# Patient Record
Sex: MALE | Race: WHITE | Employment: OTHER | URBAN - METROPOLITAN AREA
[De-identification: names, ages, dates, MRNs, and addresses within clinical notes are randomized per-mention and may not be internally consistent; named-entity substitution may affect disease eponyms.]

---

## 2018-04-08 ENCOUNTER — APPOINTMENT (OUTPATIENT)
Dept: CT IMAGING | Age: 73
DRG: 392 | End: 2018-04-08
Payer: MEDICARE

## 2018-04-08 ENCOUNTER — HOSPITAL ENCOUNTER (INPATIENT)
Age: 73
LOS: 3 days | Discharge: HOME HEALTH | DRG: 392 | End: 2018-04-11
Attending: EMERGENCY MEDICINE | Admitting: INTERNAL MEDICINE
Payer: MEDICARE

## 2018-04-08 DIAGNOSIS — K57.30 DIVERTICULOSIS OF LARGE INTESTINE WITHOUT HEMORRHAGE: ICD-10-CM

## 2018-04-08 DIAGNOSIS — K57.20 COLONIC DIVERTICULAR ABSCESS: Primary | ICD-10-CM

## 2018-04-08 DIAGNOSIS — Z86.73 HISTORY OF STROKE: ICD-10-CM

## 2018-04-08 DIAGNOSIS — Z85.46 HISTORY OF PROSTATE CANCER: ICD-10-CM

## 2018-04-08 DIAGNOSIS — K57.32 DIVERTICULITIS OF COLON: ICD-10-CM

## 2018-04-08 DIAGNOSIS — E78.5 HYPERLIPIDEMIA, UNSPECIFIED HYPERLIPIDEMIA TYPE: ICD-10-CM

## 2018-04-08 LAB
ALBUMIN SERPL-MCNC: 4.1 G/DL (ref 3.5–5.2)
ALP BLD-CCNC: 87 U/L (ref 40–129)
ALT SERPL-CCNC: 58 U/L (ref 0–40)
ANION GAP SERPL CALCULATED.3IONS-SCNC: 12 MMOL/L (ref 7–16)
AST SERPL-CCNC: 31 U/L (ref 0–39)
BASOPHILS ABSOLUTE: 0 E9/L (ref 0–0.2)
BASOPHILS RELATIVE PERCENT: 0 % (ref 0–2)
BILIRUB SERPL-MCNC: 0.8 MG/DL (ref 0–1.2)
BILIRUBIN URINE: NEGATIVE
BLOOD, URINE: NEGATIVE
BUN BLDV-MCNC: 14 MG/DL (ref 8–23)
CALCIUM SERPL-MCNC: 9.8 MG/DL (ref 8.6–10.2)
CHLORIDE BLD-SCNC: 96 MMOL/L (ref 98–107)
CLARITY: CLEAR
CO2: 28 MMOL/L (ref 22–29)
COLOR: YELLOW
CREAT SERPL-MCNC: 1 MG/DL (ref 0.7–1.2)
EKG ATRIAL RATE: 103 BPM
EKG P AXIS: 39 DEGREES
EKG P-R INTERVAL: 128 MS
EKG Q-T INTERVAL: 340 MS
EKG QRS DURATION: 76 MS
EKG QTC CALCULATION (BAZETT): 445 MS
EKG R AXIS: 39 DEGREES
EKG T AXIS: 47 DEGREES
EKG VENTRICULAR RATE: 103 BPM
EOSINOPHILS ABSOLUTE: 0.15 E9/L (ref 0.05–0.5)
EOSINOPHILS RELATIVE PERCENT: 1.7 % (ref 0–6)
GFR AFRICAN AMERICAN: >60
GFR NON-AFRICAN AMERICAN: >60 ML/MIN/1.73
GLUCOSE BLD-MCNC: 116 MG/DL (ref 74–109)
GLUCOSE URINE: NEGATIVE MG/DL
HCT VFR BLD CALC: 48.3 % (ref 37–54)
HEMOGLOBIN: 16.1 G/DL (ref 12.5–16.5)
KETONES, URINE: NEGATIVE MG/DL
LACTIC ACID: 2.6 MMOL/L (ref 0.5–2.2)
LEUKOCYTE ESTERASE, URINE: NEGATIVE
LIPASE: 18 U/L (ref 13–60)
LYMPHOCYTES ABSOLUTE: 0.54 E9/L (ref 1.5–4)
LYMPHOCYTES RELATIVE PERCENT: 6.1 % (ref 20–42)
MCH RBC QN AUTO: 32.7 PG (ref 26–35)
MCHC RBC AUTO-ENTMCNC: 33.3 % (ref 32–34.5)
MCV RBC AUTO: 98.2 FL (ref 80–99.9)
MONOCYTES ABSOLUTE: 0 E9/L (ref 0.1–0.95)
MONOCYTES RELATIVE PERCENT: 0 % (ref 2–12)
NEUTROPHILS ABSOLUTE: 8.28 E9/L (ref 1.8–7.3)
NEUTROPHILS RELATIVE PERCENT: 92.2 % (ref 43–80)
NITRITE, URINE: NEGATIVE
NUCLEATED RED BLOOD CELLS: 0 /100 WBC
PDW BLD-RTO: 12.4 FL (ref 11.5–15)
PH UA: 6 (ref 5–9)
PLATELET # BLD: 297 E9/L (ref 130–450)
PMV BLD AUTO: 9.1 FL (ref 7–12)
POTASSIUM SERPL-SCNC: 4.4 MMOL/L (ref 3.5–5)
PROTEIN UA: NEGATIVE MG/DL
RBC # BLD: 4.92 E12/L (ref 3.8–5.8)
SODIUM BLD-SCNC: 136 MMOL/L (ref 132–146)
SPECIFIC GRAVITY UA: 1.01 (ref 1–1.03)
TOTAL PROTEIN: 7.5 G/DL (ref 6.4–8.3)
UROBILINOGEN, URINE: 0.2 E.U./DL
WBC # BLD: 9 E9/L (ref 4.5–11.5)

## 2018-04-08 PROCEDURE — 83605 ASSAY OF LACTIC ACID: CPT

## 2018-04-08 PROCEDURE — 2580000003 HC RX 258: Performed by: INTERNAL MEDICINE

## 2018-04-08 PROCEDURE — 6360000004 HC RX CONTRAST MEDICATION: Performed by: RADIOLOGY

## 2018-04-08 PROCEDURE — 36415 COLL VENOUS BLD VENIPUNCTURE: CPT

## 2018-04-08 PROCEDURE — 81003 URINALYSIS AUTO W/O SCOPE: CPT

## 2018-04-08 PROCEDURE — 2580000003 HC RX 258: Performed by: STUDENT IN AN ORGANIZED HEALTH CARE EDUCATION/TRAINING PROGRAM

## 2018-04-08 PROCEDURE — 74176 CT ABD & PELVIS W/O CONTRAST: CPT

## 2018-04-08 PROCEDURE — 87040 BLOOD CULTURE FOR BACTERIA: CPT

## 2018-04-08 PROCEDURE — 85025 COMPLETE CBC W/AUTO DIFF WBC: CPT

## 2018-04-08 PROCEDURE — 83690 ASSAY OF LIPASE: CPT

## 2018-04-08 PROCEDURE — 1200000000 HC SEMI PRIVATE

## 2018-04-08 PROCEDURE — 6360000002 HC RX W HCPCS: Performed by: INTERNAL MEDICINE

## 2018-04-08 PROCEDURE — 6370000000 HC RX 637 (ALT 250 FOR IP): Performed by: STUDENT IN AN ORGANIZED HEALTH CARE EDUCATION/TRAINING PROGRAM

## 2018-04-08 PROCEDURE — 80053 COMPREHEN METABOLIC PANEL: CPT

## 2018-04-08 PROCEDURE — 87077 CULTURE AEROBIC IDENTIFY: CPT

## 2018-04-08 PROCEDURE — 99285 EMERGENCY DEPT VISIT HI MDM: CPT

## 2018-04-08 PROCEDURE — 74177 CT ABD & PELVIS W/CONTRAST: CPT

## 2018-04-08 PROCEDURE — 87186 SC STD MICRODIL/AGAR DIL: CPT

## 2018-04-08 PROCEDURE — 93005 ELECTROCARDIOGRAM TRACING: CPT | Performed by: STUDENT IN AN ORGANIZED HEALTH CARE EDUCATION/TRAINING PROGRAM

## 2018-04-08 RX ORDER — SIMVASTATIN 10 MG
20 TABLET ORAL NIGHTLY
COMMUNITY
End: 2019-06-10 | Stop reason: SDUPTHER

## 2018-04-08 RX ORDER — TRAMADOL HYDROCHLORIDE 50 MG/1
50 TABLET ORAL EVERY 6 HOURS PRN
Status: ON HOLD | COMMUNITY
End: 2018-04-08

## 2018-04-08 RX ORDER — CHLORAL HYDRATE 500 MG
1000 CAPSULE ORAL DAILY
COMMUNITY

## 2018-04-08 RX ORDER — METRONIDAZOLE 500 MG/1
500 TABLET ORAL ONCE
Status: COMPLETED | OUTPATIENT
Start: 2018-04-08 | End: 2018-04-08

## 2018-04-08 RX ORDER — MORPHINE SULFATE 4 MG/ML
4 INJECTION, SOLUTION INTRAMUSCULAR; INTRAVENOUS EVERY 4 HOURS PRN
Status: DISCONTINUED | OUTPATIENT
Start: 2018-04-08 | End: 2018-04-08 | Stop reason: SDUPTHER

## 2018-04-08 RX ORDER — ACETAMINOPHEN 325 MG/1
650 TABLET ORAL EVERY 4 HOURS PRN
Status: DISCONTINUED | OUTPATIENT
Start: 2018-04-08 | End: 2018-04-11 | Stop reason: HOSPADM

## 2018-04-08 RX ORDER — ONDANSETRON 2 MG/ML
4 INJECTION INTRAMUSCULAR; INTRAVENOUS EVERY 6 HOURS PRN
Status: DISCONTINUED | OUTPATIENT
Start: 2018-04-08 | End: 2018-04-11 | Stop reason: HOSPADM

## 2018-04-08 RX ORDER — MORPHINE SULFATE 2 MG/ML
2 INJECTION, SOLUTION INTRAMUSCULAR; INTRAVENOUS EVERY 4 HOURS PRN
Status: DISCONTINUED | OUTPATIENT
Start: 2018-04-08 | End: 2018-04-08 | Stop reason: SDUPTHER

## 2018-04-08 RX ORDER — MORPHINE SULFATE 2 MG/ML
4 INJECTION, SOLUTION INTRAMUSCULAR; INTRAVENOUS EVERY 4 HOURS PRN
Status: DISCONTINUED | OUTPATIENT
Start: 2018-04-08 | End: 2018-04-11

## 2018-04-08 RX ORDER — MORPHINE SULFATE 2 MG/ML
2 INJECTION, SOLUTION INTRAMUSCULAR; INTRAVENOUS EVERY 4 HOURS PRN
Status: DISCONTINUED | OUTPATIENT
Start: 2018-04-08 | End: 2018-04-11

## 2018-04-08 RX ORDER — CIPROFLOXACIN 2 MG/ML
400 INJECTION, SOLUTION INTRAVENOUS EVERY 12 HOURS
Status: DISCONTINUED | OUTPATIENT
Start: 2018-04-09 | End: 2018-04-09

## 2018-04-08 RX ORDER — VITAMIN B COMPLEX
1 CAPSULE ORAL DAILY
COMMUNITY

## 2018-04-08 RX ORDER — SODIUM CHLORIDE 9 MG/ML
INJECTION, SOLUTION INTRAVENOUS CONTINUOUS
Status: DISCONTINUED | OUTPATIENT
Start: 2018-04-08 | End: 2018-04-11

## 2018-04-08 RX ORDER — SODIUM CHLORIDE 0.9 % (FLUSH) 0.9 %
10 SYRINGE (ML) INJECTION PRN
Status: DISCONTINUED | OUTPATIENT
Start: 2018-04-08 | End: 2018-04-11 | Stop reason: HOSPADM

## 2018-04-08 RX ORDER — CIPROFLOXACIN 500 MG/1
500 TABLET, FILM COATED ORAL ONCE
Status: COMPLETED | OUTPATIENT
Start: 2018-04-08 | End: 2018-04-08

## 2018-04-08 RX ORDER — 0.9 % SODIUM CHLORIDE 0.9 %
1000 INTRAVENOUS SOLUTION INTRAVENOUS ONCE
Status: COMPLETED | OUTPATIENT
Start: 2018-04-08 | End: 2018-04-08

## 2018-04-08 RX ORDER — SODIUM CHLORIDE 0.9 % (FLUSH) 0.9 %
10 SYRINGE (ML) INJECTION EVERY 12 HOURS SCHEDULED
Status: DISCONTINUED | OUTPATIENT
Start: 2018-04-08 | End: 2018-04-11 | Stop reason: HOSPADM

## 2018-04-08 RX ORDER — ACYCLOVIR 200 MG/1
CAPSULE ORAL
Status: ON HOLD | COMMUNITY
End: 2018-04-08 | Stop reason: ALTCHOICE

## 2018-04-08 RX ORDER — CYCLOBENZAPRINE HCL 10 MG
10 TABLET ORAL 3 TIMES DAILY PRN
Status: ON HOLD | COMMUNITY
End: 2018-04-08

## 2018-04-08 RX ADMIN — Medication 10 ML: at 21:59

## 2018-04-08 RX ADMIN — METRONIDAZOLE 500 MG: 500 TABLET ORAL at 15:31

## 2018-04-08 RX ADMIN — CIPROFLOXACIN HYDROCHLORIDE 500 MG: 500 TABLET, FILM COATED ORAL at 15:32

## 2018-04-08 RX ADMIN — SODIUM CHLORIDE: 9 INJECTION, SOLUTION INTRAVENOUS at 21:58

## 2018-04-08 RX ADMIN — IOPAMIDOL 110 ML: 755 INJECTION, SOLUTION INTRAVENOUS at 17:22

## 2018-04-08 RX ADMIN — IOHEXOL 50 ML: 240 INJECTION, SOLUTION INTRATHECAL; INTRAVASCULAR; INTRAVENOUS; ORAL at 17:21

## 2018-04-08 RX ADMIN — SODIUM CHLORIDE 1000 ML: 9 INJECTION, SOLUTION INTRAVENOUS at 14:36

## 2018-04-08 RX ADMIN — ENOXAPARIN SODIUM 40 MG: 40 INJECTION, SOLUTION INTRAVENOUS; SUBCUTANEOUS at 22:04

## 2018-04-08 ASSESSMENT — ENCOUNTER SYMPTOMS
COUGH: 1
DIARRHEA: 1
PHOTOPHOBIA: 0
COLOR CHANGE: 0
ABDOMINAL PAIN: 1
TROUBLE SWALLOWING: 0
SINUS PRESSURE: 0
SINUS PAIN: 0
BLOOD IN STOOL: 0
EYE PAIN: 0
CHEST TIGHTNESS: 0
SHORTNESS OF BREATH: 0
ABDOMINAL DISTENTION: 1

## 2018-04-08 ASSESSMENT — PAIN SCALES - GENERAL
PAINLEVEL_OUTOF10: 4
PAINLEVEL_OUTOF10: 10

## 2018-04-08 ASSESSMENT — PAIN DESCRIPTION - ORIENTATION: ORIENTATION: LOWER;LEFT

## 2018-04-08 ASSESSMENT — PAIN DESCRIPTION - LOCATION
LOCATION: ABDOMEN
LOCATION: ABDOMEN

## 2018-04-08 ASSESSMENT — PAIN DESCRIPTION - ONSET: ONSET: ON-GOING

## 2018-04-08 ASSESSMENT — PAIN DESCRIPTION - FREQUENCY: FREQUENCY: INTERMITTENT

## 2018-04-08 ASSESSMENT — PAIN DESCRIPTION - PROGRESSION: CLINICAL_PROGRESSION: NOT CHANGED

## 2018-04-08 ASSESSMENT — PAIN DESCRIPTION - PAIN TYPE
TYPE: ACUTE PAIN
TYPE: ACUTE PAIN

## 2018-04-08 ASSESSMENT — PAIN DESCRIPTION - DESCRIPTORS: DESCRIPTORS: ACHING;DISCOMFORT

## 2018-04-09 ENCOUNTER — APPOINTMENT (OUTPATIENT)
Dept: CT IMAGING | Age: 73
DRG: 392 | End: 2018-04-09
Payer: MEDICARE

## 2018-04-09 LAB
ALBUMIN SERPL-MCNC: 3 G/DL (ref 3.5–5.2)
ALP BLD-CCNC: 86 U/L (ref 40–129)
ALT SERPL-CCNC: 92 U/L (ref 0–40)
ANION GAP SERPL CALCULATED.3IONS-SCNC: 9 MMOL/L (ref 7–16)
AST SERPL-CCNC: 48 U/L (ref 0–39)
BASOPHILS ABSOLUTE: 0.05 E9/L (ref 0–0.2)
BASOPHILS RELATIVE PERCENT: 0.3 % (ref 0–2)
BILIRUB SERPL-MCNC: 0.7 MG/DL (ref 0–1.2)
BUN BLDV-MCNC: 15 MG/DL (ref 8–23)
CALCIUM SERPL-MCNC: 8.9 MG/DL (ref 8.6–10.2)
CHLORIDE BLD-SCNC: 102 MMOL/L (ref 98–107)
CO2: 26 MMOL/L (ref 22–29)
CREAT SERPL-MCNC: 1 MG/DL (ref 0.7–1.2)
EOSINOPHILS ABSOLUTE: 0.1 E9/L (ref 0.05–0.5)
EOSINOPHILS RELATIVE PERCENT: 0.5 % (ref 0–6)
GFR AFRICAN AMERICAN: >60
GFR NON-AFRICAN AMERICAN: >60 ML/MIN/1.73
GLUCOSE BLD-MCNC: 108 MG/DL (ref 74–109)
HCT VFR BLD CALC: 40.3 % (ref 37–54)
HEMOGLOBIN: 13.4 G/DL (ref 12.5–16.5)
IMMATURE GRANULOCYTES #: 0.13 E9/L
IMMATURE GRANULOCYTES %: 0.7 % (ref 0–5)
INR BLD: 1.4
LACTIC ACID: 1.3 MMOL/L (ref 0.5–2.2)
LYMPHOCYTES ABSOLUTE: 1.08 E9/L (ref 1.5–4)
LYMPHOCYTES RELATIVE PERCENT: 5.5 % (ref 20–42)
MCH RBC QN AUTO: 32.7 PG (ref 26–35)
MCHC RBC AUTO-ENTMCNC: 33.3 % (ref 32–34.5)
MCV RBC AUTO: 98.3 FL (ref 80–99.9)
MONOCYTES ABSOLUTE: 0.95 E9/L (ref 0.1–0.95)
MONOCYTES RELATIVE PERCENT: 4.8 % (ref 2–12)
NEUTROPHILS ABSOLUTE: 17.28 E9/L (ref 1.8–7.3)
NEUTROPHILS RELATIVE PERCENT: 88.2 % (ref 43–80)
PDW BLD-RTO: 12.7 FL (ref 11.5–15)
PLATELET # BLD: 233 E9/L (ref 130–450)
PMV BLD AUTO: 9.2 FL (ref 7–12)
POTASSIUM REFLEX MAGNESIUM: 4.4 MMOL/L (ref 3.5–5)
PROTHROMBIN TIME: 16 SEC (ref 9.3–12.4)
RBC # BLD: 4.1 E12/L (ref 3.8–5.8)
SODIUM BLD-SCNC: 137 MMOL/L (ref 132–146)
TOTAL PROTEIN: 5.7 G/DL (ref 6.4–8.3)
WBC # BLD: 19.6 E9/L (ref 4.5–11.5)

## 2018-04-09 PROCEDURE — 0W9J3ZZ DRAINAGE OF PELVIC CAVITY, PERCUTANEOUS APPROACH: ICD-10-PCS | Performed by: RADIOLOGY

## 2018-04-09 PROCEDURE — 87075 CULTR BACTERIA EXCEPT BLOOD: CPT

## 2018-04-09 PROCEDURE — 6360000002 HC RX W HCPCS: Performed by: SPECIALIST

## 2018-04-09 PROCEDURE — 87077 CULTURE AEROBIC IDENTIFY: CPT

## 2018-04-09 PROCEDURE — 87205 SMEAR GRAM STAIN: CPT

## 2018-04-09 PROCEDURE — C1769 GUIDE WIRE: HCPCS

## 2018-04-09 PROCEDURE — 87186 SC STD MICRODIL/AGAR DIL: CPT

## 2018-04-09 PROCEDURE — 2500000003 HC RX 250 WO HCPCS: Performed by: SURGERY

## 2018-04-09 PROCEDURE — 2580000003 HC RX 258: Performed by: INTERNAL MEDICINE

## 2018-04-09 PROCEDURE — 6360000002 HC RX W HCPCS: Performed by: RADIOLOGY

## 2018-04-09 PROCEDURE — 1200000000 HC SEMI PRIVATE

## 2018-04-09 PROCEDURE — 87070 CULTURE OTHR SPECIMN AEROBIC: CPT

## 2018-04-09 PROCEDURE — 83605 ASSAY OF LACTIC ACID: CPT

## 2018-04-09 PROCEDURE — 80053 COMPREHEN METABOLIC PANEL: CPT

## 2018-04-09 PROCEDURE — 2580000003 HC RX 258: Performed by: SPECIALIST

## 2018-04-09 PROCEDURE — 6360000002 HC RX W HCPCS: Performed by: INTERNAL MEDICINE

## 2018-04-09 PROCEDURE — 85025 COMPLETE CBC W/AUTO DIFF WBC: CPT

## 2018-04-09 PROCEDURE — 85610 PROTHROMBIN TIME: CPT

## 2018-04-09 PROCEDURE — 36415 COLL VENOUS BLD VENIPUNCTURE: CPT

## 2018-04-09 PROCEDURE — 6370000000 HC RX 637 (ALT 250 FOR IP): Performed by: INTERNAL MEDICINE

## 2018-04-09 RX ORDER — SODIUM CHLORIDE 0.9 % (FLUSH) 0.9 %
10 SYRINGE (ML) INJECTION PRN
Status: DISCONTINUED | OUTPATIENT
Start: 2018-04-09 | End: 2018-04-11 | Stop reason: HOSPADM

## 2018-04-09 RX ORDER — DIPHENHYDRAMINE HYDROCHLORIDE 50 MG/ML
25 INJECTION INTRAMUSCULAR; INTRAVENOUS EVERY 6 HOURS PRN
Status: DISCONTINUED | OUTPATIENT
Start: 2018-04-09 | End: 2018-04-11 | Stop reason: HOSPADM

## 2018-04-09 RX ORDER — FENTANYL CITRATE 50 UG/ML
50 INJECTION, SOLUTION INTRAMUSCULAR; INTRAVENOUS ONCE
Status: COMPLETED | OUTPATIENT
Start: 2018-04-09 | End: 2018-04-09

## 2018-04-09 RX ORDER — MIDAZOLAM HYDROCHLORIDE 1 MG/ML
1 INJECTION INTRAMUSCULAR; INTRAVENOUS ONCE
Status: COMPLETED | OUTPATIENT
Start: 2018-04-09 | End: 2018-04-09

## 2018-04-09 RX ORDER — LIDOCAINE HYDROCHLORIDE 10 MG/ML
5 INJECTION, SOLUTION INFILTRATION; PERINEURAL ONCE
Status: COMPLETED | OUTPATIENT
Start: 2018-04-09 | End: 2018-04-10

## 2018-04-09 RX ORDER — CHLORPHENIRAMINE MALEATE 4 MG/1
4 TABLET ORAL NIGHTLY
Status: ON HOLD | COMMUNITY
End: 2018-04-11 | Stop reason: HOSPADM

## 2018-04-09 RX ORDER — DIPHENHYDRAMINE HCL 25 MG
25 TABLET ORAL DAILY
Status: ON HOLD | COMMUNITY
End: 2022-10-02

## 2018-04-09 RX ORDER — SODIUM CHLORIDE 0.9 % (FLUSH) 0.9 %
10 SYRINGE (ML) INJECTION EVERY 12 HOURS SCHEDULED
Status: DISCONTINUED | OUTPATIENT
Start: 2018-04-09 | End: 2018-04-11 | Stop reason: HOSPADM

## 2018-04-09 RX ADMIN — MIDAZOLAM HYDROCHLORIDE 1 MG: 1 INJECTION, SOLUTION INTRAMUSCULAR; INTRAVENOUS at 12:30

## 2018-04-09 RX ADMIN — CIPROFLOXACIN 400 MG: 2 INJECTION, SOLUTION INTRAVENOUS at 16:34

## 2018-04-09 RX ADMIN — ACETAMINOPHEN 650 MG: 325 TABLET ORAL at 15:27

## 2018-04-09 RX ADMIN — DIPHENHYDRAMINE HYDROCHLORIDE 25 MG: 50 INJECTION, SOLUTION INTRAMUSCULAR; INTRAVENOUS at 17:32

## 2018-04-09 RX ADMIN — CIPROFLOXACIN 400 MG: 2 INJECTION, SOLUTION INTRAVENOUS at 04:26

## 2018-04-09 RX ADMIN — METRONIDAZOLE 500 MG: 500 INJECTION, SOLUTION INTRAVENOUS at 00:05

## 2018-04-09 RX ADMIN — METRONIDAZOLE 500 MG: 500 INJECTION, SOLUTION INTRAVENOUS at 08:05

## 2018-04-09 RX ADMIN — FENTANYL CITRATE 50 MCG: 50 INJECTION INTRAMUSCULAR; INTRAVENOUS at 12:31

## 2018-04-09 RX ADMIN — METRONIDAZOLE 500 MG: 500 INJECTION, SOLUTION INTRAVENOUS at 15:14

## 2018-04-09 RX ADMIN — ACETAMINOPHEN 650 MG: 325 TABLET ORAL at 00:05

## 2018-04-09 RX ADMIN — MEROPENEM 1 G: 1 INJECTION, POWDER, FOR SOLUTION INTRAVENOUS at 20:38

## 2018-04-09 RX ADMIN — SODIUM CHLORIDE: 9 INJECTION, SOLUTION INTRAVENOUS at 13:28

## 2018-04-09 ASSESSMENT — PAIN SCALES - GENERAL
PAINLEVEL_OUTOF10: 3
PAINLEVEL_OUTOF10: 5
PAINLEVEL_OUTOF10: 0

## 2018-04-09 ASSESSMENT — PAIN DESCRIPTION - ONSET: ONSET: ON-GOING

## 2018-04-09 ASSESSMENT — PAIN DESCRIPTION - LOCATION: LOCATION: HEAD

## 2018-04-09 ASSESSMENT — PAIN DESCRIPTION - DESCRIPTORS: DESCRIPTORS: ACHING;HEADACHE

## 2018-04-09 ASSESSMENT — PAIN DESCRIPTION - FREQUENCY: FREQUENCY: INTERMITTENT

## 2018-04-09 ASSESSMENT — PAIN DESCRIPTION - ORIENTATION: ORIENTATION: MID

## 2018-04-09 ASSESSMENT — PAIN DESCRIPTION - PROGRESSION: CLINICAL_PROGRESSION: NOT CHANGED

## 2018-04-09 ASSESSMENT — PAIN DESCRIPTION - PAIN TYPE: TYPE: ACUTE PAIN

## 2018-04-10 PROBLEM — R78.81 BACTEREMIA: Status: ACTIVE | Noted: 2018-04-10

## 2018-04-10 PROBLEM — E44.0 MODERATE PROTEIN-CALORIE MALNUTRITION (HCC): Chronic | Status: ACTIVE | Noted: 2018-04-10

## 2018-04-10 LAB
ALBUMIN SERPL-MCNC: 3.4 G/DL (ref 3.5–5.2)
ALP BLD-CCNC: 104 U/L (ref 40–129)
ALT SERPL-CCNC: 69 U/L (ref 0–40)
ANION GAP SERPL CALCULATED.3IONS-SCNC: 12 MMOL/L (ref 7–16)
AST SERPL-CCNC: 28 U/L (ref 0–39)
BASOPHILS ABSOLUTE: 0.04 E9/L (ref 0–0.2)
BASOPHILS RELATIVE PERCENT: 0.3 % (ref 0–2)
BILIRUB SERPL-MCNC: 0.4 MG/DL (ref 0–1.2)
BUN BLDV-MCNC: 13 MG/DL (ref 8–23)
CALCIUM SERPL-MCNC: 8.8 MG/DL (ref 8.6–10.2)
CHLORIDE BLD-SCNC: 101 MMOL/L (ref 98–107)
CO2: 23 MMOL/L (ref 22–29)
CREAT SERPL-MCNC: 0.8 MG/DL (ref 0.7–1.2)
EOSINOPHILS ABSOLUTE: 0.09 E9/L (ref 0.05–0.5)
EOSINOPHILS RELATIVE PERCENT: 0.7 % (ref 0–6)
GFR AFRICAN AMERICAN: >60
GFR NON-AFRICAN AMERICAN: >60 ML/MIN/1.73
GLUCOSE BLD-MCNC: 109 MG/DL (ref 74–109)
GRAM STAIN ORDERABLE: NORMAL
HCT VFR BLD CALC: 41.6 % (ref 37–54)
HEMOGLOBIN: 14.2 G/DL (ref 12.5–16.5)
IMMATURE GRANULOCYTES #: 0.06 E9/L
IMMATURE GRANULOCYTES %: 0.5 % (ref 0–5)
LYMPHOCYTES ABSOLUTE: 0.73 E9/L (ref 1.5–4)
LYMPHOCYTES RELATIVE PERCENT: 6 % (ref 20–42)
MCH RBC QN AUTO: 33.3 PG (ref 26–35)
MCHC RBC AUTO-ENTMCNC: 34.1 % (ref 32–34.5)
MCV RBC AUTO: 97.4 FL (ref 80–99.9)
MONOCYTES ABSOLUTE: 0.83 E9/L (ref 0.1–0.95)
MONOCYTES RELATIVE PERCENT: 6.8 % (ref 2–12)
NEUTROPHILS ABSOLUTE: 10.39 E9/L (ref 1.8–7.3)
NEUTROPHILS RELATIVE PERCENT: 85.7 % (ref 43–80)
PDW BLD-RTO: 12.6 FL (ref 11.5–15)
PLATELET # BLD: 238 E9/L (ref 130–450)
PMV BLD AUTO: 9.7 FL (ref 7–12)
POTASSIUM REFLEX MAGNESIUM: 4.3 MMOL/L (ref 3.5–5)
RBC # BLD: 4.27 E12/L (ref 3.8–5.8)
SODIUM BLD-SCNC: 136 MMOL/L (ref 132–146)
TOTAL PROTEIN: 6.3 G/DL (ref 6.4–8.3)
WBC # BLD: 12.1 E9/L (ref 4.5–11.5)

## 2018-04-10 PROCEDURE — 1200000000 HC SEMI PRIVATE

## 2018-04-10 PROCEDURE — 36569 INSJ PICC 5 YR+ W/O IMAGING: CPT

## 2018-04-10 PROCEDURE — 85025 COMPLETE CBC W/AUTO DIFF WBC: CPT

## 2018-04-10 PROCEDURE — 2580000003 HC RX 258: Performed by: SPECIALIST

## 2018-04-10 PROCEDURE — 02HV33Z INSERTION OF INFUSION DEVICE INTO SUPERIOR VENA CAVA, PERCUTANEOUS APPROACH: ICD-10-PCS | Performed by: INTERNAL MEDICINE

## 2018-04-10 PROCEDURE — 36415 COLL VENOUS BLD VENIPUNCTURE: CPT

## 2018-04-10 PROCEDURE — 80053 COMPREHEN METABOLIC PANEL: CPT

## 2018-04-10 PROCEDURE — 2500000003 HC RX 250 WO HCPCS: Performed by: SPECIALIST

## 2018-04-10 PROCEDURE — 6360000002 HC RX W HCPCS: Performed by: SPECIALIST

## 2018-04-10 PROCEDURE — 76937 US GUIDE VASCULAR ACCESS: CPT

## 2018-04-10 PROCEDURE — 6370000000 HC RX 637 (ALT 250 FOR IP): Performed by: INTERNAL MEDICINE

## 2018-04-10 PROCEDURE — C1751 CATH, INF, PER/CENT/MIDLINE: HCPCS

## 2018-04-10 PROCEDURE — 6360000002 HC RX W HCPCS: Performed by: INTERNAL MEDICINE

## 2018-04-10 RX ADMIN — ENOXAPARIN SODIUM 40 MG: 40 INJECTION, SOLUTION INTRAVENOUS; SUBCUTANEOUS at 08:04

## 2018-04-10 RX ADMIN — MEROPENEM 1 G: 1 INJECTION, POWDER, FOR SOLUTION INTRAVENOUS at 18:44

## 2018-04-10 RX ADMIN — MEROPENEM 1 G: 1 INJECTION, POWDER, FOR SOLUTION INTRAVENOUS at 03:15

## 2018-04-10 RX ADMIN — Medication 10 ML: at 20:12

## 2018-04-10 RX ADMIN — MEROPENEM 1 G: 1 INJECTION, POWDER, FOR SOLUTION INTRAVENOUS at 10:31

## 2018-04-10 RX ADMIN — LIDOCAINE HYDROCHLORIDE 5 ML: 10 INJECTION, SOLUTION EPIDURAL; INFILTRATION; INTRACAUDAL; PERINEURAL at 12:00

## 2018-04-10 RX ADMIN — ACETAMINOPHEN 650 MG: 325 TABLET ORAL at 22:44

## 2018-04-10 ASSESSMENT — PAIN DESCRIPTION - DESCRIPTORS
DESCRIPTORS: ACHING;DISCOMFORT;SORE
DESCRIPTORS: ACHING;DISCOMFORT;SORE

## 2018-04-10 ASSESSMENT — PAIN DESCRIPTION - ORIENTATION
ORIENTATION: LEFT
ORIENTATION: LEFT

## 2018-04-10 ASSESSMENT — PAIN SCALES - GENERAL
PAINLEVEL_OUTOF10: 6
PAINLEVEL_OUTOF10: 5
PAINLEVEL_OUTOF10: 0
PAINLEVEL_OUTOF10: 0

## 2018-04-10 ASSESSMENT — PAIN DESCRIPTION - PAIN TYPE
TYPE: ACUTE PAIN
TYPE: ACUTE PAIN

## 2018-04-10 ASSESSMENT — PAIN DESCRIPTION - LOCATION
LOCATION: HIP;LEG
LOCATION: HIP;LEG

## 2018-04-11 VITALS
BODY MASS INDEX: 26.67 KG/M2 | WEIGHT: 156.19 LBS | RESPIRATION RATE: 14 BRPM | SYSTOLIC BLOOD PRESSURE: 161 MMHG | TEMPERATURE: 98.1 F | HEIGHT: 64 IN | DIASTOLIC BLOOD PRESSURE: 74 MMHG | HEART RATE: 77 BPM | OXYGEN SATURATION: 96 %

## 2018-04-11 LAB
ALBUMIN SERPL-MCNC: 2.9 G/DL (ref 3.5–5.2)
ALP BLD-CCNC: 72 U/L (ref 40–129)
ALT SERPL-CCNC: 49 U/L (ref 0–40)
ANAEROBIC CULTURE: NORMAL
ANION GAP SERPL CALCULATED.3IONS-SCNC: 9 MMOL/L (ref 7–16)
AST SERPL-CCNC: 28 U/L (ref 0–39)
BASOPHILS ABSOLUTE: 0.01 E9/L (ref 0–0.2)
BASOPHILS RELATIVE PERCENT: 0.1 % (ref 0–2)
BILIRUB SERPL-MCNC: 0.3 MG/DL (ref 0–1.2)
BODY FLUID CULTURE, STERILE: ABNORMAL
BODY FLUID CULTURE, STERILE: ABNORMAL
BUN BLDV-MCNC: 9 MG/DL (ref 8–23)
CALCIUM SERPL-MCNC: 8.1 MG/DL (ref 8.6–10.2)
CHLORIDE BLD-SCNC: 107 MMOL/L (ref 98–107)
CO2: 25 MMOL/L (ref 22–29)
CREAT SERPL-MCNC: 0.7 MG/DL (ref 0.7–1.2)
EOSINOPHILS ABSOLUTE: 0.17 E9/L (ref 0.05–0.5)
EOSINOPHILS RELATIVE PERCENT: 2.4 % (ref 0–6)
GFR AFRICAN AMERICAN: >60
GFR NON-AFRICAN AMERICAN: >60 ML/MIN/1.73
GLUCOSE BLD-MCNC: 95 MG/DL (ref 74–109)
GRAM STAIN RESULT: ABNORMAL
HCT VFR BLD CALC: 37.9 % (ref 37–54)
HEMOGLOBIN: 12.8 G/DL (ref 12.5–16.5)
IMMATURE GRANULOCYTES #: 0.03 E9/L
IMMATURE GRANULOCYTES %: 0.4 % (ref 0–5)
LYMPHOCYTES ABSOLUTE: 0.99 E9/L (ref 1.5–4)
LYMPHOCYTES RELATIVE PERCENT: 13.8 % (ref 20–42)
MCH RBC QN AUTO: 32.7 PG (ref 26–35)
MCHC RBC AUTO-ENTMCNC: 33.8 % (ref 32–34.5)
MCV RBC AUTO: 96.7 FL (ref 80–99.9)
MONOCYTES ABSOLUTE: 0.92 E9/L (ref 0.1–0.95)
MONOCYTES RELATIVE PERCENT: 12.8 % (ref 2–12)
NEUTROPHILS ABSOLUTE: 5.04 E9/L (ref 1.8–7.3)
NEUTROPHILS RELATIVE PERCENT: 70.5 % (ref 43–80)
ORGANISM: ABNORMAL
PDW BLD-RTO: 12.5 FL (ref 11.5–15)
PLATELET # BLD: 212 E9/L (ref 130–450)
PMV BLD AUTO: 9.4 FL (ref 7–12)
POTASSIUM REFLEX MAGNESIUM: 3.7 MMOL/L (ref 3.5–5)
RBC # BLD: 3.92 E12/L (ref 3.8–5.8)
SODIUM BLD-SCNC: 141 MMOL/L (ref 132–146)
TOTAL PROTEIN: 5.3 G/DL (ref 6.4–8.3)
WBC # BLD: 7.2 E9/L (ref 4.5–11.5)

## 2018-04-11 PROCEDURE — 80053 COMPREHEN METABOLIC PANEL: CPT

## 2018-04-11 PROCEDURE — 6360000002 HC RX W HCPCS: Performed by: SPECIALIST

## 2018-04-11 PROCEDURE — 2580000003 HC RX 258: Performed by: INTERNAL MEDICINE

## 2018-04-11 PROCEDURE — 36592 COLLECT BLOOD FROM PICC: CPT

## 2018-04-11 PROCEDURE — 6360000002 HC RX W HCPCS: Performed by: INTERNAL MEDICINE

## 2018-04-11 PROCEDURE — 2580000003 HC RX 258: Performed by: SPECIALIST

## 2018-04-11 PROCEDURE — 36415 COLL VENOUS BLD VENIPUNCTURE: CPT

## 2018-04-11 PROCEDURE — 6370000000 HC RX 637 (ALT 250 FOR IP): Performed by: INTERNAL MEDICINE

## 2018-04-11 PROCEDURE — 85025 COMPLETE CBC W/AUTO DIFF WBC: CPT

## 2018-04-11 RX ORDER — MEROPENEM 1 G/1
1 INJECTION, POWDER, FOR SOLUTION INTRAVENOUS EVERY 8 HOURS
Qty: 63 G | Refills: 0 | Status: SHIPPED | OUTPATIENT
Start: 2018-04-11 | End: 2018-05-02

## 2018-04-11 RX ORDER — HYDROCODONE BITARTRATE AND ACETAMINOPHEN 5; 325 MG/1; MG/1
1 TABLET ORAL EVERY 4 HOURS PRN
Status: DISCONTINUED | OUTPATIENT
Start: 2018-04-11 | End: 2018-04-11 | Stop reason: HOSPADM

## 2018-04-11 RX ORDER — HYDROCODONE BITARTRATE AND ACETAMINOPHEN 5; 325 MG/1; MG/1
1 TABLET ORAL EVERY 4 HOURS PRN
Qty: 18 TABLET | Refills: 0 | Status: SHIPPED | OUTPATIENT
Start: 2018-04-11 | End: 2018-04-14

## 2018-04-11 RX ORDER — HYDROCODONE BITARTRATE AND ACETAMINOPHEN 5; 325 MG/1; MG/1
2 TABLET ORAL EVERY 4 HOURS PRN
Status: DISCONTINUED | OUTPATIENT
Start: 2018-04-11 | End: 2018-04-11 | Stop reason: HOSPADM

## 2018-04-11 RX ADMIN — MEROPENEM 1 G: 1 INJECTION, POWDER, FOR SOLUTION INTRAVENOUS at 17:22

## 2018-04-11 RX ADMIN — SODIUM CHLORIDE: 9 INJECTION, SOLUTION INTRAVENOUS at 00:35

## 2018-04-11 RX ADMIN — MEROPENEM 1 G: 1 INJECTION, POWDER, FOR SOLUTION INTRAVENOUS at 02:48

## 2018-04-11 RX ADMIN — Medication 10 ML: at 08:17

## 2018-04-11 RX ADMIN — HYDROCODONE BITARTRATE AND ACETAMINOPHEN 2 TABLET: 5; 325 TABLET ORAL at 13:51

## 2018-04-11 RX ADMIN — MEROPENEM 1 G: 1 INJECTION, POWDER, FOR SOLUTION INTRAVENOUS at 10:32

## 2018-04-11 RX ADMIN — ENOXAPARIN SODIUM 40 MG: 40 INJECTION, SOLUTION INTRAVENOUS; SUBCUTANEOUS at 08:16

## 2018-04-11 ASSESSMENT — PAIN DESCRIPTION - DESCRIPTORS: DESCRIPTORS: ACHING;DISCOMFORT;SORE

## 2018-04-11 ASSESSMENT — PAIN DESCRIPTION - FREQUENCY: FREQUENCY: CONTINUOUS

## 2018-04-11 ASSESSMENT — PAIN DESCRIPTION - ORIENTATION: ORIENTATION: LEFT

## 2018-04-11 ASSESSMENT — PAIN DESCRIPTION - LOCATION: LOCATION: ABDOMEN

## 2018-04-11 ASSESSMENT — PAIN DESCRIPTION - PROGRESSION: CLINICAL_PROGRESSION: RAPIDLY IMPROVING

## 2018-04-11 ASSESSMENT — PAIN DESCRIPTION - ONSET: ONSET: ON-GOING

## 2018-04-11 ASSESSMENT — PAIN SCALES - GENERAL: PAINLEVEL_OUTOF10: 5

## 2018-04-11 ASSESSMENT — PAIN DESCRIPTION - PAIN TYPE: TYPE: ACUTE PAIN

## 2018-04-12 LAB
BLOOD CULTURE, ROUTINE: ABNORMAL
ORGANISM: ABNORMAL

## 2018-04-13 LAB — CULTURE, BLOOD 2: NORMAL

## 2018-04-19 ENCOUNTER — HOSPITAL ENCOUNTER (OUTPATIENT)
Age: 73
Discharge: HOME | End: 2018-04-21
Payer: MEDICARE

## 2018-04-19 LAB
ALBUMIN SERPL-MCNC: 3.6 G/DL (ref 3.5–5.2)
ALP BLD-CCNC: 62 U/L (ref 40–129)
ALT SERPL-CCNC: 54 U/L (ref 0–40)
ANION GAP SERPL CALCULATED.3IONS-SCNC: 11 MMOL/L (ref 7–16)
AST SERPL-CCNC: 29 U/L (ref 0–39)
BASOPHILS ABSOLUTE: 0.04 E9/L (ref 0–0.2)
BASOPHILS RELATIVE PERCENT: 0.5 % (ref 0–2)
BILIRUB SERPL-MCNC: 0.4 MG/DL (ref 0–1.2)
BUN BLDV-MCNC: 14 MG/DL (ref 8–23)
CALCIUM SERPL-MCNC: 9.2 MG/DL (ref 8.6–10.2)
CHLORIDE BLD-SCNC: 101 MMOL/L (ref 98–107)
CO2: 27 MMOL/L (ref 22–29)
CREAT SERPL-MCNC: 0.8 MG/DL (ref 0.7–1.2)
EOSINOPHILS ABSOLUTE: 0.19 E9/L (ref 0.05–0.5)
EOSINOPHILS RELATIVE PERCENT: 2.4 % (ref 0–6)
GFR AFRICAN AMERICAN: >60
GFR NON-AFRICAN AMERICAN: >60 ML/MIN/1.73
GLUCOSE BLD-MCNC: 154 MG/DL (ref 74–109)
HCT VFR BLD CALC: 43.3 % (ref 37–54)
HEMOGLOBIN: 14 G/DL (ref 12.5–16.5)
IMMATURE GRANULOCYTES #: 0.02 E9/L
IMMATURE GRANULOCYTES %: 0.2 % (ref 0–5)
LYMPHOCYTES ABSOLUTE: 1.35 E9/L (ref 1.5–4)
LYMPHOCYTES RELATIVE PERCENT: 16.7 % (ref 20–42)
MCH RBC QN AUTO: 31.7 PG (ref 26–35)
MCHC RBC AUTO-ENTMCNC: 32.3 % (ref 32–34.5)
MCV RBC AUTO: 98.2 FL (ref 80–99.9)
MONOCYTES ABSOLUTE: 0.61 E9/L (ref 0.1–0.95)
MONOCYTES RELATIVE PERCENT: 7.6 % (ref 2–12)
NEUTROPHILS ABSOLUTE: 5.86 E9/L (ref 1.8–7.3)
NEUTROPHILS RELATIVE PERCENT: 72.6 % (ref 43–80)
PDW BLD-RTO: 12.5 FL (ref 11.5–15)
PLATELET # BLD: 356 E9/L (ref 130–450)
PMV BLD AUTO: 9.6 FL (ref 7–12)
POTASSIUM SERPL-SCNC: 4.5 MMOL/L (ref 3.5–5)
RBC # BLD: 4.41 E12/L (ref 3.8–5.8)
SODIUM BLD-SCNC: 139 MMOL/L (ref 132–146)
TOTAL PROTEIN: 6.3 G/DL (ref 6.4–8.3)
WBC # BLD: 8.1 E9/L (ref 4.5–11.5)

## 2018-04-19 PROCEDURE — 85025 COMPLETE CBC W/AUTO DIFF WBC: CPT

## 2018-04-19 PROCEDURE — 80053 COMPREHEN METABOLIC PANEL: CPT

## 2018-04-19 PROCEDURE — 36415 COLL VENOUS BLD VENIPUNCTURE: CPT

## 2018-04-23 ENCOUNTER — HOSPITAL ENCOUNTER (OUTPATIENT)
Age: 73
Discharge: HOME | End: 2018-04-25
Payer: MEDICARE

## 2018-04-23 LAB
ALBUMIN SERPL-MCNC: 3.8 G/DL (ref 3.5–5.2)
ALP BLD-CCNC: 56 U/L (ref 40–129)
ALT SERPL-CCNC: 54 U/L (ref 0–40)
ANION GAP SERPL CALCULATED.3IONS-SCNC: 13 MMOL/L (ref 7–16)
AST SERPL-CCNC: 37 U/L (ref 0–39)
BASOPHILS ABSOLUTE: 0.05 E9/L (ref 0–0.2)
BASOPHILS RELATIVE PERCENT: 0.6 % (ref 0–2)
BILIRUB SERPL-MCNC: 0.4 MG/DL (ref 0–1.2)
BUN BLDV-MCNC: 14 MG/DL (ref 8–23)
C-REACTIVE PROTEIN: <0.1 MG/DL (ref 0–0.4)
CALCIUM SERPL-MCNC: 9.3 MG/DL (ref 8.6–10.2)
CHLORIDE BLD-SCNC: 101 MMOL/L (ref 98–107)
CO2: 28 MMOL/L (ref 22–29)
CREAT SERPL-MCNC: 0.7 MG/DL (ref 0.7–1.2)
EOSINOPHILS ABSOLUTE: 0.19 E9/L (ref 0.05–0.5)
EOSINOPHILS RELATIVE PERCENT: 2.3 % (ref 0–6)
GFR AFRICAN AMERICAN: >60
GFR NON-AFRICAN AMERICAN: >60 ML/MIN/1.73
GLUCOSE BLD-MCNC: 105 MG/DL (ref 74–109)
HCT VFR BLD CALC: 41.4 % (ref 37–54)
HEMOGLOBIN: 13.4 G/DL (ref 12.5–16.5)
IMMATURE GRANULOCYTES #: 0.02 E9/L
IMMATURE GRANULOCYTES %: 0.2 % (ref 0–5)
LYMPHOCYTES ABSOLUTE: 1.52 E9/L (ref 1.5–4)
LYMPHOCYTES RELATIVE PERCENT: 18.5 % (ref 20–42)
MCH RBC QN AUTO: 31.8 PG (ref 26–35)
MCHC RBC AUTO-ENTMCNC: 32.4 % (ref 32–34.5)
MCV RBC AUTO: 98.1 FL (ref 80–99.9)
MONOCYTES ABSOLUTE: 0.72 E9/L (ref 0.1–0.95)
MONOCYTES RELATIVE PERCENT: 8.8 % (ref 2–12)
NEUTROPHILS ABSOLUTE: 5.72 E9/L (ref 1.8–7.3)
NEUTROPHILS RELATIVE PERCENT: 69.6 % (ref 43–80)
PDW BLD-RTO: 12.1 FL (ref 11.5–15)
PLATELET # BLD: 322 E9/L (ref 130–450)
PMV BLD AUTO: 9.7 FL (ref 7–12)
POTASSIUM SERPL-SCNC: 4.1 MMOL/L (ref 3.5–5)
RBC # BLD: 4.22 E12/L (ref 3.8–5.8)
SEDIMENTATION RATE, ERYTHROCYTE: 24 MM/HR (ref 0–15)
SODIUM BLD-SCNC: 142 MMOL/L (ref 132–146)
TOTAL PROTEIN: 6.3 G/DL (ref 6.4–8.3)
WBC # BLD: 8.2 E9/L (ref 4.5–11.5)

## 2018-04-23 PROCEDURE — 80053 COMPREHEN METABOLIC PANEL: CPT

## 2018-04-23 PROCEDURE — 85651 RBC SED RATE NONAUTOMATED: CPT

## 2018-04-23 PROCEDURE — 85025 COMPLETE CBC W/AUTO DIFF WBC: CPT

## 2018-04-23 PROCEDURE — 86140 C-REACTIVE PROTEIN: CPT

## 2018-04-26 ENCOUNTER — HOSPITAL ENCOUNTER (OUTPATIENT)
Dept: GENERAL RADIOLOGY | Age: 73
Discharge: HOME | End: 2018-04-28
Payer: MEDICARE

## 2018-04-26 ENCOUNTER — HOSPITAL ENCOUNTER (OUTPATIENT)
Dept: CT IMAGING | Age: 73
Discharge: HOME | End: 2018-04-28
Payer: MEDICARE

## 2018-04-26 DIAGNOSIS — L02.91 ABSCESS: ICD-10-CM

## 2018-04-26 DIAGNOSIS — K57.20 COLONIC DIVERTICULAR ABSCESS: ICD-10-CM

## 2018-04-26 PROCEDURE — 76080 X-RAY EXAM OF FISTULA: CPT

## 2018-11-26 ENCOUNTER — PREP FOR PROCEDURE (OUTPATIENT)
Dept: PODIATRY | Age: 73
End: 2018-11-26

## 2018-11-26 DIAGNOSIS — S90.512D ABRASION, LEFT ANKLE, SUBSEQUENT ENCOUNTER: ICD-10-CM

## 2018-11-26 DIAGNOSIS — M20.22 HALLUX RIGIDUS OF LEFT FOOT: Primary | ICD-10-CM

## 2018-11-26 RX ORDER — SODIUM CHLORIDE 0.9 % (FLUSH) 0.9 %
10 SYRINGE (ML) INJECTION EVERY 12 HOURS SCHEDULED
Status: CANCELLED | OUTPATIENT
Start: 2018-11-26

## 2018-11-26 RX ORDER — SODIUM CHLORIDE 0.9 % (FLUSH) 0.9 %
10 SYRINGE (ML) INJECTION PRN
Status: CANCELLED | OUTPATIENT
Start: 2018-11-26

## 2019-01-11 ENCOUNTER — HOSPITAL ENCOUNTER (OUTPATIENT)
Age: 74
Setting detail: OUTPATIENT SURGERY
Discharge: HOME | End: 2019-01-11
Attending: PODIATRIST | Admitting: PODIATRIST
Payer: MEDICARE

## 2019-01-11 ENCOUNTER — HOSPITAL ENCOUNTER (OUTPATIENT)
Dept: GENERAL RADIOLOGY | Age: 74
Discharge: HOME | End: 2019-01-13
Attending: PODIATRIST
Payer: MEDICARE

## 2019-01-11 ENCOUNTER — ANESTHESIA (OUTPATIENT)
Dept: OPERATING ROOM | Age: 74
End: 2019-01-11
Payer: MEDICARE

## 2019-01-11 ENCOUNTER — ANESTHESIA EVENT (OUTPATIENT)
Dept: OPERATING ROOM | Age: 74
End: 2019-01-11
Payer: MEDICARE

## 2019-01-11 VITALS
SYSTOLIC BLOOD PRESSURE: 152 MMHG | OXYGEN SATURATION: 100 % | TEMPERATURE: 96.3 F | DIASTOLIC BLOOD PRESSURE: 84 MMHG | RESPIRATION RATE: 4 BRPM

## 2019-01-11 VITALS
RESPIRATION RATE: 18 BRPM | SYSTOLIC BLOOD PRESSURE: 148 MMHG | OXYGEN SATURATION: 95 % | BODY MASS INDEX: 26.98 KG/M2 | HEART RATE: 70 BPM | DIASTOLIC BLOOD PRESSURE: 79 MMHG | WEIGHT: 158 LBS | TEMPERATURE: 97.5 F | HEIGHT: 64 IN

## 2019-01-11 DIAGNOSIS — Z98.1 ARTHRODESIS PRESENT: ICD-10-CM

## 2019-01-11 DIAGNOSIS — G89.18 POST-OP PAIN: Primary | ICD-10-CM

## 2019-01-11 DIAGNOSIS — M20.22 HALLUX RIGIDUS OF LEFT FOOT: ICD-10-CM

## 2019-01-11 PROCEDURE — 7100000010 HC PHASE II RECOVERY - FIRST 15 MIN: Performed by: PODIATRIST

## 2019-01-11 PROCEDURE — 88304 TISSUE EXAM BY PATHOLOGIST: CPT

## 2019-01-11 PROCEDURE — C1713 ANCHOR/SCREW BN/BN,TIS/BN: HCPCS | Performed by: PODIATRIST

## 2019-01-11 PROCEDURE — 2500000003 HC RX 250 WO HCPCS: Performed by: PODIATRIST

## 2019-01-11 PROCEDURE — 6360000002 HC RX W HCPCS: Performed by: NURSE ANESTHETIST, CERTIFIED REGISTERED

## 2019-01-11 PROCEDURE — 7100000001 HC PACU RECOVERY - ADDTL 15 MIN: Performed by: PODIATRIST

## 2019-01-11 PROCEDURE — 3700000001 HC ADD 15 MINUTES (ANESTHESIA): Performed by: PODIATRIST

## 2019-01-11 PROCEDURE — 88311 DECALCIFY TISSUE: CPT

## 2019-01-11 PROCEDURE — 2580000003 HC RX 258: Performed by: NURSE ANESTHETIST, CERTIFIED REGISTERED

## 2019-01-11 PROCEDURE — 3600000012 HC SURGERY LEVEL 2 ADDTL 15MIN: Performed by: PODIATRIST

## 2019-01-11 PROCEDURE — 3209999900 FLUORO FOR SURGICAL PROCEDURES

## 2019-01-11 PROCEDURE — 7100000011 HC PHASE II RECOVERY - ADDTL 15 MIN: Performed by: PODIATRIST

## 2019-01-11 PROCEDURE — 2709999900 HC NON-CHARGEABLE SUPPLY: Performed by: PODIATRIST

## 2019-01-11 PROCEDURE — 6360000002 HC RX W HCPCS: Performed by: PODIATRIST

## 2019-01-11 PROCEDURE — 7100000000 HC PACU RECOVERY - FIRST 15 MIN: Performed by: PODIATRIST

## 2019-01-11 PROCEDURE — 3600000002 HC SURGERY LEVEL 2 BASE: Performed by: PODIATRIST

## 2019-01-11 PROCEDURE — 3700000000 HC ANESTHESIA ATTENDED CARE: Performed by: PODIATRIST

## 2019-01-11 PROCEDURE — 2500000003 HC RX 250 WO HCPCS: Performed by: NURSE ANESTHETIST, CERTIFIED REGISTERED

## 2019-01-11 DEVICE — IMPLANTABLE DEVICE: Type: IMPLANTABLE DEVICE | Site: FOOT | Status: FUNCTIONAL

## 2019-01-11 DEVICE — SCREW BNE L16MM DIA3MM CORT FT ANK TI SELF DRL SLD FULL: Type: IMPLANTABLE DEVICE | Site: FOOT | Status: FUNCTIONAL

## 2019-01-11 DEVICE — SCREW BNE L16MM DIA3MM FT ANK TI VAR ANG LOK LO PROF FOR: Type: IMPLANTABLE DEVICE | Site: FOOT | Status: FUNCTIONAL

## 2019-01-11 DEVICE — SCREW BNE L14MM DIA3MM FT ANK TI VAR ANG LCK LO PROF: Type: IMPLANTABLE DEVICE | Site: FOOT | Status: FUNCTIONAL

## 2019-01-11 RX ORDER — GLYCOPYRROLATE 1 MG/5 ML
SYRINGE (ML) INTRAVENOUS PRN
Status: DISCONTINUED | OUTPATIENT
Start: 2019-01-11 | End: 2019-01-11 | Stop reason: SDUPTHER

## 2019-01-11 RX ORDER — SODIUM CHLORIDE 9 MG/ML
INJECTION, SOLUTION INTRAVENOUS CONTINUOUS PRN
Status: DISCONTINUED | OUTPATIENT
Start: 2019-01-11 | End: 2019-01-11

## 2019-01-11 RX ORDER — FENTANYL CITRATE 50 UG/ML
50 INJECTION, SOLUTION INTRAMUSCULAR; INTRAVENOUS EVERY 5 MIN PRN
Status: DISCONTINUED | OUTPATIENT
Start: 2019-01-11 | End: 2019-01-11 | Stop reason: HOSPADM

## 2019-01-11 RX ORDER — SODIUM CHLORIDE 0.9 % (FLUSH) 0.9 %
10 SYRINGE (ML) INJECTION PRN
Status: DISCONTINUED | OUTPATIENT
Start: 2019-01-11 | End: 2019-01-11 | Stop reason: HOSPADM

## 2019-01-11 RX ORDER — OXYCODONE AND ACETAMINOPHEN 7.5; 325 MG/1; MG/1
1 TABLET ORAL EVERY 6 HOURS PRN
Qty: 40 TABLET | Refills: 0 | Status: SHIPPED | OUTPATIENT
Start: 2019-01-11 | End: 2019-01-21

## 2019-01-11 RX ORDER — PROMETHAZINE HYDROCHLORIDE 25 MG/ML
6.25 INJECTION, SOLUTION INTRAMUSCULAR; INTRAVENOUS
Status: DISCONTINUED | OUTPATIENT
Start: 2019-01-11 | End: 2019-01-11 | Stop reason: HOSPADM

## 2019-01-11 RX ORDER — DIPHENHYDRAMINE HYDROCHLORIDE 50 MG/ML
12.5 INJECTION INTRAMUSCULAR; INTRAVENOUS
Status: DISCONTINUED | OUTPATIENT
Start: 2019-01-11 | End: 2019-01-11 | Stop reason: HOSPADM

## 2019-01-11 RX ORDER — CEFAZOLIN SODIUM 2 G/50ML
2 SOLUTION INTRAVENOUS
Status: COMPLETED | OUTPATIENT
Start: 2019-01-11 | End: 2019-01-11

## 2019-01-11 RX ORDER — SODIUM CHLORIDE 0.9 % (FLUSH) 0.9 %
10 SYRINGE (ML) INJECTION EVERY 12 HOURS SCHEDULED
Status: DISCONTINUED | OUTPATIENT
Start: 2019-01-11 | End: 2019-01-11 | Stop reason: HOSPADM

## 2019-01-11 RX ORDER — MIDAZOLAM HYDROCHLORIDE 1 MG/ML
INJECTION INTRAMUSCULAR; INTRAVENOUS PRN
Status: DISCONTINUED | OUTPATIENT
Start: 2019-01-11 | End: 2019-01-11 | Stop reason: SDUPTHER

## 2019-01-11 RX ORDER — DEXAMETHASONE SODIUM PHOSPHATE 4 MG/ML
INJECTION, SOLUTION INTRA-ARTICULAR; INTRALESIONAL; INTRAMUSCULAR; INTRAVENOUS; SOFT TISSUE PRN
Status: DISCONTINUED | OUTPATIENT
Start: 2019-01-11 | End: 2019-01-11 | Stop reason: SDUPTHER

## 2019-01-11 RX ORDER — SODIUM CHLORIDE, SODIUM LACTATE, POTASSIUM CHLORIDE, CALCIUM CHLORIDE 600; 310; 30; 20 MG/100ML; MG/100ML; MG/100ML; MG/100ML
INJECTION, SOLUTION INTRAVENOUS CONTINUOUS PRN
Status: DISCONTINUED | OUTPATIENT
Start: 2019-01-11 | End: 2019-01-11 | Stop reason: SDUPTHER

## 2019-01-11 RX ORDER — OXYCODONE HYDROCHLORIDE AND ACETAMINOPHEN 5; 325 MG/1; MG/1
1 TABLET ORAL
Status: DISCONTINUED | OUTPATIENT
Start: 2019-01-11 | End: 2019-01-11 | Stop reason: HOSPADM

## 2019-01-11 RX ORDER — MEPERIDINE HYDROCHLORIDE 25 MG/ML
12.5 INJECTION INTRAMUSCULAR; INTRAVENOUS; SUBCUTANEOUS EVERY 5 MIN PRN
Status: DISCONTINUED | OUTPATIENT
Start: 2019-01-11 | End: 2019-01-11 | Stop reason: HOSPADM

## 2019-01-11 RX ORDER — FENTANYL CITRATE 50 UG/ML
INJECTION, SOLUTION INTRAMUSCULAR; INTRAVENOUS PRN
Status: DISCONTINUED | OUTPATIENT
Start: 2019-01-11 | End: 2019-01-11 | Stop reason: SDUPTHER

## 2019-01-11 RX ORDER — PROPOFOL 10 MG/ML
INJECTION, EMULSION INTRAVENOUS PRN
Status: DISCONTINUED | OUTPATIENT
Start: 2019-01-11 | End: 2019-01-11 | Stop reason: SDUPTHER

## 2019-01-11 RX ORDER — ONDANSETRON 2 MG/ML
INJECTION INTRAMUSCULAR; INTRAVENOUS PRN
Status: DISCONTINUED | OUTPATIENT
Start: 2019-01-11 | End: 2019-01-11 | Stop reason: SDUPTHER

## 2019-01-11 RX ORDER — FENTANYL CITRATE 50 UG/ML
25 INJECTION, SOLUTION INTRAMUSCULAR; INTRAVENOUS EVERY 5 MIN PRN
Status: DISCONTINUED | OUTPATIENT
Start: 2019-01-11 | End: 2019-01-11 | Stop reason: HOSPADM

## 2019-01-11 RX ORDER — BUPIVACAINE HYDROCHLORIDE 5 MG/ML
INJECTION, SOLUTION EPIDURAL; INTRACAUDAL PRN
Status: DISCONTINUED | OUTPATIENT
Start: 2019-01-11 | End: 2019-01-11 | Stop reason: HOSPADM

## 2019-01-11 RX ADMIN — FENTANYL CITRATE 20 MCG: 50 INJECTION, SOLUTION INTRAMUSCULAR; INTRAVENOUS at 12:33

## 2019-01-11 RX ADMIN — SODIUM CHLORIDE, POTASSIUM CHLORIDE, SODIUM LACTATE AND CALCIUM CHLORIDE: 600; 310; 30; 20 INJECTION, SOLUTION INTRAVENOUS at 12:32

## 2019-01-11 RX ADMIN — PHENYLEPHRINE HYDROCHLORIDE 100 MCG: 10 INJECTION INTRAVENOUS at 11:55

## 2019-01-11 RX ADMIN — PHENYLEPHRINE HYDROCHLORIDE 100 MCG: 10 INJECTION INTRAVENOUS at 12:21

## 2019-01-11 RX ADMIN — PHENYLEPHRINE HYDROCHLORIDE 100 MCG: 10 INJECTION INTRAVENOUS at 11:38

## 2019-01-11 RX ADMIN — LIDOCAINE HYDROCHLORIDE 100 MG: 20 INJECTION, SOLUTION INTRAVENOUS at 11:31

## 2019-01-11 RX ADMIN — PROPOFOL 185 MG: 10 INJECTION, EMULSION INTRAVENOUS at 11:31

## 2019-01-11 RX ADMIN — PHENYLEPHRINE HYDROCHLORIDE 100 MCG: 10 INJECTION INTRAVENOUS at 12:15

## 2019-01-11 RX ADMIN — DEXAMETHASONE SODIUM PHOSPHATE 10 MG: 4 INJECTION, SOLUTION INTRAMUSCULAR; INTRAVENOUS at 11:35

## 2019-01-11 RX ADMIN — ONDANSETRON 4 MG: 2 INJECTION INTRAMUSCULAR; INTRAVENOUS at 11:35

## 2019-01-11 RX ADMIN — MIDAZOLAM 2 MG: 1 INJECTION INTRAMUSCULAR; INTRAVENOUS at 11:24

## 2019-01-11 RX ADMIN — SODIUM CHLORIDE, POTASSIUM CHLORIDE, SODIUM LACTATE AND CALCIUM CHLORIDE: 600; 310; 30; 20 INJECTION, SOLUTION INTRAVENOUS at 11:03

## 2019-01-11 RX ADMIN — CEFAZOLIN SODIUM 2 G: 2 SOLUTION INTRAVENOUS at 11:22

## 2019-01-11 RX ADMIN — FENTANYL CITRATE 50 MCG: 50 INJECTION, SOLUTION INTRAMUSCULAR; INTRAVENOUS at 11:30

## 2019-01-11 RX ADMIN — PHENYLEPHRINE HYDROCHLORIDE 100 MCG: 10 INJECTION INTRAVENOUS at 11:50

## 2019-01-11 RX ADMIN — Medication 0.2 MG: at 12:15

## 2019-01-11 RX ADMIN — FENTANYL CITRATE 50 MCG: 50 INJECTION, SOLUTION INTRAMUSCULAR; INTRAVENOUS at 11:32

## 2019-01-11 ASSESSMENT — PULMONARY FUNCTION TESTS
PIF_VALUE: 20
PIF_VALUE: 17
PIF_VALUE: 0
PIF_VALUE: 18
PIF_VALUE: 0
PIF_VALUE: 14
PIF_VALUE: 2
PIF_VALUE: 14
PIF_VALUE: 17
PIF_VALUE: 17
PIF_VALUE: 12
PIF_VALUE: 0
PIF_VALUE: 14
PIF_VALUE: 17
PIF_VALUE: 20
PIF_VALUE: 17
PIF_VALUE: 20
PIF_VALUE: 20
PIF_VALUE: 17
PIF_VALUE: 16
PIF_VALUE: 0
PIF_VALUE: 17
PIF_VALUE: 5
PIF_VALUE: 15
PIF_VALUE: 17
PIF_VALUE: 17
PIF_VALUE: 5
PIF_VALUE: 14
PIF_VALUE: 17
PIF_VALUE: 17
PIF_VALUE: 16
PIF_VALUE: 16
PIF_VALUE: 17
PIF_VALUE: 18
PIF_VALUE: 16
PIF_VALUE: 16
PIF_VALUE: 0
PIF_VALUE: 12
PIF_VALUE: 0
PIF_VALUE: 1
PIF_VALUE: 16
PIF_VALUE: 20
PIF_VALUE: 20
PIF_VALUE: 17
PIF_VALUE: 20
PIF_VALUE: 17
PIF_VALUE: 14
PIF_VALUE: 17
PIF_VALUE: 12
PIF_VALUE: 17
PIF_VALUE: 20
PIF_VALUE: 17
PIF_VALUE: 21
PIF_VALUE: 19
PIF_VALUE: 16
PIF_VALUE: 14
PIF_VALUE: 17
PIF_VALUE: 20
PIF_VALUE: 0
PIF_VALUE: 9
PIF_VALUE: 26
PIF_VALUE: 17
PIF_VALUE: 26
PIF_VALUE: 17
PIF_VALUE: 17
PIF_VALUE: 20
PIF_VALUE: 14
PIF_VALUE: 14
PIF_VALUE: 16
PIF_VALUE: 17
PIF_VALUE: 17
PIF_VALUE: 1
PIF_VALUE: 16
PIF_VALUE: 17
PIF_VALUE: 0
PIF_VALUE: 14
PIF_VALUE: 18
PIF_VALUE: 17
PIF_VALUE: 17
PIF_VALUE: 1
PIF_VALUE: 1
PIF_VALUE: 16
PIF_VALUE: 17
PIF_VALUE: 16
PIF_VALUE: 20
PIF_VALUE: 17
PIF_VALUE: 16
PIF_VALUE: 4
PIF_VALUE: 14
PIF_VALUE: 20
PIF_VALUE: 17
PIF_VALUE: 14
PIF_VALUE: 17
PIF_VALUE: 20
PIF_VALUE: 17
PIF_VALUE: 17
PIF_VALUE: 14
PIF_VALUE: 0
PIF_VALUE: 1
PIF_VALUE: 14
PIF_VALUE: 17
PIF_VALUE: 18
PIF_VALUE: 1
PIF_VALUE: 2
PIF_VALUE: 17
PIF_VALUE: 17

## 2019-01-11 ASSESSMENT — PAIN SCALES - GENERAL
PAINLEVEL_OUTOF10: 0

## 2019-01-11 ASSESSMENT — PAIN - FUNCTIONAL ASSESSMENT: PAIN_FUNCTIONAL_ASSESSMENT: 0-10

## 2019-06-10 ENCOUNTER — HOSPITAL ENCOUNTER (OUTPATIENT)
Age: 74
Discharge: HOME | End: 2019-06-12
Payer: MEDICARE

## 2019-06-10 ENCOUNTER — OFFICE VISIT (OUTPATIENT)
Dept: FAMILY MEDICINE CLINIC | Age: 74
End: 2019-06-10
Payer: MEDICARE

## 2019-06-10 VITALS
SYSTOLIC BLOOD PRESSURE: 122 MMHG | HEIGHT: 64 IN | DIASTOLIC BLOOD PRESSURE: 64 MMHG | HEART RATE: 70 BPM | TEMPERATURE: 97.9 F | BODY MASS INDEX: 27.66 KG/M2 | WEIGHT: 162 LBS | OXYGEN SATURATION: 97 %

## 2019-06-10 DIAGNOSIS — I35.0 NONRHEUMATIC AORTIC VALVE STENOSIS: ICD-10-CM

## 2019-06-10 DIAGNOSIS — R25.2 MUSCLE CRAMPS: ICD-10-CM

## 2019-06-10 DIAGNOSIS — Z12.11 ENCOUNTER FOR SCREENING COLONOSCOPY: ICD-10-CM

## 2019-06-10 DIAGNOSIS — R25.2 MUSCLE CRAMPS: Primary | ICD-10-CM

## 2019-06-10 DIAGNOSIS — E78.5 HYPERLIPIDEMIA, UNSPECIFIED HYPERLIPIDEMIA TYPE: ICD-10-CM

## 2019-06-10 DIAGNOSIS — Z85.46 HISTORY OF PROSTATE CANCER: ICD-10-CM

## 2019-06-10 LAB
ALBUMIN SERPL-MCNC: 4.9 G/DL (ref 3.5–5.2)
ALP BLD-CCNC: 62 U/L (ref 40–129)
ALT SERPL-CCNC: 30 U/L (ref 0–40)
ANION GAP SERPL CALCULATED.3IONS-SCNC: 13 MMOL/L (ref 7–16)
AST SERPL-CCNC: 27 U/L (ref 0–39)
BASOPHILS ABSOLUTE: 0.04 E9/L (ref 0–0.2)
BASOPHILS RELATIVE PERCENT: 0.6 % (ref 0–2)
BILIRUB SERPL-MCNC: 0.8 MG/DL (ref 0–1.2)
BILIRUBIN DIRECT: <0.2 MG/DL (ref 0–0.3)
BILIRUBIN, INDIRECT: NORMAL MG/DL (ref 0–1)
BUN BLDV-MCNC: 18 MG/DL (ref 8–23)
CALCIUM SERPL-MCNC: 9.9 MG/DL (ref 8.6–10.2)
CHLORIDE BLD-SCNC: 101 MMOL/L (ref 98–107)
CHOLESTEROL, TOTAL: 171 MG/DL (ref 0–199)
CO2: 26 MMOL/L (ref 22–29)
CREAT SERPL-MCNC: 1.1 MG/DL (ref 0.7–1.2)
EOSINOPHILS ABSOLUTE: 0.19 E9/L (ref 0.05–0.5)
EOSINOPHILS RELATIVE PERCENT: 2.7 % (ref 0–6)
FOLATE: >20 NG/ML (ref 4.8–24.2)
GFR AFRICAN AMERICAN: >60
GFR NON-AFRICAN AMERICAN: >60 ML/MIN/1.73
GLUCOSE BLD-MCNC: 94 MG/DL (ref 74–99)
HCT VFR BLD CALC: 50.4 % (ref 37–54)
HDLC SERPL-MCNC: 48 MG/DL
HEMOGLOBIN: 16.2 G/DL (ref 12.5–16.5)
IMMATURE GRANULOCYTES #: 0.03 E9/L
IMMATURE GRANULOCYTES %: 0.4 % (ref 0–5)
LDL CHOLESTEROL CALCULATED: 99 MG/DL (ref 0–99)
LYMPHOCYTES ABSOLUTE: 1.37 E9/L (ref 1.5–4)
LYMPHOCYTES RELATIVE PERCENT: 19.4 % (ref 20–42)
MAGNESIUM: 2.5 MG/DL (ref 1.6–2.6)
MCH RBC QN AUTO: 32 PG (ref 26–35)
MCHC RBC AUTO-ENTMCNC: 32.1 % (ref 32–34.5)
MCV RBC AUTO: 99.4 FL (ref 80–99.9)
MONOCYTES ABSOLUTE: 0.7 E9/L (ref 0.1–0.95)
MONOCYTES RELATIVE PERCENT: 9.9 % (ref 2–12)
NEUTROPHILS ABSOLUTE: 4.74 E9/L (ref 1.8–7.3)
NEUTROPHILS RELATIVE PERCENT: 67 % (ref 43–80)
PDW BLD-RTO: 12.4 FL (ref 11.5–15)
PHOSPHORUS: 2.8 MG/DL (ref 2.5–4.5)
PLATELET # BLD: 253 E9/L (ref 130–450)
PMV BLD AUTO: 9.8 FL (ref 7–12)
POTASSIUM SERPL-SCNC: 4.8 MMOL/L (ref 3.5–5)
RBC # BLD: 5.07 E12/L (ref 3.8–5.8)
SODIUM BLD-SCNC: 140 MMOL/L (ref 132–146)
TOTAL PROTEIN: 7.8 G/DL (ref 6.4–8.3)
TRIGL SERPL-MCNC: 121 MG/DL (ref 0–149)
TSH SERPL DL<=0.05 MIU/L-ACNC: 1.41 UIU/ML (ref 0.27–4.2)
VITAMIN B-12: 673 PG/ML (ref 211–946)
VITAMIN D 25-HYDROXY: 27 NG/ML (ref 30–100)
VLDLC SERPL CALC-MCNC: 24 MG/DL
WBC # BLD: 7.1 E9/L (ref 4.5–11.5)

## 2019-06-10 PROCEDURE — 82306 VITAMIN D 25 HYDROXY: CPT

## 2019-06-10 PROCEDURE — 82746 ASSAY OF FOLIC ACID SERUM: CPT

## 2019-06-10 PROCEDURE — 84443 ASSAY THYROID STIM HORMONE: CPT

## 2019-06-10 PROCEDURE — 83735 ASSAY OF MAGNESIUM: CPT

## 2019-06-10 PROCEDURE — 99214 OFFICE O/P EST MOD 30 MIN: CPT | Performed by: INTERNAL MEDICINE

## 2019-06-10 PROCEDURE — 82607 VITAMIN B-12: CPT

## 2019-06-10 PROCEDURE — 82248 BILIRUBIN DIRECT: CPT

## 2019-06-10 PROCEDURE — 82247 BILIRUBIN TOTAL: CPT

## 2019-06-10 PROCEDURE — 80069 RENAL FUNCTION PANEL: CPT

## 2019-06-10 PROCEDURE — 84075 ASSAY ALKALINE PHOSPHATASE: CPT

## 2019-06-10 PROCEDURE — 85025 COMPLETE CBC W/AUTO DIFF WBC: CPT

## 2019-06-10 PROCEDURE — 84155 ASSAY OF PROTEIN SERUM: CPT

## 2019-06-10 PROCEDURE — 84460 ALANINE AMINO (ALT) (SGPT): CPT

## 2019-06-10 PROCEDURE — 84450 TRANSFERASE (AST) (SGOT): CPT

## 2019-06-10 PROCEDURE — 80061 LIPID PANEL: CPT

## 2019-06-10 PROCEDURE — 36415 COLL VENOUS BLD VENIPUNCTURE: CPT

## 2019-06-10 RX ORDER — SIMVASTATIN 10 MG
20 TABLET ORAL NIGHTLY
Qty: 90 TABLET | Refills: 1 | Status: SHIPPED | OUTPATIENT
Start: 2019-06-10 | End: 2019-10-15 | Stop reason: SDUPTHER

## 2019-06-10 RX ORDER — CLINDAMYCIN HYDROCHLORIDE 300 MG/1
CAPSULE ORAL
Qty: 6 CAPSULE | Refills: 0 | Status: SHIPPED
Start: 2019-06-10 | End: 2020-03-05

## 2019-06-10 RX ORDER — TETRACYCLINE HCL 500 MG
CAPSULE ORAL DAILY
COMMUNITY

## 2019-06-10 RX ORDER — MAGNESIUM OXIDE 400 MG/1
400 TABLET ORAL DAILY
COMMUNITY

## 2019-06-10 RX ORDER — CLINDAMYCIN HYDROCHLORIDE 300 MG/1
CAPSULE ORAL
Qty: 6 CAPSULE | Refills: 0 | Status: SHIPPED | OUTPATIENT
Start: 2019-06-10 | End: 2019-06-10 | Stop reason: SDUPTHER

## 2019-06-10 ASSESSMENT — PATIENT HEALTH QUESTIONNAIRE - PHQ9
1. LITTLE INTEREST OR PLEASURE IN DOING THINGS: 0
2. FEELING DOWN, DEPRESSED OR HOPELESS: 0
SUM OF ALL RESPONSES TO PHQ QUESTIONS 1-9: 0
SUM OF ALL RESPONSES TO PHQ QUESTIONS 1-9: 0
SUM OF ALL RESPONSES TO PHQ9 QUESTIONS 1 & 2: 0

## 2019-06-11 ENCOUNTER — TELEPHONE (OUTPATIENT)
Dept: FAMILY MEDICINE CLINIC | Age: 74
End: 2019-06-11

## 2019-06-11 NOTE — TELEPHONE ENCOUNTER
----- Message from Maddison Brooks DO sent at 6/10/2019  8:25 PM EDT -----  Vitamin D level was low at 27. The rest of his laboratory including his iron magnesium and electrolytes were all normal.  There is no laboratory reason here for the patient's muscle cramps.

## 2019-10-03 ENCOUNTER — HOSPITAL ENCOUNTER (OUTPATIENT)
Age: 74
Discharge: HOME | End: 2019-10-05
Payer: MEDICARE

## 2019-10-03 LAB — PROSTATE SPECIFIC ANTIGEN: 0.02 NG/ML (ref 0–4)

## 2019-10-03 PROCEDURE — 84153 ASSAY OF PSA TOTAL: CPT

## 2019-10-07 ENCOUNTER — OFFICE VISIT (OUTPATIENT)
Dept: PODIATRY | Age: 74
End: 2019-10-07
Payer: MEDICARE

## 2019-10-07 VITALS
HEIGHT: 63 IN | BODY MASS INDEX: 28.7 KG/M2 | SYSTOLIC BLOOD PRESSURE: 132 MMHG | WEIGHT: 162 LBS | TEMPERATURE: 97.8 F | DIASTOLIC BLOOD PRESSURE: 78 MMHG

## 2019-10-07 DIAGNOSIS — R25.2 MUSCLE CRAMPS AT NIGHT: Primary | ICD-10-CM

## 2019-10-07 PROCEDURE — 99213 OFFICE O/P EST LOW 20 MIN: CPT | Performed by: PODIATRIST

## 2019-10-07 RX ORDER — ROPINIROLE 0.25 MG/1
0.25 TABLET, FILM COATED ORAL NIGHTLY
Qty: 60 TABLET | Refills: 2 | Status: SHIPPED | OUTPATIENT
Start: 2019-10-07 | End: 2019-12-10 | Stop reason: SDUPTHER

## 2019-10-15 RX ORDER — SIMVASTATIN 10 MG
20 TABLET ORAL NIGHTLY
Qty: 90 TABLET | Refills: 1 | Status: SHIPPED | OUTPATIENT
Start: 2019-10-15 | End: 2019-12-10 | Stop reason: SDUPTHER

## 2019-12-10 ENCOUNTER — OFFICE VISIT (OUTPATIENT)
Dept: FAMILY MEDICINE CLINIC | Age: 74
End: 2019-12-10
Payer: MEDICARE

## 2019-12-10 ENCOUNTER — HOSPITAL ENCOUNTER (OUTPATIENT)
Age: 74
Discharge: HOME | End: 2019-12-12
Payer: MEDICARE

## 2019-12-10 VITALS
SYSTOLIC BLOOD PRESSURE: 140 MMHG | DIASTOLIC BLOOD PRESSURE: 64 MMHG | WEIGHT: 168 LBS | BODY MASS INDEX: 28.68 KG/M2 | HEIGHT: 64 IN | TEMPERATURE: 97.6 F | OXYGEN SATURATION: 97 % | HEART RATE: 61 BPM

## 2019-12-10 DIAGNOSIS — M79.605 PAIN OF LEFT LOWER EXTREMITY: ICD-10-CM

## 2019-12-10 DIAGNOSIS — I35.0 NONRHEUMATIC AORTIC VALVE STENOSIS: ICD-10-CM

## 2019-12-10 DIAGNOSIS — E78.5 HYPERLIPIDEMIA, UNSPECIFIED HYPERLIPIDEMIA TYPE: ICD-10-CM

## 2019-12-10 DIAGNOSIS — I10 ESSENTIAL HYPERTENSION, BENIGN: ICD-10-CM

## 2019-12-10 DIAGNOSIS — I10 ESSENTIAL HYPERTENSION, BENIGN: Primary | ICD-10-CM

## 2019-12-10 DIAGNOSIS — M48.02 CERVICAL STENOSIS OF SPINE: ICD-10-CM

## 2019-12-10 LAB
ALBUMIN SERPL-MCNC: 4.8 G/DL (ref 3.5–5.2)
ALP BLD-CCNC: 59 U/L (ref 40–129)
ALT SERPL-CCNC: 24 U/L (ref 0–40)
ANION GAP SERPL CALCULATED.3IONS-SCNC: 12 MMOL/L (ref 7–16)
AST SERPL-CCNC: 21 U/L (ref 0–39)
BASOPHILS ABSOLUTE: 0.05 E9/L (ref 0–0.2)
BASOPHILS RELATIVE PERCENT: 0.7 % (ref 0–2)
BILIRUB SERPL-MCNC: 0.6 MG/DL (ref 0–1.2)
BILIRUBIN DIRECT: <0.2 MG/DL (ref 0–0.3)
BILIRUBIN, INDIRECT: NORMAL MG/DL (ref 0–1)
BUN BLDV-MCNC: 16 MG/DL (ref 8–23)
CALCIUM SERPL-MCNC: 9.8 MG/DL (ref 8.6–10.2)
CHLORIDE BLD-SCNC: 103 MMOL/L (ref 98–107)
CHOLESTEROL, TOTAL: 164 MG/DL (ref 0–199)
CO2: 26 MMOL/L (ref 22–29)
CREAT SERPL-MCNC: 1 MG/DL (ref 0.7–1.2)
EOSINOPHILS ABSOLUTE: 0.28 E9/L (ref 0.05–0.5)
EOSINOPHILS RELATIVE PERCENT: 3.8 % (ref 0–6)
GFR AFRICAN AMERICAN: >60
GFR NON-AFRICAN AMERICAN: >60 ML/MIN/1.73
GLUCOSE BLD-MCNC: 98 MG/DL (ref 74–99)
HCT VFR BLD CALC: 48.2 % (ref 37–54)
HDLC SERPL-MCNC: 44 MG/DL
HEMOGLOBIN: 15.3 G/DL (ref 12.5–16.5)
IMMATURE GRANULOCYTES #: 0.01 E9/L
IMMATURE GRANULOCYTES %: 0.1 % (ref 0–5)
LDL CHOLESTEROL CALCULATED: 82 MG/DL (ref 0–99)
LYMPHOCYTES ABSOLUTE: 1.87 E9/L (ref 1.5–4)
LYMPHOCYTES RELATIVE PERCENT: 25.2 % (ref 20–42)
MAGNESIUM: 2.4 MG/DL (ref 1.6–2.6)
MCH RBC QN AUTO: 31.5 PG (ref 26–35)
MCHC RBC AUTO-ENTMCNC: 31.7 % (ref 32–34.5)
MCV RBC AUTO: 99.4 FL (ref 80–99.9)
MONOCYTES ABSOLUTE: 0.73 E9/L (ref 0.1–0.95)
MONOCYTES RELATIVE PERCENT: 9.8 % (ref 2–12)
NEUTROPHILS ABSOLUTE: 4.48 E9/L (ref 1.8–7.3)
NEUTROPHILS RELATIVE PERCENT: 60.4 % (ref 43–80)
PDW BLD-RTO: 12.2 FL (ref 11.5–15)
PHOSPHORUS: 3 MG/DL (ref 2.5–4.5)
PLATELET # BLD: 253 E9/L (ref 130–450)
PMV BLD AUTO: 10.2 FL (ref 7–12)
POTASSIUM SERPL-SCNC: 4.2 MMOL/L (ref 3.5–5)
RBC # BLD: 4.85 E12/L (ref 3.8–5.8)
SODIUM BLD-SCNC: 141 MMOL/L (ref 132–146)
TOTAL PROTEIN: 7.3 G/DL (ref 6.4–8.3)
TRIGL SERPL-MCNC: 189 MG/DL (ref 0–149)
VLDLC SERPL CALC-MCNC: 38 MG/DL
WBC # BLD: 7.4 E9/L (ref 4.5–11.5)

## 2019-12-10 PROCEDURE — 80061 LIPID PANEL: CPT

## 2019-12-10 PROCEDURE — 82247 BILIRUBIN TOTAL: CPT

## 2019-12-10 PROCEDURE — 84460 ALANINE AMINO (ALT) (SGPT): CPT

## 2019-12-10 PROCEDURE — 36415 COLL VENOUS BLD VENIPUNCTURE: CPT

## 2019-12-10 PROCEDURE — 84075 ASSAY ALKALINE PHOSPHATASE: CPT

## 2019-12-10 PROCEDURE — 84450 TRANSFERASE (AST) (SGOT): CPT

## 2019-12-10 PROCEDURE — 99214 OFFICE O/P EST MOD 30 MIN: CPT | Performed by: INTERNAL MEDICINE

## 2019-12-10 PROCEDURE — 82248 BILIRUBIN DIRECT: CPT

## 2019-12-10 PROCEDURE — 83735 ASSAY OF MAGNESIUM: CPT

## 2019-12-10 PROCEDURE — 84155 ASSAY OF PROTEIN SERUM: CPT

## 2019-12-10 PROCEDURE — 85025 COMPLETE CBC W/AUTO DIFF WBC: CPT

## 2019-12-10 PROCEDURE — 80069 RENAL FUNCTION PANEL: CPT

## 2019-12-10 RX ORDER — SIMVASTATIN 10 MG
20 TABLET ORAL NIGHTLY
Qty: 90 TABLET | Refills: 1 | Status: SHIPPED
Start: 2019-12-10 | End: 2020-03-05 | Stop reason: SDUPTHER

## 2019-12-10 RX ORDER — ROPINIROLE 0.25 MG/1
0.25 TABLET, FILM COATED ORAL NIGHTLY
Qty: 90 TABLET | Refills: 1 | Status: SHIPPED
Start: 2019-12-10 | End: 2020-03-05 | Stop reason: SDUPTHER

## 2019-12-10 SDOH — ECONOMIC STABILITY: TRANSPORTATION INSECURITY
IN THE PAST 12 MONTHS, HAS THE LACK OF TRANSPORTATION KEPT YOU FROM MEDICAL APPOINTMENTS OR FROM GETTING MEDICATIONS?: PATIENT DECLINED

## 2019-12-10 SDOH — ECONOMIC STABILITY: TRANSPORTATION INSECURITY
IN THE PAST 12 MONTHS, HAS LACK OF TRANSPORTATION KEPT YOU FROM MEETINGS, WORK, OR FROM GETTING THINGS NEEDED FOR DAILY LIVING?: PATIENT DECLINED

## 2019-12-10 SDOH — ECONOMIC STABILITY: INCOME INSECURITY: HOW HARD IS IT FOR YOU TO PAY FOR THE VERY BASICS LIKE FOOD, HOUSING, MEDICAL CARE, AND HEATING?: PATIENT DECLINED

## 2019-12-10 SDOH — ECONOMIC STABILITY: FOOD INSECURITY: WITHIN THE PAST 12 MONTHS, THE FOOD YOU BOUGHT JUST DIDN'T LAST AND YOU DIDN'T HAVE MONEY TO GET MORE.: PATIENT DECLINED

## 2019-12-10 SDOH — ECONOMIC STABILITY: FOOD INSECURITY: WITHIN THE PAST 12 MONTHS, YOU WORRIED THAT YOUR FOOD WOULD RUN OUT BEFORE YOU GOT MONEY TO BUY MORE.: PATIENT DECLINED

## 2020-03-04 ENCOUNTER — TELEPHONE (OUTPATIENT)
Dept: ADMINISTRATIVE | Age: 75
End: 2020-03-04

## 2020-03-05 ENCOUNTER — OFFICE VISIT (OUTPATIENT)
Dept: FAMILY MEDICINE CLINIC | Age: 75
End: 2020-03-05
Payer: MEDICARE

## 2020-03-05 VITALS
SYSTOLIC BLOOD PRESSURE: 138 MMHG | OXYGEN SATURATION: 94 % | WEIGHT: 168 LBS | HEART RATE: 80 BPM | TEMPERATURE: 99 F | BODY MASS INDEX: 28.84 KG/M2 | DIASTOLIC BLOOD PRESSURE: 80 MMHG

## 2020-03-05 PROCEDURE — 99214 OFFICE O/P EST MOD 30 MIN: CPT | Performed by: INTERNAL MEDICINE

## 2020-03-05 RX ORDER — METHYLPREDNISOLONE 4 MG/1
TABLET ORAL
Qty: 1 KIT | Refills: 0 | Status: SHIPPED | OUTPATIENT
Start: 2020-03-05 | End: 2020-03-11

## 2020-03-05 RX ORDER — ROPINIROLE 0.25 MG/1
0.25 TABLET, FILM COATED ORAL NIGHTLY
Qty: 90 TABLET | Refills: 1 | Status: SHIPPED
Start: 2020-03-05 | End: 2020-06-05 | Stop reason: SDUPTHER

## 2020-03-05 RX ORDER — SIMVASTATIN 10 MG
20 TABLET ORAL NIGHTLY
Qty: 90 TABLET | Refills: 1 | Status: SHIPPED
Start: 2020-03-05 | End: 2020-06-05 | Stop reason: SDUPTHER

## 2020-03-05 NOTE — PROGRESS NOTES
Pt is here with cough , congestion and low fever . He also needs medication refills. 3/5/2020     Abrahan Sebastian (:  1945) is a 70 y.o. male, here for evaluation of a cough and sinus pressure for over a month. Sometimes he feels ill but most the time is just the drainage. Denies any shortness of breath. No fevers or chills. He had questions about benefits from his insurance company. Chief Complaint   Patient presents with    Cough    Head Congestion    Chills    Hypertension    Medication Refill         Review of Systems    Prior to Visit Medications    Medication Sig Taking? Authorizing Provider   simvastatin (ZOCOR) 10 MG tablet Take 2 tablets by mouth nightly Yes Aayush Rodas DO   rOPINIRole (REQUIP) 0.25 MG tablet Take 1 tablet by mouth nightly Yes Aayush Roads DO   methylPREDNISolone (MEDROL DOSEPACK) 4 MG tablet Take by mouth. Yes Aayush Rodas DO   Phenylephrine-DM-GG (ROBITUSSIN MULTI-SYMPTOM MAX) 5- MG/5ML LIQD Take 5 mLs by mouth 4 times daily Yes Aayush Rodas DO   magnesium oxide (MAG-OX) 400 MG tablet Take 400 mg by mouth daily Yes Historical Provider, MD   Apple Cider Vinegar 500 MG TABS Take by mouth Yes Historical Provider, MD   Potassium 99 MG TABS Take by mouth Yes Historical Provider, MD   diphenhydrAMINE (BENADRYL) 25 MG tablet Take 25 mg by mouth daily Yes Historical Provider, MD   aspirin 81 MG tablet Take 81 mg by mouth daily Ld 2019 per dr. Lamont Vaughn Yes Historical Provider, MD   Omega-3 Fatty Acids (FISH OIL) 1000 MG CAPS Take 1,000 mg by mouth daily Ld 2019 Yes Historical Provider, MD   b complex vitamins capsule Take 1 capsule by mouth daily Ld 2019 Yes Historical Provider, MD      Allergies   Allergen Reactions    Ciprofloxacin Anaphylaxis    Penicillins Rash    Bactrim [Sulfamethoxazole-Trimethoprim] Other (See Comments)     ?        Past Medical History:   Diagnosis Date    Arthritis     Benign prostatic hyperplasia     Diverticulosis     Erectile dysfunction     Impotence of organic origin    GERD (gastroesophageal reflux disease)     Heart murmur     History of prostate cancer     prostate- disorder of prostate    History of stroke 2003    l eye    Hypercholesterolemia     Hyperlipidemia     MVP (mitral valve prolapse)      Past Surgical History:   Procedure Laterality Date    ARTHRODESIS Left 1/11/2019    LEFT FOOT ARTHRODESIS 1ST METATARSOPHALANGEAL JOINT performed by Mesha Heath DPM at [address]  2011    l lump neg    COLONOSCOPY  2012    4/18/2016,9/19/2007,2/14/2011   [address] [place] [address]    r inguinal hydrocele also    KNEE ARTHROSCOPY Left     LYMPH NODE BIOPSY  1965    neg    PROSTATECTOMY  [id number]    LAP ROBOTIC    UPPER GASTROINTESTINAL ENDOSCOPY  2016      Social History     Tobacco Use    Smoking status: Never Smoker    Smokeless tobacco: Never Used   Substance Use Topics    Alcohol use: Yes     Comment: 2 x month        Vitals:    03/05/20 0726   BP: 138/80   Pulse: 80   Temp: 99 °F (37.2 °C)   SpO2: 94%   Weight: 168 lb (76.2 kg)     Estimated body mass index is 28.84 kg/m² as calculated from the following:    Height as of 12/10/19: 5' 4\" (1.626 m). Weight as of this encounter: 168 lb (76.2 kg). Physical Exam  Constitutional:       Appearance: Normal appearance. HENT:      Head: Normocephalic. Right Ear: Tympanic membrane, ear canal and external ear normal.      Left Ear: Tympanic membrane, ear canal and external ear normal.      Nose:      Comments: Sinuses show pallor but no severe edema. Clear anterior drainage. No facial edema on pressure     Mouth/Throat:      Mouth: Mucous membranes are moist.   Eyes:      Extraocular Movements: Extraocular movements intact. Conjunctiva/sclera: Conjunctivae normal.      Pupils: Pupils are equal, round, and reactive to light.    Cardiovascular:      Rate and Rhythm: Normal rate

## 2020-06-05 ENCOUNTER — HOSPITAL ENCOUNTER (OUTPATIENT)
Age: 75
Discharge: HOME | End: 2020-06-07
Payer: MEDICARE

## 2020-06-05 ENCOUNTER — OFFICE VISIT (OUTPATIENT)
Dept: FAMILY MEDICINE CLINIC | Age: 75
End: 2020-06-05
Payer: MEDICARE

## 2020-06-05 VITALS
BODY MASS INDEX: 28.51 KG/M2 | OXYGEN SATURATION: 99 % | DIASTOLIC BLOOD PRESSURE: 82 MMHG | SYSTOLIC BLOOD PRESSURE: 132 MMHG | HEART RATE: 63 BPM | WEIGHT: 167 LBS | TEMPERATURE: 96.8 F | HEIGHT: 64 IN

## 2020-06-05 LAB
ALBUMIN SERPL-MCNC: 4.7 G/DL (ref 3.5–5.2)
ALP BLD-CCNC: 58 U/L (ref 40–129)
ALT SERPL-CCNC: 21 U/L (ref 0–40)
ANION GAP SERPL CALCULATED.3IONS-SCNC: 16 MMOL/L (ref 7–16)
AST SERPL-CCNC: 21 U/L (ref 0–39)
BASOPHILS ABSOLUTE: 0.04 E9/L (ref 0–0.2)
BASOPHILS RELATIVE PERCENT: 0.5 % (ref 0–2)
BILIRUB SERPL-MCNC: 0.6 MG/DL (ref 0–1.2)
BILIRUBIN DIRECT: <0.2 MG/DL (ref 0–0.3)
BILIRUBIN, INDIRECT: NORMAL MG/DL (ref 0–1)
BUN BLDV-MCNC: 14 MG/DL (ref 8–23)
CALCIUM SERPL-MCNC: 10 MG/DL (ref 8.6–10.2)
CHLORIDE BLD-SCNC: 104 MMOL/L (ref 98–107)
CHOLESTEROL, TOTAL: 158 MG/DL (ref 0–199)
CO2: 23 MMOL/L (ref 22–29)
CREAT SERPL-MCNC: 1 MG/DL (ref 0.7–1.2)
EOSINOPHILS ABSOLUTE: 0.35 E9/L (ref 0.05–0.5)
EOSINOPHILS RELATIVE PERCENT: 4.8 % (ref 0–6)
GFR AFRICAN AMERICAN: >60
GFR NON-AFRICAN AMERICAN: >60 ML/MIN/1.73
GLUCOSE BLD-MCNC: 106 MG/DL (ref 74–99)
HCT VFR BLD CALC: 47.7 % (ref 37–54)
HDLC SERPL-MCNC: 45 MG/DL
HEMOGLOBIN: 15.5 G/DL (ref 12.5–16.5)
IMMATURE GRANULOCYTES #: 0.01 E9/L
IMMATURE GRANULOCYTES %: 0.1 % (ref 0–5)
LDL CHOLESTEROL CALCULATED: 86 MG/DL (ref 0–99)
LYMPHOCYTES ABSOLUTE: 1.53 E9/L (ref 1.5–4)
LYMPHOCYTES RELATIVE PERCENT: 20.9 % (ref 20–42)
MCH RBC QN AUTO: 32.2 PG (ref 26–35)
MCHC RBC AUTO-ENTMCNC: 32.5 % (ref 32–34.5)
MCV RBC AUTO: 99.2 FL (ref 80–99.9)
MONOCYTES ABSOLUTE: 0.77 E9/L (ref 0.1–0.95)
MONOCYTES RELATIVE PERCENT: 10.5 % (ref 2–12)
NEUTROPHILS ABSOLUTE: 4.63 E9/L (ref 1.8–7.3)
NEUTROPHILS RELATIVE PERCENT: 63.2 % (ref 43–80)
PDW BLD-RTO: 12.3 FL (ref 11.5–15)
PHOSPHORUS: 2.6 MG/DL (ref 2.5–4.5)
PLATELET # BLD: 245 E9/L (ref 130–450)
PMV BLD AUTO: 10.5 FL (ref 7–12)
POTASSIUM SERPL-SCNC: 4.8 MMOL/L (ref 3.5–5)
RBC # BLD: 4.81 E12/L (ref 3.8–5.8)
SODIUM BLD-SCNC: 143 MMOL/L (ref 132–146)
TOTAL PROTEIN: 7.4 G/DL (ref 6.4–8.3)
TRIGL SERPL-MCNC: 134 MG/DL (ref 0–149)
VITAMIN D 25-HYDROXY: 30 NG/ML (ref 30–100)
VLDLC SERPL CALC-MCNC: 27 MG/DL
WBC # BLD: 7.3 E9/L (ref 4.5–11.5)

## 2020-06-05 PROCEDURE — 84460 ALANINE AMINO (ALT) (SGPT): CPT

## 2020-06-05 PROCEDURE — 80069 RENAL FUNCTION PANEL: CPT

## 2020-06-05 PROCEDURE — 99214 OFFICE O/P EST MOD 30 MIN: CPT | Performed by: INTERNAL MEDICINE

## 2020-06-05 PROCEDURE — 82248 BILIRUBIN DIRECT: CPT

## 2020-06-05 PROCEDURE — 82247 BILIRUBIN TOTAL: CPT

## 2020-06-05 PROCEDURE — 84450 TRANSFERASE (AST) (SGOT): CPT

## 2020-06-05 PROCEDURE — 36415 COLL VENOUS BLD VENIPUNCTURE: CPT

## 2020-06-05 PROCEDURE — 84075 ASSAY ALKALINE PHOSPHATASE: CPT

## 2020-06-05 PROCEDURE — 82306 VITAMIN D 25 HYDROXY: CPT

## 2020-06-05 PROCEDURE — 84155 ASSAY OF PROTEIN SERUM: CPT

## 2020-06-05 PROCEDURE — 80061 LIPID PANEL: CPT

## 2020-06-05 PROCEDURE — 85025 COMPLETE CBC W/AUTO DIFF WBC: CPT

## 2020-06-05 RX ORDER — ROPINIROLE 0.5 MG/1
0.5 TABLET, FILM COATED ORAL NIGHTLY
Qty: 90 TABLET | Refills: 1 | Status: SHIPPED
Start: 2020-06-05 | End: 2020-12-08 | Stop reason: ALTCHOICE

## 2020-06-05 RX ORDER — SIMVASTATIN 10 MG
20 TABLET ORAL NIGHTLY
Qty: 90 TABLET | Refills: 1 | Status: SHIPPED
Start: 2020-06-05 | End: 2020-10-20

## 2020-06-05 NOTE — PROGRESS NOTES
2020     Cici Bee (:  1945) is a 70 y.o. male, here for 3-month follow-up for his hypertension and hyper lipidemia. His last laboratory performed was 6 months ago. Last PSA 2019. Still having a lot of discomfort in the left shoulder. Has been trying to do some exercise with it but is denies any injury. No nausea vomiting or diarrhea. Chief Complaint   Patient presents with    Hypertension     6 mo f/u    Shoulder Pain     Lt/ about 2 mo         Review of Systems    Prior to Visit Medications    Medication Sig Taking? Authorizing Provider   simvastatin (ZOCOR) 10 MG tablet Take 2 tablets by mouth nightly Yes Ally Faster, DO   rOPINIRole (REQUIP) 0.5 MG tablet Take 1 tablet by mouth nightly Yes Ally Faster, DO   diclofenac sodium (VOLTAREN) 1 % GEL Apply 2 g topically 4 times daily Yes Ally Faster, DO   magnesium oxide (MAG-OX) 400 MG tablet Take 400 mg by mouth daily Yes Historical Provider, MD   Apple Cider Vinegar 500 MG TABS Take by mouth Yes Historical Provider, MD   Potassium 99 MG TABS Take by mouth Yes Historical Provider, MD   diphenhydrAMINE (BENADRYL) 25 MG tablet Take 25 mg by mouth daily Yes Historical Provider, MD   aspirin 81 MG tablet Take 81 mg by mouth daily Ld 2019 per dr. Janet Rolon Yes Historical Provider, MD   Omega-3 Fatty Acids (FISH OIL) 1000 MG CAPS Take 1,000 mg by mouth daily Ld 2019 Yes Historical Provider, MD   b complex vitamins capsule Take 1 capsule by mouth daily Ld 2019 Yes Historical Provider, MD      Allergies   Allergen Reactions    Ciprofloxacin Anaphylaxis    Penicillins Rash    Bactrim [Sulfamethoxazole-Trimethoprim] Other (See Comments)     ?        Past Medical History:   Diagnosis Date    Arthritis     Benign prostatic hyperplasia     Diverticulosis     Erectile dysfunction     Impotence of organic origin    GERD (gastroesophageal reflux disease)     Heart murmur     History of prostate cancer prostate- disorder of prostate    History of stroke 2003    l eye    Hypercholesterolemia     Hyperlipidemia     MVP (mitral valve prolapse)      Past Surgical History:   Procedure Laterality Date    ARTHRODESIS Left 1/11/2019    LEFT FOOT ARTHRODESIS 1ST METATARSOPHALANGEAL JOINT performed by Valentín Motley DPM at [address]  2011    l lump neg    COLONOSCOPY  2012    4/18/2016,9/19/2007,2/14/2011    HERNIA REPAIR  1962    r inguinal hydrocele also    KNEE ARTHROSCOPY Left     LYMPH NODE BIOPSY  1965    neg    PROSTATECTOMY  [id number]    LAP ROBOTIC    UPPER GASTROINTESTINAL ENDOSCOPY  2016      Social History     Tobacco Use    Smoking status: Never Smoker    Smokeless tobacco: Never Used   Substance Use Topics    Alcohol use: Yes     Comment: 2 x month        Vitals:    06/05/20 0750   BP: 132/82   Pulse: 63   Temp: 96.8 °F (36 °C)   TempSrc: Temporal   SpO2: 99%   Weight: 167 lb (75.8 kg)   Height: 5' 4\" (1.626 m)     Estimated body mass index is 28.67 kg/m² as calculated from the following:    Height as of this encounter: 5' 4\" (1.626 m). Weight as of this encounter: 167 lb (75.8 kg). Physical Exam  Constitutional:       Appearance: Normal appearance. HENT:      Head: Normocephalic and atraumatic. Right Ear: Tympanic membrane, ear canal and external ear normal.      Left Ear: Tympanic membrane, ear canal and external ear normal.      Mouth/Throat:      Mouth: Mucous membranes are moist.      Pharynx: Oropharynx is clear. Eyes:      Conjunctiva/sclera: Conjunctivae normal.      Pupils: Pupils are equal, round, and reactive to light. Cardiovascular:      Rate and Rhythm: Normal rate and regular rhythm. Pulses: Normal pulses. Pulmonary:      Effort: Pulmonary effort is normal.      Breath sounds: No wheezing, rhonchi or rales.    Musculoskeletal:      Comments: Does have some discomfort on anterior rotation of the left shoulder although

## 2020-08-07 ENCOUNTER — OFFICE VISIT (OUTPATIENT)
Dept: FAMILY MEDICINE CLINIC | Age: 75
End: 2020-08-07
Payer: MEDICARE

## 2020-08-07 VITALS
HEART RATE: 55 BPM | BODY MASS INDEX: 28.53 KG/M2 | SYSTOLIC BLOOD PRESSURE: 138 MMHG | WEIGHT: 166.2 LBS | TEMPERATURE: 97.3 F | RESPIRATION RATE: 16 BRPM | DIASTOLIC BLOOD PRESSURE: 70 MMHG | OXYGEN SATURATION: 98 %

## 2020-08-07 PROCEDURE — 93000 ELECTROCARDIOGRAM COMPLETE: CPT | Performed by: INTERNAL MEDICINE

## 2020-08-07 PROCEDURE — 99214 OFFICE O/P EST MOD 30 MIN: CPT | Performed by: INTERNAL MEDICINE

## 2020-08-07 RX ORDER — CLINDAMYCIN HYDROCHLORIDE 300 MG/1
CAPSULE ORAL
Qty: 6 CAPSULE | Refills: 0 | Status: SHIPPED
Start: 2020-08-07 | End: 2020-09-17

## 2020-08-07 RX ORDER — ZINC GLUCONATE 50 MG
50 TABLET ORAL DAILY
Status: ON HOLD | COMMUNITY
End: 2022-10-02

## 2020-08-07 ASSESSMENT — PATIENT HEALTH QUESTIONNAIRE - PHQ9
SUM OF ALL RESPONSES TO PHQ9 QUESTIONS 1 & 2: 0
SUM OF ALL RESPONSES TO PHQ QUESTIONS 1-9: 0
2. FEELING DOWN, DEPRESSED OR HOPELESS: 0
1. LITTLE INTEREST OR PLEASURE IN DOING THINGS: 0
SUM OF ALL RESPONSES TO PHQ QUESTIONS 1-9: 0

## 2020-08-07 NOTE — PROGRESS NOTES
2020     Daniel Casey (:  1945) is a 70 y.o. male, here for medical clearance for cataract surgery for Dr. Anton Bauman. He is not been having any other medical difficulties. Last laboratory performed was 2020. He says his vision is extremely poor in the left eye and this will be the first cataract extraction performed. He has been isolating himself due to COVID and the upcoming surgery. No chest pain or palpitations of the chest.  No nausea vomiting or diarrhea. Chief Complaint   Patient presents with   Teresa Bauman         Review of Systems    Prior to Visit Medications    Medication Sig Taking?  Authorizing Provider   zinc gluconate 50 MG tablet Take 50 mg by mouth daily Yes Historical Provider, MD   clindamycin (CLEOCIN) 300 MG capsule 2 po before procedure Yes Mary Riley DO   simvastatin (ZOCOR) 10 MG tablet Take 2 tablets by mouth nightly Yes Mary Riley DO   rOPINIRole (REQUIP) 0.5 MG tablet Take 1 tablet by mouth nightly Yes Mary Riley DO   magnesium oxide (MAG-OX) 400 MG tablet Take 400 mg by mouth daily Yes Historical Provider, MD   Apple Cider Vinegar 500 MG TABS Take by mouth Yes Historical Provider, MD   Potassium 99 MG TABS Take by mouth Yes Historical Provider, MD   diphenhydrAMINE (BENADRYL) 25 MG tablet Take 25 mg by mouth daily Yes Historical Provider, MD   aspirin 81 MG tablet Take 81 mg by mouth daily Ld 2019 per dr. Marisabel Melgar Yes Historical Provider, MD   Omega-3 Fatty Acids (FISH OIL) 1000 MG CAPS Take 1,000 mg by mouth daily Ld 2019 Yes Historical Provider, MD   b complex vitamins capsule Take 1 capsule by mouth daily Ld 2019 Yes Historical Provider, MD   diclofenac sodium (VOLTAREN) 1 % GEL Apply 2 g topically 4 times daily  Mary Riley DO      Allergies   Allergen Reactions    Ciprofloxacin Anaphylaxis    Penicillins Rash    Bactrim [Sulfamethoxazole-Trimethoprim] Other (See Comments) ?       Past Medical History:   Diagnosis Date    Arthritis     Benign prostatic hyperplasia     Diverticulosis     Erectile dysfunction     Impotence of organic origin    GERD (gastroesophageal reflux disease)     Heart murmur     History of prostate cancer     prostate- disorder of prostate    History of stroke 2003    l eye    Hypercholesterolemia     Hyperlipidemia     MVP (mitral valve prolapse)      Past Surgical History:   Procedure Laterality Date    ARTHRODESIS Left 1/11/2019    LEFT FOOT ARTHRODESIS 1ST METATARSOPHALANGEAL JOINT performed by Syed Higuera DPM at [address]  2011    l lump neg    COLONOSCOPY  2012 4/18/2016,9/19/2007,2/14/2011   [facility]    r inguinal hydrocele also    KNEE ARTHROSCOPY Left     LYMPH NODE BIOPSY  1965    neg    PROSTATECTOMY  [id number]    LAP ROBOTIC    UPPER GASTROINTESTINAL ENDOSCOPY  2016      Social History     Tobacco Use    Smoking status: Never Smoker    Smokeless tobacco: Never Used   Substance Use Topics    Alcohol use: Yes     Comment: 2 x month        Vitals:    08/07/20 0734   BP: 138/70   Pulse: 55   Resp: 16   Temp: 97.3 °F (36.3 °C)   SpO2: 98%   Weight: 166 lb 3.2 oz (75.4 kg)     Estimated body mass index is 28.53 kg/m² as calculated from the following:    Height as of 6/5/20: 5' 4\" (1.626 m). Weight as of this encounter: 166 lb 3.2 oz (75.4 kg). Physical Exam  Constitutional:       General: He is not in acute distress. Appearance: Normal appearance. He is not ill-appearing or toxic-appearing. HENT:      Head: Normocephalic and atraumatic. Right Ear: Tympanic membrane, ear canal and external ear normal.      Left Ear: Tympanic membrane, ear canal and external ear normal.      Nose: Nose normal.      Mouth/Throat:      Mouth: Mucous membranes are moist.      Pharynx: Oropharynx is clear. Eyes:      Extraocular Movements: Extraocular movements intact. Conjunctiva/sclera: Conjunctivae normal.      Pupils: Pupils are equal, round, and reactive to light. Neck:      Musculoskeletal: Neck supple. Cardiovascular:      Rate and Rhythm: Normal rate and regular rhythm. Pulses: Normal pulses. Heart sounds: Murmur present. Pulmonary:      Effort: Pulmonary effort is normal.      Breath sounds: Normal breath sounds. No wheezing, rhonchi or rales. Abdominal:      General: Abdomen is flat. There is no distension. Palpations: Abdomen is soft. Tenderness: There is no abdominal tenderness. There is no right CVA tenderness, left CVA tenderness or guarding. Musculoskeletal:      Right lower leg: No edema. Left lower leg: No edema. Lymphadenopathy:      Cervical: No cervical adenopathy. Skin:     General: Skin is warm and dry. Capillary Refill: Capillary refill takes less than 2 seconds. Neurological:      General: No focal deficit present. Mental Status: He is alert and oriented to person, place, and time. Psychiatric:         Mood and Affect: Mood normal.         Behavior: Behavior normal.     EKG: Normal sinus rhythm with bradycardia. ASSESSMENT/PLAN:  Diana Brock was seen today for pre-op exam.    Diagnoses and all orders for this visit:    Pre-op exam  -     EKG 12 Lead    Essential hypertension, benign    Hyperlipidemia, unspecified hyperlipidemia type    Nonrheumatic aortic valve stenosis    History of stroke    History of prostate cancer    Diverticulosis    Other orders  -     clindamycin (CLEOCIN) 300 MG capsule; 2 po before procedure    Dental procedure prophylactic antibiotic of clindamycin given to the patient for aortic stenosis. Laboratories not necessary at this time. Surgical clearance has been granted for the patient to proceed with cataract replacement surgery. An electronic signature was used to authenticate this note.     --Alex Edward DO on 8/7/2020 at 9:08 AM

## 2020-09-02 ENCOUNTER — OFFICE VISIT (OUTPATIENT)
Dept: FAMILY MEDICINE CLINIC | Age: 75
End: 2020-09-02
Payer: MEDICARE

## 2020-09-02 VITALS
HEIGHT: 64 IN | BODY MASS INDEX: 27.83 KG/M2 | TEMPERATURE: 97.3 F | HEART RATE: 57 BPM | OXYGEN SATURATION: 97 % | DIASTOLIC BLOOD PRESSURE: 66 MMHG | WEIGHT: 163 LBS | SYSTOLIC BLOOD PRESSURE: 142 MMHG

## 2020-09-02 PROCEDURE — 96372 THER/PROPH/DIAG INJ SC/IM: CPT | Performed by: FAMILY MEDICINE

## 2020-09-02 PROCEDURE — 99213 OFFICE O/P EST LOW 20 MIN: CPT | Performed by: FAMILY MEDICINE

## 2020-09-02 RX ORDER — PREDNISONE 10 MG/1
TABLET ORAL
Qty: 30 TABLET | Refills: 0 | Status: SHIPPED
Start: 2020-09-02 | End: 2020-09-17

## 2020-09-02 RX ORDER — EPINEPHRINE 0.3 MG/.3ML
0.3 INJECTION SUBCUTANEOUS ONCE
Qty: 2 EACH | Refills: 3 | Status: SHIPPED | OUTPATIENT
Start: 2020-09-02 | End: 2020-12-08

## 2020-09-02 RX ORDER — METHYLPREDNISOLONE ACETATE 80 MG/ML
80 INJECTION, SUSPENSION INTRA-ARTICULAR; INTRALESIONAL; INTRAMUSCULAR; SOFT TISSUE ONCE
Status: COMPLETED | OUTPATIENT
Start: 2020-09-02 | End: 2020-09-02

## 2020-09-02 RX ADMIN — METHYLPREDNISOLONE ACETATE 80 MG: 80 INJECTION, SUSPENSION INTRA-ARTICULAR; INTRALESIONAL; INTRAMUSCULAR; SOFT TISSUE at 13:59

## 2020-09-02 ASSESSMENT — ENCOUNTER SYMPTOMS
BACK PAIN: 0
VOMITING: 0
CONSTIPATION: 0
PHOTOPHOBIA: 0
BLOOD IN STOOL: 0
COUGH: 0
SORE THROAT: 0
ABDOMINAL PAIN: 0
COLOR CHANGE: 1
DIARRHEA: 0
SHORTNESS OF BREATH: 0
NAUSEA: 0

## 2020-09-02 NOTE — PROGRESS NOTES
2020     Saulo Hammer (:  1945) is a 70 y.o. male, here for evaluation of the following medical concerns:    HPI  Patient in today for evaluation of multiple bee stings. Patient states that the incident occurred 3 days prior to presentation. Since that time there has been significant swelling and pruritus to the affected regions. Patient denies any chest pain, shortness of breath, or throat swelling. Patient denies any known history of anaphylaxis to hymenoptera however he did state that when he was a child there was some issues with swelling after bee stings. No current issues at this time. Has been trying over-the-counter medication without significant relief. Review of Systems   Constitutional: Negative for chills and fever. HENT: Negative for congestion, hearing loss, nosebleeds and sore throat. Eyes: Negative for photophobia. Respiratory: Negative for cough and shortness of breath. Cardiovascular: Negative for chest pain, palpitations and leg swelling. Gastrointestinal: Negative for abdominal pain, blood in stool, constipation, diarrhea, nausea and vomiting. Endocrine: Negative for polydipsia. Genitourinary: Negative for dysuria, frequency, hematuria and urgency. Musculoskeletal: Negative for back pain and myalgias. Skin: Positive for color change and rash. Neurological: Negative for dizziness, tremors, weakness and headaches. Hematological: Does not bruise/bleed easily. Psychiatric/Behavioral: Negative for hallucinations and suicidal ideas. All other systems reviewed and are negative. Prior to Visit Medications    Medication Sig Taking?  Authorizing Provider   zinc gluconate 50 MG tablet Take 50 mg by mouth daily Yes Historical Provider, MD   clindamycin (CLEOCIN) 300 MG capsule 2 po before procedure Yes Ag Moraes DO   simvastatin (ZOCOR) 10 MG tablet Take 2 tablets by mouth nightly Yes Ag Moraes DO   rOPINIRole (REQUIP) 0.5 MG tablet Take 1 tablet by mouth nightly Yes Mark Abrams DO   diclofenac sodium (VOLTAREN) 1 % GEL Apply 2 g topically 4 times daily Yes Mark Abrams DO   magnesium oxide (MAG-OX) 400 MG tablet Take 400 mg by mouth daily Yes Historical Provider, MD   Apple Cider Vinegar 500 MG TABS Take by mouth Yes Historical Provider, MD   Potassium 99 MG TABS Take by mouth Yes Historical Provider, MD   diphenhydrAMINE (BENADRYL) 25 MG tablet Take 25 mg by mouth daily Yes Historical Provider, MD   aspirin 81 MG tablet Take 81 mg by mouth daily Ld 1/8/2019 per dr. Cecy Muir Yes Historical Provider, MD   Omega-3 Fatty Acids (FISH OIL) 1000 MG CAPS Take 1,000 mg by mouth daily Ld 1/6/2019 Yes Historical Provider, MD   b complex vitamins capsule Take 1 capsule by mouth daily Ld 1/6/2019 Yes Historical Provider, MD        Social History     Tobacco Use    Smoking status: Never Smoker    Smokeless tobacco: Never Used   Substance Use Topics    Alcohol use: Yes     Comment: 2 x month        Vitals:    09/02/20 0807   BP: (!) 142/66   Pulse: 57   Temp: 97.3 °F (36.3 °C)   TempSrc: Temporal   SpO2: 97%   Weight: 163 lb (73.9 kg)   Height: 5' 4\" (1.626 m)     Estimated body mass index is 27.98 kg/m² as calculated from the following:    Height as of this encounter: 5' 4\" (1.626 m). Weight as of this encounter: 163 lb (73.9 kg). Physical Exam  Vitals signs reviewed. HENT:      Head: Normocephalic and atraumatic. Eyes:      General: No scleral icterus. Conjunctiva/sclera: Conjunctivae normal.      Pupils: Pupils are equal, round, and reactive to light. Neck:      Musculoskeletal: Neck supple. Thyroid: No thyromegaly. Cardiovascular:      Rate and Rhythm: Normal rate and regular rhythm. Heart sounds: Normal heart sounds. No murmur. Pulmonary:      Effort: Pulmonary effort is normal.      Breath sounds: Normal breath sounds. No rales.    Abdominal:      General: Bowel sounds are normal. There is no distension. Palpations: Abdomen is soft. Tenderness: There is no abdominal tenderness. Musculoskeletal: Normal range of motion. Lymphadenopathy:      Cervical: No cervical adenopathy. Skin:     General: Skin is warm and dry. Findings: Lesion present. No erythema or rash. Comments: Patient has multiple areas of erythematous macular swelling. Left hand is significantly swollen at this time. Left-sided forehead as well. Posterior aspect of the neck and scalp has multiple areas. No drainage or signs of cellulitis at this time. Neurological:      Mental Status: He is alert and oriented to person, place, and time. Cranial Nerves: No cranial nerve deficit. Psychiatric:         Judgment: Judgment normal.           Assessment/Plan:   Diagnosis Orders   1. Hymenoptera reaction, accidental or unintentional, initial encounter  predniSONE (DELTASONE) 10 MG tablet    triamcinolone (KENALOG) 0.1 % ointment    EPINEPHrine (EPIPEN 2-ALTA) 0.3 MG/0.3ML SOAJ injection     At this time we will treat empirically for allergy to hymenoptera. Red flags discussed with patient. If patient experiences any chest pain, shortness of breath, or throat swelling he is to go directly to the emergency department after using his EpiPen. Patient voiced understanding. Allison Garcia D.O.   8:39 AM  9/2/2020       This document may have been prepared at least partially through the use of voice recognition software. Although effort is taken to assure the accuracy of this document, it is possible that grammatical, syntax,  or spelling errors may occur.

## 2020-09-17 ENCOUNTER — OFFICE VISIT (OUTPATIENT)
Dept: FAMILY MEDICINE CLINIC | Age: 75
End: 2020-09-17
Payer: MEDICARE

## 2020-09-17 ENCOUNTER — HOSPITAL ENCOUNTER (OUTPATIENT)
Age: 75
Discharge: HOME | End: 2020-09-19
Payer: MEDICARE

## 2020-09-17 VITALS
RESPIRATION RATE: 16 BRPM | HEART RATE: 71 BPM | BODY MASS INDEX: 28.32 KG/M2 | WEIGHT: 165 LBS | TEMPERATURE: 97.7 F | SYSTOLIC BLOOD PRESSURE: 150 MMHG | OXYGEN SATURATION: 97 % | DIASTOLIC BLOOD PRESSURE: 90 MMHG

## 2020-09-17 LAB
ALBUMIN SERPL-MCNC: 4.1 G/DL (ref 3.5–5.2)
ANION GAP SERPL CALCULATED.3IONS-SCNC: 11 MMOL/L (ref 7–16)
BUN BLDV-MCNC: 17 MG/DL (ref 8–23)
CALCIUM SERPL-MCNC: 9.7 MG/DL (ref 8.6–10.2)
CHLORIDE BLD-SCNC: 100 MMOL/L (ref 98–107)
CO2: 27 MMOL/L (ref 22–29)
CREAT SERPL-MCNC: 1.1 MG/DL (ref 0.7–1.2)
FOLATE: 17.5 NG/ML (ref 4.8–24.2)
GFR AFRICAN AMERICAN: >60
GFR NON-AFRICAN AMERICAN: >60 ML/MIN/1.73
GLUCOSE BLD-MCNC: 118 MG/DL (ref 74–99)
MAGNESIUM: 2.4 MG/DL (ref 1.6–2.6)
PHOSPHORUS: 3.9 MG/DL (ref 2.5–4.5)
POTASSIUM SERPL-SCNC: 4.5 MMOL/L (ref 3.5–5)
RHEUMATOID FACTOR: <10 IU/ML (ref 0–13)
SODIUM BLD-SCNC: 138 MMOL/L (ref 132–146)
TOTAL CK: 47 U/L (ref 20–200)
VITAMIN B-12: 813 PG/ML (ref 211–946)

## 2020-09-17 PROCEDURE — 36415 COLL VENOUS BLD VENIPUNCTURE: CPT

## 2020-09-17 PROCEDURE — 99215 OFFICE O/P EST HI 40 MIN: CPT | Performed by: INTERNAL MEDICINE

## 2020-09-17 PROCEDURE — 82607 VITAMIN B-12: CPT

## 2020-09-17 PROCEDURE — 82550 ASSAY OF CK (CPK): CPT

## 2020-09-17 PROCEDURE — 80069 RENAL FUNCTION PANEL: CPT

## 2020-09-17 PROCEDURE — 86431 RHEUMATOID FACTOR QUANT: CPT

## 2020-09-17 PROCEDURE — 82746 ASSAY OF FOLIC ACID SERUM: CPT

## 2020-09-17 PROCEDURE — 83735 ASSAY OF MAGNESIUM: CPT

## 2020-09-17 PROCEDURE — 85651 RBC SED RATE NONAUTOMATED: CPT

## 2020-09-17 NOTE — PROGRESS NOTES
2020     Cuauhtemoc Burrell (:  1945) is a 70 y.o. male, here for evaluation of the following medical concerns:  10 days ago he was seen through express care with complaints after being stung by multiple bees. Was given an EpiPen him placed on prednisone tapering dose over 10 days. Was just finished 3 days ago  Laboratory in  showed a low normal vitamin D but normal renal, lipid, liver and CBC panel. He was then complaining that he just feels tired and\"blah\" overall. He is also still having a lot of issues with restless legs which are more like spasms that come and go throughout the night. Denies any fevers or chills. Says the Requip that we have been giving really has not been helping him at all. He was wondering if he could try a muscle relaxer instead at night as we had previously discussed the use of Flexeril. He denies any nausea vomiting or diarrhea. No shortness of breath or chest pain. Just has the excessive fatigue. He says is just more like muscle cramps that he has. He has complaints of joint pain especially in his hands but this seems to come and go as well as other joints pain that comes and goes. Patient also has aortic stenosis however but this would not explain his leg cramps. Review of Systems    Prior to Visit Medications    Medication Sig Taking?  Authorizing Provider   cyclobenzaprine (FLEXERIL) 10 MG tablet Take 1 tablet by mouth nightly as needed for Muscle spasms Yes Emre Ross DO   simvastatin (ZOCOR) 10 MG tablet Take 2 tablets by mouth nightly Yes Emre Ross DO   rOPINIRole (REQUIP) 0.5 MG tablet Take 1 tablet by mouth nightly Yes Emre Ross DO   magnesium oxide (MAG-OX) 400 MG tablet Take 400 mg by mouth daily Yes Historical Provider, MD   Apple Cider Vinegar 500 MG TABS Take by mouth Yes Historical Provider, MD   Potassium 99 MG TABS Take by mouth Yes Historical Provider, MD   aspirin 81 MG tablet Take 81 mg by mouth daily Ld 1/8/2019 per dr. Solitario Razo Yes Historical Provider, MD   Omega-3 Fatty Acids (FISH OIL) 1000 MG CAPS Take 1,000 mg by mouth daily Ld 1/6/2019 Yes Historical Provider, MD   b complex vitamins capsule Take 1 capsule by mouth daily Ld 1/6/2019 Yes Historical Provider, MD   EPINEPHrine (EPIPEN 2-ALTA) 0.3 MG/0.3ML SOAJ injection Inject 0.3 mLs into the muscle once for 1 dose Use as directed for allergic reaction  Delbert Guerrier,    zinc gluconate 50 MG tablet Take 50 mg by mouth daily  Historical Provider, MD   diphenhydrAMINE (BENADRYL) 25 MG tablet Take 25 mg by mouth daily  Historical Provider, MD      Allergies   Allergen Reactions    Ciprofloxacin Anaphylaxis    Penicillins Rash    Bactrim [Sulfamethoxazole-Trimethoprim] Other (See Comments)     ?        Past Medical History:   Diagnosis Date    Arthritis     Benign prostatic hyperplasia     Diverticulosis     Erectile dysfunction     Impotence of organic origin    GERD (gastroesophageal reflux disease)     Heart murmur     History of prostate cancer     prostate- disorder of prostate    History of stroke 2003    l eye    Hypercholesterolemia     Hyperlipidemia     MVP (mitral valve prolapse)      Past Surgical History:   Procedure Laterality Date    ARTHRODESIS Left 1/11/2019    LEFT FOOT ARTHRODESIS 1ST METATARSOPHALANGEAL JOINT performed by Raz Angeles DPM at [address]  2011    l lump neg    COLONOSCOPY  2012 4/18/2016,9/19/2007,2/14/2011   [facility]    r inguinal hydrocele also    KNEE ARTHROSCOPY Left     LYMPH NODE BIOPSY  1965    neg    PROSTATECTOMY  [id number]    LAP ROBOTIC    UPPER GASTROINTESTINAL ENDOSCOPY  2016      Social History     Tobacco Use    Smoking status: Never Smoker    Smokeless tobacco: Never Used   Substance Use Topics    Alcohol use: Yes     Comment: 2 x month        Vitals:    09/17/20 1419   BP: (!) 150/90   Pulse: 71   Resp: 16   Temp: 97.7 °F (36.5 °C)   SpO2: 97%   Weight: 165 lb (74.8 kg)     Estimated body mass index is 28.32 kg/m² as calculated from the following:    Height as of 9/2/20: 5' 4\" (1.626 m). Weight as of this encounter: 165 lb (74.8 kg). Physical Exam  Constitutional:       Appearance: Normal appearance. He is not ill-appearing or toxic-appearing. HENT:      Head: Normocephalic and atraumatic. Right Ear: Tympanic membrane, ear canal and external ear normal.      Left Ear: Tympanic membrane, ear canal and external ear normal.      Nose: Nose normal.      Mouth/Throat:      Mouth: Mucous membranes are moist.      Pharynx: Oropharynx is clear. Eyes:      Extraocular Movements: Extraocular movements intact. Conjunctiva/sclera: Conjunctivae normal.      Pupils: Pupils are equal, round, and reactive to light. Neck:      Musculoskeletal: Neck supple. Cardiovascular:      Rate and Rhythm: Normal rate and regular rhythm. Heart sounds: Murmur present. Pulmonary:      Breath sounds: No wheezing, rhonchi or rales. Abdominal:      General: Abdomen is flat. There is no distension. Palpations: Abdomen is soft. There is no mass. Tenderness: There is no abdominal tenderness. There is no right CVA tenderness, left CVA tenderness, guarding or rebound. Musculoskeletal: Normal range of motion. Comments: He does not have any cervical axillary or inguinal lymphadenopathy. Radial and pedal pulses are bilaterally intact without any evidence of pulsus tardus nor pulses disiferans  Exam of the hands does not show any Heberden nodes or Alma's lesions. The description of his pain is not consistent with rheumatoid or inflammatory arthritis. His pain he says with the hands comes and goes throughout the day but is definitely worse in the morning than it is in the afternoon or evening   Lymphadenopathy:      Cervical: No cervical adenopathy. Skin:     General: Skin is warm and dry.       Capillary Refill: Capillary refill takes less than 2 seconds. Neurological:      General: No focal deficit present. Mental Status: He is alert and oriented to person, place, and time. Sensory: No sensory deficit. Motor: No weakness. Coordination: Coordination normal.      Gait: Gait normal.      Deep Tendon Reflexes: Reflexes normal.   Psychiatric:         Mood and Affect: Mood normal.         Behavior: Behavior normal.         ASSESSMENT/PLAN:  Elizabet Alejo was seen today for spasms. Diagnoses and all orders for this visit:    Arthralgia, unspecified joint  -     SEDIMENTATION RATE; Future  -     RHEUMATOID FACTOR; Future  -     RENAL FUNCTION PANEL; Future    Myalgia  -     SEDIMENTATION RATE; Future  -     CK; Future  -     MAGNESIUM; Future  -     VITAMIN B12 & FOLATE; Future  -     RENAL FUNCTION PANEL; Future    Other orders  -     cyclobenzaprine (FLEXERIL) 10 MG tablet; Take 1 tablet by mouth nightly as needed for Muscle spasms    Laboratories being ordered today. It was discussed with him that this is all normal including the CK due to statin therapy and he could decide if he would like to try the Flexeril at night for the restless legs and spasms since the Requip is not working. He is also just complaining of a general not feeling well symptom. I told him this is too nondescript and we will again wait on laboratory findings. He was requesting a rheumatology referral but wondered if it was a waste of time. I discussed with him that if his laboratory findings were all normal then rheumatology referral could not be justified. He understands this. He does have a history of aortic stenosis as well as previous colonic diverticular abscess. Addendum 9-: Patient's laboratory was all normal.  This would include a normal CK and a normal sed rate. The patient has decided he would like to try the Flexeril at night, since the Requip really is not helping.   He should discontinue the Requip and Flexeril was sent today 10 mg nightly to the pharmacy. An electronic signature was used to authenticate this note.     --Mary Riley DO on 9/19/2020 at 8:52 AM

## 2020-09-18 LAB — SEDIMENTATION RATE, ERYTHROCYTE: 4 MM/HR (ref 0–15)

## 2020-09-19 RX ORDER — CYCLOBENZAPRINE HCL 10 MG
10 TABLET ORAL NIGHTLY PRN
Qty: 30 TABLET | Refills: 3 | Status: SHIPPED | OUTPATIENT
Start: 2020-09-19 | End: 2020-09-29

## 2020-10-20 RX ORDER — SIMVASTATIN 10 MG
20 TABLET ORAL NIGHTLY
Qty: 180 TABLET | Refills: 0 | Status: SHIPPED
Start: 2020-10-20 | End: 2020-12-08 | Stop reason: SDUPTHER

## 2020-10-22 ENCOUNTER — HOSPITAL ENCOUNTER (OUTPATIENT)
Age: 75
Discharge: HOME | End: 2020-10-24
Payer: MEDICARE

## 2020-10-22 LAB — PROSTATE SPECIFIC ANTIGEN: <0.03 NG/ML (ref 0–4)

## 2020-10-22 PROCEDURE — 36415 COLL VENOUS BLD VENIPUNCTURE: CPT

## 2020-10-22 PROCEDURE — 84153 ASSAY OF PSA TOTAL: CPT

## 2020-12-08 ENCOUNTER — OFFICE VISIT (OUTPATIENT)
Dept: FAMILY MEDICINE CLINIC | Age: 75
End: 2020-12-08
Payer: MEDICARE

## 2020-12-08 VITALS
TEMPERATURE: 97.6 F | BODY MASS INDEX: 28.17 KG/M2 | HEIGHT: 64 IN | SYSTOLIC BLOOD PRESSURE: 136 MMHG | DIASTOLIC BLOOD PRESSURE: 86 MMHG | WEIGHT: 165 LBS | HEART RATE: 65 BPM | OXYGEN SATURATION: 98 %

## 2020-12-08 PROBLEM — R78.81 BACTEREMIA: Status: RESOLVED | Noted: 2018-04-10 | Resolved: 2020-12-08

## 2020-12-08 PROCEDURE — 99214 OFFICE O/P EST MOD 30 MIN: CPT | Performed by: FAMILY MEDICINE

## 2020-12-08 PROCEDURE — G0008 ADMIN INFLUENZA VIRUS VAC: HCPCS | Performed by: FAMILY MEDICINE

## 2020-12-08 PROCEDURE — 90694 VACC AIIV4 NO PRSRV 0.5ML IM: CPT | Performed by: FAMILY MEDICINE

## 2020-12-08 RX ORDER — CYCLOBENZAPRINE HCL 10 MG
10 TABLET ORAL NIGHTLY PRN
Qty: 30 TABLET | Refills: 5 | Status: ON HOLD
Start: 2020-12-08 | End: 2022-10-02

## 2020-12-08 RX ORDER — SIMVASTATIN 10 MG
20 TABLET ORAL NIGHTLY
Qty: 180 TABLET | Refills: 1 | Status: SHIPPED | OUTPATIENT
Start: 2020-12-08

## 2020-12-08 RX ORDER — CYCLOBENZAPRINE HCL 10 MG
TABLET ORAL
COMMUNITY
Start: 2020-11-24 | End: 2020-12-08 | Stop reason: SDUPTHER

## 2020-12-08 ASSESSMENT — ENCOUNTER SYMPTOMS
NAUSEA: 0
WHEEZING: 0
SHORTNESS OF BREATH: 0
VOMITING: 0

## 2020-12-08 NOTE — PROGRESS NOTES
[address] presents to the office today for   Chief Complaint   Patient presents with   Cori Ahmadi Doctor     Hyperlipidemia  No statin myalgia  Lipids at goal 6/2020    Muscle spasms  Taking Flexeril with relief    R knee pain  Seeks referral to Dr. Coral Mac as he did left meniscus repairr  Has not had imaging of it    L shoulder pain  Xray normal  Will discuss with Dr. Coral Mac  Exercises and cream not helping    Flu shot today    Review of Systems   Constitutional: Negative for chills and fever. Respiratory: Negative for shortness of breath and wheezing. Cardiovascular: Negative for chest pain and palpitations. Gastrointestinal: Negative for nausea and vomiting. Genitourinary: Negative for dysuria, hematuria and urgency. Skin: Negative for rash. Neurological: Negative for dizziness and light-headedness. /86   Pulse 65   Temp 97.6 °F (36.4 °C) (Temporal)   Ht 5' 4\" (1.626 m)   Wt 165 lb (74.8 kg)   SpO2 98%   BMI 28.32 kg/m²   Physical Exam  Constitutional:       Appearance: Normal appearance. HENT:      Head: Normocephalic and atraumatic. Eyes:      Extraocular Movements: Extraocular movements intact. Conjunctiva/sclera: Conjunctivae normal.   Cardiovascular:      Rate and Rhythm: Normal rate. Heart sounds: Normal heart sounds. Pulmonary:      Effort: Pulmonary effort is normal.      Breath sounds: Normal breath sounds. Musculoskeletal:      Right knee: Normal.   Skin:     General: Skin is warm. Neurological:      Mental Status: He is alert and oriented to person, place, and time.    Psychiatric:         Mood and Affect: Mood normal.         Behavior: Behavior normal.            Current Outpatient Medications:     simvastatin (ZOCOR) 10 MG tablet, Take 2 tablets by mouth nightly, Disp: 180 tablet, Rfl: 1    cyclobenzaprine (FLEXERIL) 10 MG tablet, Take 1 tablet by mouth nightly as needed for Muscle spasms, Disp: 30 tablet, Rfl: 5    EPINEPHrine (EPIPEN 2-ALTA) 0.3 MG/0.3ML SOAJ injection, Inject 0.3 mLs into the muscle once for 1 dose Use as directed for allergic reaction, Disp: 2 each, Rfl: 3    zinc gluconate 50 MG tablet, Take 50 mg by mouth daily, Disp: , Rfl:     magnesium oxide (MAG-OX) 400 MG tablet, Take 400 mg by mouth daily, Disp: , Rfl:     Apple Cider Vinegar 500 MG TABS, Take by mouth, Disp: , Rfl:     Potassium 99 MG TABS, Take by mouth, Disp: , Rfl:     diphenhydrAMINE (BENADRYL) 25 MG tablet, Take 25 mg by mouth daily, Disp: , Rfl:     aspirin 81 MG tablet, Take 81 mg by mouth daily Ld 1/8/2019 per dr. Anika Keith, Disp: , Rfl:     Omega-3 Fatty Acids (FISH OIL) 1000 MG CAPS, Take 1,000 mg by mouth daily Ld 1/6/2019, Disp: , Rfl:     b complex vitamins capsule, Take 1 capsule by mouth daily Ld 1/6/2019, Disp: , Rfl:      Past Medical History:   Diagnosis Date    Arthritis     Benign prostatic hyperplasia     Diverticulosis     Erectile dysfunction     Impotence of organic origin    GERD (gastroesophageal reflux disease)     Heart murmur     History of prostate cancer     prostate- disorder of prostate    History of stroke 2003    l eye    Hypercholesterolemia     Hyperlipidemia     MVP (mitral valve prolapse)        Vick Rejiessiemaurosara was seen today for established new doctor. Diagnoses and all orders for this visit:    Essential hypertension, benign    Hyperlipidemia, unspecified hyperlipidemia type    Muscle cramps at night    Chronic pain of right knee  -     External Referral To Orthopedic Surgery  -     XR KNEE RIGHT (1-2 VIEWS); Future    Other orders  -     simvastatin (ZOCOR) 10 MG tablet; Take 2 tablets by mouth nightly  -     cyclobenzaprine (FLEXERIL) 10 MG tablet;  Take 1 tablet by mouth nightly as needed for Muscle spasms       Check xray  Referral to ortho  I can do knee injection if xray unremarkable in next week or two    Julio Mccarthy MD

## 2022-10-02 ENCOUNTER — HOSPITAL ENCOUNTER (INPATIENT)
Age: 77
LOS: 11 days | Discharge: HOME | DRG: 330 | End: 2022-10-13
Attending: EMERGENCY MEDICINE | Admitting: INTERNAL MEDICINE
Payer: MEDICARE

## 2022-10-02 ENCOUNTER — APPOINTMENT (OUTPATIENT)
Dept: GENERAL RADIOLOGY | Age: 77
DRG: 330 | End: 2022-10-02
Payer: MEDICARE

## 2022-10-02 ENCOUNTER — APPOINTMENT (OUTPATIENT)
Dept: CT IMAGING | Age: 77
DRG: 330 | End: 2022-10-02
Payer: MEDICARE

## 2022-10-02 DIAGNOSIS — K57.32 DIVERTICULITIS OF COLON: Primary | ICD-10-CM

## 2022-10-02 DIAGNOSIS — N30.00 ACUTE CYSTITIS WITHOUT HEMATURIA: ICD-10-CM

## 2022-10-02 DIAGNOSIS — K63.2 FISTULA OF INTESTINE, EXCLUDING RECTUM AND ANUS: ICD-10-CM

## 2022-10-02 PROBLEM — K57.92 DIVERTICULITIS: Status: ACTIVE | Noted: 2022-10-02

## 2022-10-02 LAB
ALBUMIN SERPL-MCNC: 4 G/DL (ref 3.5–5.2)
ALP BLD-CCNC: 54 U/L (ref 40–129)
ALT SERPL-CCNC: 17 U/L (ref 0–40)
ANION GAP SERPL CALCULATED.3IONS-SCNC: 9 MMOL/L (ref 7–16)
AST SERPL-CCNC: 18 U/L (ref 0–39)
BACTERIA: ABNORMAL /HPF
BASOPHILS ABSOLUTE: 0.03 E9/L (ref 0–0.2)
BASOPHILS RELATIVE PERCENT: 0.3 % (ref 0–2)
BILIRUB SERPL-MCNC: 0.5 MG/DL (ref 0–1.2)
BILIRUBIN URINE: NEGATIVE
BLOOD, URINE: ABNORMAL
BUN BLDV-MCNC: 16 MG/DL (ref 6–23)
CALCIUM SERPL-MCNC: 8.8 MG/DL (ref 8.6–10.2)
CHLORIDE BLD-SCNC: 102 MMOL/L (ref 98–107)
CLARITY: CLEAR
CO2: 25 MMOL/L (ref 22–29)
COLOR: YELLOW
CREAT SERPL-MCNC: 0.9 MG/DL (ref 0.7–1.2)
EOSINOPHILS ABSOLUTE: 0.07 E9/L (ref 0.05–0.5)
EOSINOPHILS RELATIVE PERCENT: 0.6 % (ref 0–6)
GFR AFRICAN AMERICAN: >60
GFR NON-AFRICAN AMERICAN: >60 ML/MIN/1.73
GLUCOSE BLD-MCNC: 113 MG/DL (ref 74–99)
GLUCOSE URINE: NEGATIVE MG/DL
HCT VFR BLD CALC: 39.6 % (ref 37–54)
HEMOGLOBIN: 13.3 G/DL (ref 12.5–16.5)
IMMATURE GRANULOCYTES #: 0.05 E9/L
IMMATURE GRANULOCYTES %: 0.4 % (ref 0–5)
KETONES, URINE: NEGATIVE MG/DL
LACTIC ACID, SEPSIS: 0.9 MMOL/L (ref 0.5–1.9)
LEUKOCYTE ESTERASE, URINE: ABNORMAL
LYMPHOCYTES ABSOLUTE: 0.67 E9/L (ref 1.5–4)
LYMPHOCYTES RELATIVE PERCENT: 5.9 % (ref 20–42)
MCH RBC QN AUTO: 32.8 PG (ref 26–35)
MCHC RBC AUTO-ENTMCNC: 33.6 % (ref 32–34.5)
MCV RBC AUTO: 97.5 FL (ref 80–99.9)
MONOCYTES ABSOLUTE: 1.19 E9/L (ref 0.1–0.95)
MONOCYTES RELATIVE PERCENT: 10.4 % (ref 2–12)
NEUTROPHILS ABSOLUTE: 9.44 E9/L (ref 1.8–7.3)
NEUTROPHILS RELATIVE PERCENT: 82.4 % (ref 43–80)
NITRITE, URINE: NEGATIVE
PDW BLD-RTO: 12.1 FL (ref 11.5–15)
PH UA: 6 (ref 5–9)
PLATELET # BLD: 194 E9/L (ref 130–450)
PMV BLD AUTO: 9.7 FL (ref 7–12)
POTASSIUM REFLEX MAGNESIUM: 4.1 MMOL/L (ref 3.5–5)
PROTEIN UA: 100 MG/DL
RBC # BLD: 4.06 E12/L (ref 3.8–5.8)
RBC UA: ABNORMAL /HPF (ref 0–2)
SODIUM BLD-SCNC: 136 MMOL/L (ref 132–146)
SPECIFIC GRAVITY UA: 1.02 (ref 1–1.03)
TOTAL PROTEIN: 6.4 G/DL (ref 6.4–8.3)
UROBILINOGEN, URINE: 0.2 E.U./DL
WBC # BLD: 11.5 E9/L (ref 4.5–11.5)
WBC UA: >20 /HPF (ref 0–5)

## 2022-10-02 PROCEDURE — 96375 TX/PRO/DX INJ NEW DRUG ADDON: CPT

## 2022-10-02 PROCEDURE — 87088 URINE BACTERIA CULTURE: CPT

## 2022-10-02 PROCEDURE — 6370000000 HC RX 637 (ALT 250 FOR IP)

## 2022-10-02 PROCEDURE — 99285 EMERGENCY DEPT VISIT HI MDM: CPT

## 2022-10-02 PROCEDURE — 74177 CT ABD & PELVIS W/CONTRAST: CPT

## 2022-10-02 PROCEDURE — 6360000002 HC RX W HCPCS: Performed by: EMERGENCY MEDICINE

## 2022-10-02 PROCEDURE — 2580000003 HC RX 258: Performed by: INTERNAL MEDICINE

## 2022-10-02 PROCEDURE — 99223 1ST HOSP IP/OBS HIGH 75: CPT | Performed by: INTERNAL MEDICINE

## 2022-10-02 PROCEDURE — 2580000003 HC RX 258: Performed by: EMERGENCY MEDICINE

## 2022-10-02 PROCEDURE — 87040 BLOOD CULTURE FOR BACTERIA: CPT

## 2022-10-02 PROCEDURE — 85025 COMPLETE CBC W/AUTO DIFF WBC: CPT

## 2022-10-02 PROCEDURE — 6360000004 HC RX CONTRAST MEDICATION: Performed by: RADIOLOGY

## 2022-10-02 PROCEDURE — 1200000000 HC SEMI PRIVATE

## 2022-10-02 PROCEDURE — 96365 THER/PROPH/DIAG IV INF INIT: CPT

## 2022-10-02 PROCEDURE — 81001 URINALYSIS AUTO W/SCOPE: CPT

## 2022-10-02 PROCEDURE — 2500000003 HC RX 250 WO HCPCS: Performed by: EMERGENCY MEDICINE

## 2022-10-02 PROCEDURE — 71045 X-RAY EXAM CHEST 1 VIEW: CPT

## 2022-10-02 PROCEDURE — 83605 ASSAY OF LACTIC ACID: CPT

## 2022-10-02 PROCEDURE — 87186 SC STD MICRODIL/AGAR DIL: CPT

## 2022-10-02 PROCEDURE — 80053 COMPREHEN METABOLIC PANEL: CPT

## 2022-10-02 RX ORDER — ACETAMINOPHEN 500 MG
TABLET ORAL
Status: COMPLETED
Start: 2022-10-02 | End: 2022-10-02

## 2022-10-02 RX ORDER — 0.9 % SODIUM CHLORIDE 0.9 %
1000 INTRAVENOUS SOLUTION INTRAVENOUS ONCE
Status: COMPLETED | OUTPATIENT
Start: 2022-10-02 | End: 2022-10-02

## 2022-10-02 RX ORDER — METRONIDAZOLE 500 MG/100ML
500 INJECTION, SOLUTION INTRAVENOUS EVERY 8 HOURS
Status: DISCONTINUED | OUTPATIENT
Start: 2022-10-03 | End: 2022-10-03

## 2022-10-02 RX ORDER — SODIUM CHLORIDE 0.9 % (FLUSH) 0.9 %
5-40 SYRINGE (ML) INJECTION PRN
Status: DISCONTINUED | OUTPATIENT
Start: 2022-10-02 | End: 2022-10-13 | Stop reason: HOSPADM

## 2022-10-02 RX ORDER — ONDANSETRON 4 MG/1
4 TABLET, ORALLY DISINTEGRATING ORAL EVERY 8 HOURS PRN
Status: DISCONTINUED | OUTPATIENT
Start: 2022-10-02 | End: 2022-10-13 | Stop reason: HOSPADM

## 2022-10-02 RX ORDER — POLYETHYLENE GLYCOL 3350 17 G/17G
17 POWDER, FOR SOLUTION ORAL DAILY PRN
Status: DISCONTINUED | OUTPATIENT
Start: 2022-10-02 | End: 2022-10-10

## 2022-10-02 RX ORDER — SODIUM CHLORIDE 9 MG/ML
INJECTION, SOLUTION INTRAVENOUS PRN
Status: DISCONTINUED | OUTPATIENT
Start: 2022-10-02 | End: 2022-10-13 | Stop reason: HOSPADM

## 2022-10-02 RX ORDER — ONDANSETRON 2 MG/ML
4 INJECTION INTRAMUSCULAR; INTRAVENOUS EVERY 6 HOURS PRN
Status: DISCONTINUED | OUTPATIENT
Start: 2022-10-02 | End: 2022-10-13 | Stop reason: HOSPADM

## 2022-10-02 RX ORDER — ACETAMINOPHEN 325 MG/1
650 TABLET ORAL EVERY 6 HOURS PRN
Status: DISCONTINUED | OUTPATIENT
Start: 2022-10-02 | End: 2022-10-10

## 2022-10-02 RX ORDER — ENOXAPARIN SODIUM 100 MG/ML
40 INJECTION SUBCUTANEOUS DAILY
Status: DISCONTINUED | OUTPATIENT
Start: 2022-10-03 | End: 2022-10-13 | Stop reason: HOSPADM

## 2022-10-02 RX ORDER — SODIUM CHLORIDE 0.9 % (FLUSH) 0.9 %
5-40 SYRINGE (ML) INJECTION EVERY 12 HOURS SCHEDULED
Status: DISCONTINUED | OUTPATIENT
Start: 2022-10-02 | End: 2022-10-13 | Stop reason: HOSPADM

## 2022-10-02 RX ORDER — SODIUM CHLORIDE, SODIUM LACTATE, POTASSIUM CHLORIDE, CALCIUM CHLORIDE 600; 310; 30; 20 MG/100ML; MG/100ML; MG/100ML; MG/100ML
INJECTION, SOLUTION INTRAVENOUS CONTINUOUS
Status: DISCONTINUED | OUTPATIENT
Start: 2022-10-02 | End: 2022-10-06

## 2022-10-02 RX ORDER — ACETAMINOPHEN 650 MG/1
650 SUPPOSITORY RECTAL EVERY 6 HOURS PRN
Status: DISCONTINUED | OUTPATIENT
Start: 2022-10-02 | End: 2022-10-10

## 2022-10-02 RX ORDER — ACETAMINOPHEN 500 MG
1000 TABLET ORAL ONCE
Status: COMPLETED | OUTPATIENT
Start: 2022-10-02 | End: 2022-10-02

## 2022-10-02 RX ORDER — MORPHINE SULFATE 2 MG/ML
2 INJECTION, SOLUTION INTRAMUSCULAR; INTRAVENOUS EVERY 4 HOURS PRN
Status: DISCONTINUED | OUTPATIENT
Start: 2022-10-02 | End: 2022-10-10

## 2022-10-02 RX ORDER — METRONIDAZOLE 500 MG/100ML
500 INJECTION, SOLUTION INTRAVENOUS ONCE
Status: COMPLETED | OUTPATIENT
Start: 2022-10-02 | End: 2022-10-02

## 2022-10-02 RX ADMIN — Medication 1000 MG: at 17:26

## 2022-10-02 RX ADMIN — SODIUM CHLORIDE, POTASSIUM CHLORIDE, SODIUM LACTATE AND CALCIUM CHLORIDE: 600; 310; 30; 20 INJECTION, SOLUTION INTRAVENOUS at 23:49

## 2022-10-02 RX ADMIN — ACETAMINOPHEN 1000 MG: 500 TABLET ORAL at 17:26

## 2022-10-02 RX ADMIN — WATER 1000 MG: 1 INJECTION INTRAMUSCULAR; INTRAVENOUS; SUBCUTANEOUS at 21:03

## 2022-10-02 RX ADMIN — METRONIDAZOLE 500 MG: 500 INJECTION, SOLUTION INTRAVENOUS at 21:28

## 2022-10-02 RX ADMIN — SODIUM CHLORIDE, PRESERVATIVE FREE 10 ML: 5 INJECTION INTRAVENOUS at 23:49

## 2022-10-02 RX ADMIN — SODIUM CHLORIDE 1000 ML: 9 INJECTION, SOLUTION INTRAVENOUS at 17:26

## 2022-10-02 RX ADMIN — IOPAMIDOL 65 ML: 755 INJECTION, SOLUTION INTRAVENOUS at 18:48

## 2022-10-02 ASSESSMENT — PAIN - FUNCTIONAL ASSESSMENT: PAIN_FUNCTIONAL_ASSESSMENT: 0-10

## 2022-10-02 ASSESSMENT — PAIN SCALES - GENERAL
PAINLEVEL_OUTOF10: 3
PAINLEVEL_OUTOF10: 0

## 2022-10-02 NOTE — ED PROVIDER NOTES
HPI:  10/2/22,   Time: 5:09 PM EDT       Natalie Puga is a 78 y.o. male presenting to the ED for fever/left flank and back pain, beginning 1 day ago. The complaint has been persistent, moderate in severity, and worsened by nothing. Hx recurrent diverticulitis and colo vesical fistula. Supposed to have surgery. Fever to 102 and left flank pain. Nausea w/o emesis. No diarrhea. No urinary sx. No cough/congestion/runny nose/sore throat. Mild improvement with apap    Review of Systems:   Pertinent positives and negatives are stated within HPI, all other systems reviewed and are negative.          --------------------------------------------- PAST HISTORY ---------------------------------------------  Past Medical History:  has a past medical history of Arthritis, Benign prostatic hyperplasia, Diverticulosis, Erectile dysfunction, GERD (gastroesophageal reflux disease), Heart murmur, History of prostate cancer, History of stroke, Hypercholesterolemia, Hyperlipidemia, and MVP (mitral valve prolapse). Past Surgical History:  has a past surgical history that includes lymph node biopsy (1965); hernia repair (1962); Breast surgery (2011); Colonoscopy (2012); Prostatectomy ([id number]); bladder suspension; Knee arthroscopy (Left); arthrodesis (Left, 1/11/2019); and Upper gastrointestinal endoscopy (2016). Social History:  reports that he has never smoked. He has never used smokeless tobacco. He reports current alcohol use. He reports that he does not use drugs. Family History: family history includes Cancer in his mother; Dementia in his father; Heart Disease in his father. The patients home medications have been reviewed.     Allergies: Ciprofloxacin, Penicillins, and Bactrim [sulfamethoxazole-trimethoprim]        ---------------------------------------------------PHYSICAL EXAM--------------------------------------    Constitutional/General: Alert and oriented x3, well appearing, non toxic in NAD  Head: Normocephalic and atraumatic  Eyes: PERRL, EOMI, conjunctive normal, sclera non icteric  Mouth: Oropharynx clear, handling secretions, no trismus, no asymmetry of the posterior oropharynx or uvular edema  Neck: Supple, full ROM, ns  Respiratory:  Not in respiratory distress  Cardiovascular:  Regular rate. Regular rhythm. No murmurs, gallops, or rubs. 2+ distal pulses  Chest: No chest wall tenderness  GI:  Abdomen Soft, Non tender, Non distended. Left cvat. Musculoskeletal: Moves all extremities x 4. Warm and well perfused,   Integument: skin warm and dry. No rashes. Lymphatic: no lymphadenopathy noted  Neurologic: GCS 15, no focal deficits,   Psychiatric: Normal Affect    -------------------------------------------------- RESULTS -------------------------------------------------  I have personally reviewed all laboratory and imaging results for this patient. Results are listed below.      LABS:  Results for orders placed or performed during the hospital encounter of 10/02/22   CBC with Auto Differential   Result Value Ref Range    WBC 11.5 4.5 - 11.5 E9/L    RBC 4.06 3.80 - 5.80 E12/L    Hemoglobin 13.3 12.5 - 16.5 g/dL    Hematocrit 39.6 37.0 - 54.0 %    MCV 97.5 80.0 - 99.9 fL    MCH 32.8 26.0 - 35.0 pg    MCHC 33.6 32.0 - 34.5 %    RDW 12.1 11.5 - 15.0 fL    Platelets 137 462 - 295 E9/L    MPV 9.7 7.0 - 12.0 fL    Neutrophils % 82.4 (H) 43.0 - 80.0 %    Immature Granulocytes % 0.4 0.0 - 5.0 %    Lymphocytes % 5.9 (L) 20.0 - 42.0 %    Monocytes % 10.4 2.0 - 12.0 %    Eosinophils % 0.6 0.0 - 6.0 %    Basophils % 0.3 0.0 - 2.0 %    Neutrophils Absolute 9.44 (H) 1.80 - 7.30 E9/L    Immature Granulocytes # 0.05 E9/L    Lymphocytes Absolute 0.67 (L) 1.50 - 4.00 E9/L    Monocytes Absolute 1.19 (H) 0.10 - 0.95 E9/L    Eosinophils Absolute 0.07 0.05 - 0.50 E9/L    Basophils Absolute 0.03 0.00 - 0.20 E9/L   Comprehensive Metabolic Panel w/ Reflex to MG   Result Value Ref Range    Sodium 136 132 - 146 mmol/L Potassium reflex Magnesium 4.1 3.5 - 5.0 mmol/L    Chloride 102 98 - 107 mmol/L    CO2 25 22 - 29 mmol/L    Anion Gap 9 7 - 16 mmol/L    Glucose 113 (H) 74 - 99 mg/dL    BUN 16 6 - 23 mg/dL    Creatinine 0.9 0.7 - 1.2 mg/dL    GFR Non-African American >60 >=60 mL/min/1.73    GFR African American >60     Calcium 8.8 8.6 - 10.2 mg/dL    Total Protein 6.4 6.4 - 8.3 g/dL    Albumin 4.0 3.5 - 5.2 g/dL    Total Bilirubin 0.5 0.0 - 1.2 mg/dL    Alkaline Phosphatase 54 40 - 129 U/L    ALT 17 0 - 40 U/L    AST 18 0 - 39 U/L   Urinalysis   Result Value Ref Range    Color, UA Yellow Straw/Yellow    Clarity, UA Clear Clear    Glucose, Ur Negative Negative mg/dL    Bilirubin Urine Negative Negative    Ketones, Urine Negative Negative mg/dL    Specific Gravity, UA 1.025 1.005 - 1.030    Blood, Urine LARGE (A) Negative    pH, UA 6.0 5.0 - 9.0    Protein,  (A) Negative mg/dL    Urobilinogen, Urine 0.2 <2.0 E.U./dL    Nitrite, Urine Negative Negative    Leukocyte Esterase, Urine LARGE (A) Negative   Lactate, Sepsis   Result Value Ref Range    Lactic Acid, Sepsis 0.9 0.5 - 1.9 mmol/L   Microscopic Urinalysis   Result Value Ref Range    WBC, UA >20 (A) 0 - 5 /HPF    RBC, UA 2-5 0 - 2 /HPF    Bacteria, UA MANY (A) None Seen /HPF       RADIOLOGY:  Interpreted by Radiologist.  CT ABDOMEN PELVIS W IV CONTRAST Additional Contrast? None   Final Result   Diverticulosis of colon with focal thickening of the sigmoid colon which may   be due to uncomplicated diverticulitis. Colonoscopy may be considered when   feasible to exclude underlying malignancy. Colovesical fistula with significantly thickened and inflamed urinary bladder   concerning for cystitis with possibly  extending inflammation  to involve the   left ureter extending to left renal pelvis resulting in ureteritis and   possibly pyelonephritis. Please correlate with urinalysis. XR CHEST PORTABLE   Final Result   No acute process.              EKG:  This EKG is signed and interpreted by the EP. Time:   Rate:   Rhythm:   Interpretation:   Comparison:       ------------------------- NURSING NOTES AND VITALS REVIEWED ---------------------------   The nursing notes within the ED encounter and vital signs as below have been reviewed by myself. /60   Pulse 59   Temp 98.4 °F (36.9 °C) (Oral)   Resp 16   Ht 5' 4\" (1.626 m)   Wt 156 lb (70.8 kg)   SpO2 95%   BMI 26.78 kg/m²   Oxygen Saturation Interpretation: Normal    The patients available past medical records and past encounters were reviewed.         ------------------------------ ED COURSE/MEDICAL DECISION MAKING----------------------  Medications   morphine (PF) injection 2 mg (has no administration in time range)   cefTRIAXone (ROCEPHIN) 2,000 mg in sterile water 20 mL IV syringe (has no administration in time range)   metronidazole (FLAGYL) 500 mg in 0.9% NaCl 100 mL IVPB premix (has no administration in time range)   lactated ringers infusion ( IntraVENous New Bag 10/2/22 2349)   sodium chloride flush 0.9 % injection 5-40 mL (10 mLs IntraVENous Given 10/2/22 2349)   sodium chloride flush 0.9 % injection 5-40 mL (has no administration in time range)   0.9 % sodium chloride infusion (has no administration in time range)   enoxaparin (LOVENOX) injection 40 mg (has no administration in time range)   ondansetron (ZOFRAN-ODT) disintegrating tablet 4 mg (has no administration in time range)     Or   ondansetron (ZOFRAN) injection 4 mg (has no administration in time range)   polyethylene glycol (GLYCOLAX) packet 17 g (has no administration in time range)   acetaminophen (TYLENOL) tablet 650 mg (has no administration in time range)     Or   acetaminophen (TYLENOL) suppository 650 mg (has no administration in time range)   0.9 % sodium chloride bolus (0 mLs IntraVENous Stopped 10/2/22 2987)   acetaminophen (TYLENOL) tablet 1,000 mg (1,000 mg Oral Given 10/2/22 1726)   iopamidol (ISOVUE-370) 76 % injection 65 mL (65 mLs IntraVENous Given 10/2/22 1848)   cefTRIAXone (ROCEPHIN) 1,000 mg in sterile water 10 mL IV syringe (1,000 mg IntraVENous Given 10/2/22 2103)   metronidazole (FLAGYL) 500 mg in 0.9% NaCl 100 mL IVPB premix (0 mg IntraVENous Stopped 10/2/22 1809)         ED COURSE:       Medical Decision Making:    Non toxic, results noted, with fistula hx admit for iv abx      This patient's ED course included: a personal history and physicial examination    This patient has remained hemodynamically stable during their ED course. Re-Evaluations:             Re-evaluation. Patients symptoms are improving    Re-examination  10/2/22   715PM EDT      Consultations:             Hospitalist  surgery    Critical Care:         Counseling: The emergency provider has spoken with the patient and spouse/SO and discussed todays results, in addition to providing specific details for the plan of care and counseling regarding the diagnosis and prognosis. Questions are answered at this time and they are agreeable with the plan.       --------------------------------- IMPRESSION AND DISPOSITION ---------------------------------    IMPRESSION  1. Diverticulitis of colon    2. Acute cystitis without hematuria        DISPOSITION  Disposition: Admit to med/surg floor  Patient condition is stable    NOTE: This report was transcribed using voice recognition software.  Every effort was made to ensure accuracy; however, inadvertent computerized transcription errors may be present        Andrea Baptiste MD  10/03/22 0017

## 2022-10-03 PROBLEM — N32.1 COLOVESICAL FISTULA: Status: ACTIVE | Noted: 2022-10-03

## 2022-10-03 PROBLEM — N12 PYELONEPHRITIS: Status: ACTIVE | Noted: 2022-10-03

## 2022-10-03 PROBLEM — N30.00 ACUTE CYSTITIS WITHOUT HEMATURIA: Status: ACTIVE | Noted: 2022-10-03

## 2022-10-03 LAB
ALBUMIN SERPL-MCNC: 3.7 G/DL (ref 3.5–5.2)
ALP BLD-CCNC: 58 U/L (ref 40–129)
ALT SERPL-CCNC: 15 U/L (ref 0–40)
ANION GAP SERPL CALCULATED.3IONS-SCNC: 5 MMOL/L (ref 7–16)
AST SERPL-CCNC: 16 U/L (ref 0–39)
BASOPHILS ABSOLUTE: 0.02 E9/L (ref 0–0.2)
BASOPHILS RELATIVE PERCENT: 0.2 % (ref 0–2)
BILIRUB SERPL-MCNC: 0.8 MG/DL (ref 0–1.2)
BUN BLDV-MCNC: 15 MG/DL (ref 6–23)
CALCIUM SERPL-MCNC: 9.1 MG/DL (ref 8.6–10.2)
CHLORIDE BLD-SCNC: 106 MMOL/L (ref 98–107)
CO2: 25 MMOL/L (ref 22–29)
CREAT SERPL-MCNC: 0.9 MG/DL (ref 0.7–1.2)
EOSINOPHILS ABSOLUTE: 0.01 E9/L (ref 0.05–0.5)
EOSINOPHILS RELATIVE PERCENT: 0.1 % (ref 0–6)
GFR AFRICAN AMERICAN: >60
GFR NON-AFRICAN AMERICAN: >60 ML/MIN/1.73
GLUCOSE BLD-MCNC: 131 MG/DL (ref 74–99)
HCT VFR BLD CALC: 39.2 % (ref 37–54)
HEMOGLOBIN: 13.1 G/DL (ref 12.5–16.5)
IMMATURE GRANULOCYTES #: 0.04 E9/L
IMMATURE GRANULOCYTES %: 0.4 % (ref 0–5)
LYMPHOCYTES ABSOLUTE: 0.72 E9/L (ref 1.5–4)
LYMPHOCYTES RELATIVE PERCENT: 6.7 % (ref 20–42)
MCH RBC QN AUTO: 33.1 PG (ref 26–35)
MCHC RBC AUTO-ENTMCNC: 33.4 % (ref 32–34.5)
MCV RBC AUTO: 99 FL (ref 80–99.9)
MONOCYTES ABSOLUTE: 1.24 E9/L (ref 0.1–0.95)
MONOCYTES RELATIVE PERCENT: 11.6 % (ref 2–12)
NEUTROPHILS ABSOLUTE: 8.65 E9/L (ref 1.8–7.3)
NEUTROPHILS RELATIVE PERCENT: 81 % (ref 43–80)
PDW BLD-RTO: 12.5 FL (ref 11.5–15)
PLATELET # BLD: 191 E9/L (ref 130–450)
PMV BLD AUTO: 10.1 FL (ref 7–12)
POTASSIUM REFLEX MAGNESIUM: 4.5 MMOL/L (ref 3.5–5)
RBC # BLD: 3.96 E12/L (ref 3.8–5.8)
SODIUM BLD-SCNC: 136 MMOL/L (ref 132–146)
TOTAL PROTEIN: 6.6 G/DL (ref 6.4–8.3)
WBC # BLD: 10.7 E9/L (ref 4.5–11.5)

## 2022-10-03 PROCEDURE — 36415 COLL VENOUS BLD VENIPUNCTURE: CPT

## 2022-10-03 PROCEDURE — APPSS30 APP SPLIT SHARED TIME 16-30 MINUTES: Performed by: NURSE PRACTITIONER

## 2022-10-03 PROCEDURE — 2500000003 HC RX 250 WO HCPCS: Performed by: INTERNAL MEDICINE

## 2022-10-03 PROCEDURE — 2580000003 HC RX 258: Performed by: INTERNAL MEDICINE

## 2022-10-03 PROCEDURE — 6360000002 HC RX W HCPCS: Performed by: INTERNAL MEDICINE

## 2022-10-03 PROCEDURE — 85025 COMPLETE CBC W/AUTO DIFF WBC: CPT

## 2022-10-03 PROCEDURE — 6370000000 HC RX 637 (ALT 250 FOR IP): Performed by: STUDENT IN AN ORGANIZED HEALTH CARE EDUCATION/TRAINING PROGRAM

## 2022-10-03 PROCEDURE — 1200000000 HC SEMI PRIVATE

## 2022-10-03 PROCEDURE — 6370000000 HC RX 637 (ALT 250 FOR IP): Performed by: INTERNAL MEDICINE

## 2022-10-03 PROCEDURE — 80053 COMPREHEN METABOLIC PANEL: CPT

## 2022-10-03 PROCEDURE — 99232 SBSQ HOSP IP/OBS MODERATE 35: CPT | Performed by: STUDENT IN AN ORGANIZED HEALTH CARE EDUCATION/TRAINING PROGRAM

## 2022-10-03 RX ORDER — METRONIDAZOLE 500 MG/1
500 TABLET ORAL EVERY 8 HOURS SCHEDULED
Status: DISCONTINUED | OUTPATIENT
Start: 2022-10-03 | End: 2022-10-09

## 2022-10-03 RX ADMIN — ENOXAPARIN SODIUM 40 MG: 100 INJECTION SUBCUTANEOUS at 09:59

## 2022-10-03 RX ADMIN — METRONIDAZOLE 500 MG: 500 TABLET ORAL at 15:46

## 2022-10-03 RX ADMIN — SODIUM CHLORIDE, POTASSIUM CHLORIDE, SODIUM LACTATE AND CALCIUM CHLORIDE: 600; 310; 30; 20 INJECTION, SOLUTION INTRAVENOUS at 10:06

## 2022-10-03 RX ADMIN — SODIUM CHLORIDE, POTASSIUM CHLORIDE, SODIUM LACTATE AND CALCIUM CHLORIDE: 600; 310; 30; 20 INJECTION, SOLUTION INTRAVENOUS at 20:27

## 2022-10-03 RX ADMIN — WATER 2000 MG: 1 INJECTION INTRAMUSCULAR; INTRAVENOUS; SUBCUTANEOUS at 09:59

## 2022-10-03 RX ADMIN — METRONIDAZOLE 500 MG: 500 INJECTION, SOLUTION INTRAVENOUS at 05:46

## 2022-10-03 RX ADMIN — METRONIDAZOLE 500 MG: 500 TABLET ORAL at 22:29

## 2022-10-03 RX ADMIN — ACETAMINOPHEN 650 MG: 325 TABLET ORAL at 22:29

## 2022-10-03 RX ADMIN — ACETAMINOPHEN 650 MG: 325 TABLET ORAL at 10:01

## 2022-10-03 NOTE — PROGRESS NOTES
[facility] Progress Note    Admitting Date and Time: 10/2/2022  4:51 PM  Admit Dx: Diverticulitis [K57.92]  Diverticulitis of colon [K57.32]  Acute cystitis without hematuria [N30.00]    Subjective:  Patient is being followed for Diverticulitis [K57.92]  Diverticulitis of colon [K57.32]  Acute cystitis without hematuria [N30.00]     Patient awake and alert-- seen sitting on couch in room in no acute distress  Reporting he feels better  Denies nausea- able to eat lunch without issue  C/o dysuria  Reporting he has been following with urology for several recent UTIs  Had left sided flank pain prior to hospital- feels better  Wife at bedside   Reporting they are anxious to have colo vesical fistula repaired laparoscopically           ROS: denies fever, chills, cp, sob, n/v, HA unless stated above.       cefTRIAXone (ROCEPHIN) IV  2,000 mg IntraVENous Q24H    metroNIDAZOLE  500 mg IntraVENous Q8H    sodium chloride flush  5-40 mL IntraVENous 2 times per day    enoxaparin  40 mg SubCUTAneous Daily     morphine, 2 mg, Q4H PRN  sodium chloride flush, 5-40 mL, PRN  sodium chloride, , PRN  ondansetron, 4 mg, Q8H PRN   Or  ondansetron, 4 mg, Q6H PRN  polyethylene glycol, 17 g, Daily PRN  acetaminophen, 650 mg, Q6H PRN   Or  acetaminophen, 650 mg, Q6H PRN         Objective:    BP (!) 124/55   Pulse 74   Temp 100 °F (37.8 °C) (Oral)   Resp 18   Ht 5' 4\" (1.626 m)   Wt 156 lb (70.8 kg)   SpO2 95%   BMI 26.78 kg/m²   General Appearance: alert and oriented to person, place and time and in no acute distress  Skin: warm and dry  Head: normocephalic and atraumatic  Neck: neck supple and non tender without mass   Pulmonary/Chest: clear to auscultation bilaterally  Cardiovascular: normal rate, normal S1 and S2 and no carotid bruits  Abdomen: soft, non-tender, distended, normal bowel sounds, no masses or organomegaly  Extremities: no cyanosis, no clubbing and no edema  Neurologic: speech normal         Recent Labs     10/02/22  1718 10/03/22  0420    136   K 4.1 4.5    106   CO2 25 25   BUN 16 15   CREATININE 0.9 0.9   GLUCOSE 113* 131*   CALCIUM 8.8 9.1       Recent Labs     10/02/22  1718 10/03/22  0420   WBC 11.5 10.7   RBC 4.06 3.96   HGB 13.3 13.1   HCT 39.6 39.2   MCV 97.5 99.0   MCH 32.8 33.1   MCHC 33.6 33.4   RDW 12.1 12.5    191   MPV 9.7 10.1         Assessment:    Principal Problem:    Diverticulitis  Resolved Problems:    * No resolved hospital problems. *      Plan:  1. Left sided pyelonephritis from colovesical fistula: pt with known colovesical fistula: He has been doing outpt work up for this. He had a colonoscopy 3 days prior to admission. Reporting feeling well after procedure and no issues. However on Am of admission he woke up with nausea, dry heaves, and fever Came to ER. Imaging in ER- CT/ abd revealed diverticulosis with focal thickening of the sigmoid colon which may be uncomplicated diverticulitis. However colovesical fistula with significantly thickened and inflamed urinary bladder concerning for cystitis with possibly extending inflammation to involve the left urteter extending to left renal pelvis- ureteritis- possible pyelonephritis. UA reviewed many bacteria/ large leukocytes. No leukocytosis on labs. T 101 on admission- Started on ceftriaxone. Follow culture. Urology consulted/ surgery/ ID consulted. Pain control/ antiemetics. ID consulted to assist with antibiotics. 2. Possible diverticulitis: Imaging reviewed. Continue abx- ceftriaxone/flagyl. Surgery consulted. Full liquid diet- advance as tolerated     3. HLD: on statin at home    4. H/o CVA: on asa at home    5. GERD       Time spent reviewing chart, clinical exam, discussing case and answering questions with staff/consultants/patient/family = 20 minutes       NOTE: This report was transcribed using voice recognition software.  Every effort was made to ensure accuracy; however, inadvertent computerized transcription errors may be present. Electronically signed by NIGEL Ojeda on 10/3/2022 at 9:24 AM    Addendum: I have personally participated in the history, exam, medical decision making with Keith Mix NP on the date of service and I agree with all of the pertinent clinical information unless otherwise noted. I have also reviewed and agree with the past medical, family, and social history unless otherwise noted. Patient was admitted with left-sided flank pain found to have left-sided pyelonephritis secondary to known colovesical fistula. Infectious diseases, urology, surgery have been consulted. He has been started on IV ceftriaxone and metronidazole. PHYSICAL EXAM:  Vitals:  BP (!) 124/55   Pulse 74   Temp 100 °F (37.8 °C) (Oral)   Resp 18   Ht 5' 4\" (1.626 m)   Wt 156 lb (70.8 kg)   SpO2 95%   BMI 26.78 kg/m²   Gen: awake, alert, NAD  Lungs: clear to auscultation bilaterally no crackles no wheezing. Heart: RRR, no murmur   Abdomen: soft nontender nondistended positive bowel sounds. No left-sided flank pain at the time of this exam.  Extremities: full range of motion no peripheral edema. Impression:  Principal Problem:    Diverticulitis  Resolved Problems:    * No resolved hospital problems. *      My findings/plan include:  Patient presents with left-sided flank pain found to have left-sided pyelonephritis on CT abdomen/pelvis imaging. He also may have some sigmoid diverticulitis. He has been started on IV ceftriaxone and metronidazole. He has a known colovesical fistula. Infectious diseases, urology, general surgery have been consulted for assistance in management. Continue current management. NOTE: This report was transcribed using voice recognition software. Every effort was made to ensure accuracy; however, inadvertent computerized transcription errors may be present.   Electronically signed by Tobin Kussmaul, MD on 10/3/2022 at 3:15 PM

## 2022-10-03 NOTE — H&P
[facility] History and Physical          ASSESSMENT:      Principal Problem:    Diverticulitis  Resolved Problems:    * No resolved hospital problems. *      PLAN:    1. Likely pyelonephritis and ureteritis  Patient with known colovesical fistula. Also recent colonoscopy with bowel insufflation. I do not have access to colonoscopy reports but patient was not started on antibiotics so it is assumed that he did not have diverticulitis at that time. Possibly has diverticulitis now. Patient has fever and UTI. He does not seem to have sepsis as white blood cell count is not elevated. This should be covered with Rocephin, Flagyl also ordered for diverticulitis. Will consult urology and general surgery. Morphine ordered for pain control. Patient otherwise stable at this time. Starting a full liquid diet, can increase diet per general surgery recommendations. Code Status: Full  DVT prophylaxis: lovenox    CHIEF COMPLAINT:  Fever and chills    History of Present Illness:   79-year-old male with history of hyperlipidemia. Patient with recurrent diverticulitis and has a known colovesical fistula. He is currently going outpatient work-up for this and had a colonoscopy 3 days before admission. Patient was feeling well at that time and there were apparently no issues with the procedure. The day of admission patient woke up with chills and had spiked a fever. He then became nauseated with dry heaves and came to the emergency room. Patient with fever in the ER but normal white blood cell count. CT scan shows diverticulosis with focal thickening of the sigmoid colon which may be uncomplicated diverticulitis. However fistula was significantly thickened and inflamed urinary bladder concerning for cystitis and extending inflammation to involve the left ureter and left renal pelvis with possible pyelonephritis. Urinalysis does show greater than 20 white cells.     Review of systems is otherwise negative. Informant(s) for H&P: Patient    REVIEW OF SYSTEMS:  A comprehensive review of systems was negative except for: what is in the HPI    PMH:  Past Medical History:   Diagnosis Date    Arthritis     Benign prostatic hyperplasia     Diverticulosis     Erectile dysfunction     Impotence of organic origin    GERD (gastroesophageal reflux disease)     Heart murmur     History of prostate cancer     prostate- disorder of prostate    History of stroke 2003    l eye    Hypercholesterolemia     Hyperlipidemia     MVP (mitral valve prolapse)        Surgical History:  Past Surgical History:   Procedure Laterality Date    ARTHRODESIS Left 1/11/2019    LEFT FOOT ARTHRODESIS 1ST METATARSOPHALANGEAL JOINT performed by Danny Davis DPM at [facility]  2011    l lump neg    COLONOSCOPY  2012    4/18/2016,9/19/2007,2/14/2011    HERNIA REPAIR  1962    r inguinal hydrocele also    KNEE ARTHROSCOPY Left     LYMPH NODE BIOPSY  1965    neg    PROSTATECTOMY  [id number]    LAP ROBOTIC    UPPER GASTROINTESTINAL ENDOSCOPY  2016       Medications Prior to Admission:    Prior to Admission medications    Medication Sig Start Date End Date Taking?  Authorizing Provider   simvastatin (ZOCOR) 10 MG tablet Take 2 tablets by mouth nightly 12/8/20   Anyi Fish MD   cyclobenzaprine (FLEXERIL) 10 MG tablet Take 1 tablet by mouth nightly as needed for Muscle spasms 12/8/20   Anyi Fish MD   EPINEPHrine (EPIPEN 2-ALTA) 0.3 MG/0.3ML SOAJ injection Inject 0.3 mLs into the muscle once for 1 dose Use as directed for allergic reaction 9/2/20 12/8/20  Delbert Guerrier,    zinc gluconate 50 MG tablet Take 50 mg by mouth daily    Historical Provider, MD   magnesium oxide (MAG-OX) 400 MG tablet Take 400 mg by mouth daily    Historical Provider, MD   Apple Cider Vinegar 500 MG TABS Take by mouth    Historical Provider, MD   Potassium 99 MG TABS Take by mouth    Historical Provider, MD   diphenhydrAMINE (BENADRYL) 25 MG tablet Take 25 mg by mouth daily    Historical Provider, MD   aspirin 81 MG tablet Take 81 mg by mouth daily Ld 1/8/2019 per dr. Jose Teague Provider, MD   Omega-3 Fatty Acids (FISH OIL) 1000 MG CAPS Take 1,000 mg by mouth daily Ld 1/6/2019    Historical Provider, MD   b complex vitamins capsule Take 1 capsule by mouth daily Ld 1/6/2019    Historical Provider, MD       Allergies:    Ciprofloxacin, Penicillins, and Bactrim [sulfamethoxazole-trimethoprim]    Social History:    reports that he has never smoked. He has never used smokeless tobacco. He reports current alcohol use. He reports that he does not use drugs. Family History:   family history includes Cancer in his mother; Dementia in his father; Heart Disease in his father. PHYSICAL EXAM:  Vitals:  /60   Pulse 59   Temp 98.4 °F (36.9 °C) (Oral)   Resp 16   Ht 5' 4\" (1.626 m)   Wt 156 lb (70.8 kg)   SpO2 95%   BMI 26.78 kg/m²   General Appearance: alert and oriented to person, place and time and in no acute distress  Skin: warm and dry  Head: normocephalic and atraumatic  Eyes: pupils equal, round, and reactive to light, extraocular eye movements intact, conjunctivae normal  Neck: neck supple and non tender  Pulmonary/Chest: clear to auscultation bilaterally  Cardiovascular: regular, 3/6 YAMILA RUSB  Abdomen: soft, non-tender, non-distended, no CVAT  Extremities: no LE edema  Neurologic: grossly non-focal    LABS:  Recent Labs     10/02/22  1718      K 4.1      CO2 25   BUN 16   CREATININE 0.9   GLUCOSE 113*   CALCIUM 8.8       Recent Labs     10/02/22  1718   WBC 11.5   RBC 4.06   HGB 13.3   HCT 39.6   MCV 97.5   MCH 32.8   MCHC 33.6   RDW 12.1      MPV 9.7       Radiology:   CT ABDOMEN PELVIS W IV CONTRAST Additional Contrast? None   Final Result   Diverticulosis of colon with focal thickening of the sigmoid colon which may   be due to uncomplicated diverticulitis.   Colonoscopy may be considered when   feasible to exclude underlying malignancy. Colovesical fistula with significantly thickened and inflamed urinary bladder   concerning for cystitis with possibly  extending inflammation  to involve the   left ureter extending to left renal pelvis resulting in ureteritis and   possibly pyelonephritis. Please correlate with urinalysis. XR CHEST PORTABLE   Final Result   No acute process. NOTE: This report was transcribed using voice recognition software. Every effort was made to ensure accuracy; however, inadvertent computerized transcription errors may be present.   Electronically signed by Enma Leblanc MD on 10/2/2022 at 11:01 PM

## 2022-10-03 NOTE — CONSULTS
GENERAL SURGERY  CONSULT NOTE  10/3/2022    Physician Consulted: Dr. Meena Dacosta  Reason for Consult: Diverticulitis with colovesical fistula  Referring Physician: Dr. Mj Hampton     HPI  Anjelica Myles is a 78 y.o. male with history of hyperlipidemia, Prostates CA s/p prostatectomy who presents for evaluation of fever with left sided flank and back pain that started 1 day ago. He admits to fever/chills with fatigue over the same time. Patient does have a history of recurrent diverticulitis and colo vesical fistula. He aslo complains of nausea without episodes of emesis but admits dry heaves. Last bowel movement was Saturday 10/01 which was noted to be diarrhea and liquid in consistency. Patient was started on rocephin/flagyl and admitted to medicine for further work up and management with urology consulted. He states that he has had multiple episodes of diverticulitis most recently 2018 with diverticular abscess. Patient had colonoscopy on Friday 09/30 for evaluation of colo vesicular fistula for possible preoperatively evaluation. Admits to air and feculent material in urine going on for months. Febrile initially to 101 and hemodynamically stable. BMP unremarkable, LFT's unremarkable, WBC normal 11.5. UA positive for UTI. Blood cultures and urine cultures were also obtained. CT of the abdomen and pelvis significantly inflamed and thickened urinary bladder with colovesical fistula. Focal thickening of the sigmoid colon was also noted.        Past Medical History:   Diagnosis Date    Arthritis     Benign prostatic hyperplasia     Diverticulosis     Erectile dysfunction     Impotence of organic origin    GERD (gastroesophageal reflux disease)     Heart murmur     History of prostate cancer     prostate- disorder of prostate    History of stroke 2003    l eye    Hypercholesterolemia     Hyperlipidemia     MVP (mitral valve prolapse)        Past Surgical History:   Procedure Laterality Date    ARTHRODESIS Left 1/11/2019 LEFT FOOT ARTHRODESIS 1ST METATARSOPHALANGEAL JOINT performed by Jesús Gandara DPM at [facility]  2011    l lump neg    COLONOSCOPY  2012    4/18/2016,9/19/2007,2/14/2011    HERNIA REPAIR  1962    r inguinal hydrocele also    KNEE ARTHROSCOPY Left     LYMPH NODE BIOPSY  1965    neg    PROSTATECTOMY  [id number]    LAP ROBOTIC    UPPER GASTROINTESTINAL ENDOSCOPY  2016       Medications Prior to Admission:    Prior to Admission medications    Medication Sig Start Date End Date Taking? Authorizing Provider   simvastatin (ZOCOR) 10 MG tablet Take 2 tablets by mouth nightly 12/8/20   Graham Bazzi MD   EPINEPHrine (EPIPEN 2-ALTA) 0.3 MG/0.3ML SOAJ injection Inject 0.3 mLs into the muscle once for 1 dose Use as directed for allergic reaction 9/2/20 12/8/20  Delbert Guerrier,    magnesium oxide (MAG-OX) 400 MG tablet Take 400 mg by mouth daily    Historical Provider, MD   Apple Cider Vinegar 500 MG TABS Take by mouth daily    Historical Provider, MD   Potassium 99 MG TABS Take by mouth at bedtime    Historical Provider, MD   aspirin 81 MG tablet Take 81 mg by mouth daily Ld 1/8/2019 per dr. Jack Greco Provider, MD   Omega-3 Fatty Acids (FISH OIL) 1000 MG CAPS Take 1,000 mg by mouth daily Ld 1/6/2019    Historical Provider, MD   b complex vitamins capsule Take 1 capsule by mouth daily Ld 1/6/2019    Historical Provider, MD       Allergies   Allergen Reactions    Ciprofloxacin Anaphylaxis    Penicillins Rash    Bactrim [Sulfamethoxazole-Trimethoprim] Other (See Comments)     ?        Family History   Problem Relation Age of Onset    Cancer Mother         breast    Dementia Father     Heart Disease Father        Social History     Tobacco Use    Smoking status: Never    Smokeless tobacco: Never   Vaping Use    Vaping Use: Never used   Substance Use Topics    Alcohol use: Yes     Comment: 2 x month    Drug use: No         Review of Systems Negative unless otherwise noted in HPI      PHYSICAL EXAM:    Vitals:    10/03/22 0421   BP: 114/62   Pulse: 80   Resp: 16   Temp: 99.1 °F (37.3 °C)   SpO2:        General Appearance:  awake, alert, oriented, in no acute distress  Skin:  Skin color, texture, turgor normal. No rashes or lesions. Head/face:  NCAT  Eyes:  No gross abnormalities. , PERRL, EOMI, and Sclera nonicteric  Lungs:  Normal expansion. Clear to auscultation. No rales, rhonchi, or wheezing. Heart:  Heart regular rate and rhythm  Abdomen:  Soft, normal bowel sounds. No bruits, organomegaly or masses. Mild left lower quadrant tenderness and left flank tenderness to percussion. Extremities: pulses present in all extremities    LABS:    CBC  Recent Labs     10/02/22  1718   WBC 11.5   HGB 13.3   HCT 39.6        BMP  Recent Labs     10/02/22  1718      K 4.1      CO2 25   BUN 16   CREATININE 0.9   CALCIUM 8.8     Liver Function  Recent Labs     10/02/22  1718   BILITOT 0.5   AST 18   ALT 17   ALKPHOS 54   PROT 6.4   LABALBU 4.0     No results for input(s): LACTATE in the last 72 hours. No results for input(s): INR, PTT in the last 72 hours. Invalid input(s): PT    RADIOLOGY    CT ABDOMEN PELVIS W IV CONTRAST Additional Contrast? None    Result Date: 10/2/2022  EXAMINATION: CT OF THE ABDOMEN AND PELVIS WITH CONTRAST 10/2/2022 6:50 pm TECHNIQUE: CT of the abdomen and pelvis was performed with the administration of intravenous contrast. Multiplanar reformatted images are provided for review. Automated exposure control, iterative reconstruction, and/or weight based adjustment of the mA/kV was utilized to reduce the radiation dose to as low as reasonably achievable.  COMPARISON: April 8, 2018 HISTORY: ORDERING SYSTEM PROVIDED HISTORY: left flank pain, known fistula TECHNOLOGIST PROVIDED HISTORY: Additional Contrast?->None Reason for exam:->left flank pain, known fistula Decision Support Exception - unselect if not a suspected or confirmed emergency medical condition->Emergency Medical Condition (MA) FINDINGS: The lung bases demonstrate atelectasis. Liver is normal.  Gallbladder is partially distended with gallstones. Spleen, pancreas, the adrenals and the right kidney are normal.  There is hydronephrosis and edema in the left kidney with distended and thickened ureter with inflammatory changes surrounding the renal hilum and ureter. A 4 cm cystic lesion in the left kidney is noted. Degenerative changes are identified in the lumbar spine. Pelvis. Bladder is collapsed with the wall thickening. There is diverticulosis of the colon with focal thickening of the sigmoid colon. A fistula is noted between the sigmoid colon in the bladder which may be patent. There is constipation with retained fecal matter in the colon. The appendix is normal.     Diverticulosis of colon with focal thickening of the sigmoid colon which may be due to uncomplicated diverticulitis. Colonoscopy may be considered when feasible to exclude underlying malignancy. Colovesical fistula with significantly thickened and inflamed urinary bladder concerning for cystitis with possibly  extending inflammation  to involve the left ureter extending to left renal pelvis resulting in ureteritis and possibly pyelonephritis. Please correlate with urinalysis. XR CHEST PORTABLE    Result Date: 10/2/2022  EXAMINATION: ONE XRAY VIEW OF THE CHEST 10/2/2022 5:22 pm COMPARISON: Prior study dated 08/28/2013 HISTORY: ORDERING SYSTEM PROVIDED HISTORY: ro sepsis/fever TECHNOLOGIST PROVIDED HISTORY: Reason for exam:->ro sepsis/fever FINDINGS: The lungs are without acute focal process. There is no effusion or pneumothorax. The cardiomediastinal silhouette is without acute process. The osseous structures are without acute process. There is diminished inspiratory effort. No acute process.          ASSESSMENT:  78 y.o. male with history of colovesical fistula now admitted with likely UTI with flank/abdominal pain    PLAN:  -Continue Rocephin/Flagyl.  -Continue as needed pain control  -Continue to monitor abdominal exam  -Continue liquid diet which patient is currently tolerating  - Patient's current symptoms are likely related to urosepsis. Consult ID and follow for recommendations regarding antibiotic coverage.  -Discussed plan with Dr. Acevedo Earlaron     Electronically signed by Mahnaz Hood DO on 10/3/22 at 4:50 AM EDT    ATTENDING PHYSICIAN PROGRESS NOTE      I have examined the patient, reviewed the record, and discussed the case with the Resident. I have reviewed all relevant labs and imaging data. Please refer to the resident's note. I agree with the assessment and plan with the following corrections/ additions. The following summarizes my clinical findings and independent assessment.      Faheem Purvis MD

## 2022-10-03 NOTE — CONSULTS
[address] [place] [address] [facility]    Consultation Note     Admit Date: 10/2/2022  4:51 PM    Reason for Consult:   urosepsis    Attending Physician:  Sandor Mckeon, *     Chief Complaint: fever and left sided flank pain. HISTORY OF PRESENT ILLNESS:   The patient is a 78 y.o.  male known to the Infectious Diseases service. The patient has hx of prostate cancer s/p prostatectomy, presented with fever for one day, left sided flank/back pain. He had gas in the urine for almost a month was treated by PCP for UTI . He was sent to urology and underwent cystoscopy on sep 9th. He was diagnosed with colovesical fistula. He was given antibiotics which he does not remember which ones. His symptoms of burning urination got better with it but it comes back off antibiotics. He started spiking fevers yesterday so he came to the hospital.     Since admission, pt is febrile to t max o 101, HS stable,. Saturating well on RA. Cbc was normal except lymphopenia, Cr/Bun was normal. Lfts were normal. Blood cx and urine cx inprocess. CT abdomen showed   Diverticulosis of colon with focal thickening of the sigmoid colon which may   be due to uncomplicated diverticulitis. Colonoscopy may be considered when   feasible to exclude underlying malignancy. Colovesical fistula with significantly thickened and inflamed urinary bladder   concerning for cystitis with possibly  extending inflammation  to involve the   left ureter extending to left renal pelvis resulting in ureteritis and   possibly pyelonephritis. Please correlate with urinalysis. Cxr was clear. Pt is on ceftriaxone/flagyl and I got consulted for further recommendations. Past Medical History:      4/2018: patient was treated for diverticulitis with abscess with IV meropenem.         Diagnosis Date    Arthritis     Benign prostatic hyperplasia     Diverticulosis     Erectile dysfunction     Impotence of organic origin    GERD (gastroesophageal reflux disease)     Heart murmur     History of prostate cancer     prostate- disorder of prostate    History of stroke 2003    l eye    Hypercholesterolemia     Hyperlipidemia     MVP (mitral valve prolapse)      Past Surgical History:        Procedure Laterality Date    ARTHRODESIS Left 1/11/2019    LEFT FOOT ARTHRODESIS 1ST METATARSOPHALANGEAL JOINT performed by Hanane Cruz DPM at [facility]  2011    l lump neg    COLONOSCOPY  2012    4/18/2016,9/19/2007,2/14/2011    HERNIA REPAIR  1962    r inguinal hydrocele also    KNEE ARTHROSCOPY Left     LYMPH NODE BIOPSY  1965    neg    PROSTATECTOMY  [id number]    LAP ROBOTIC    UPPER GASTROINTESTINAL ENDOSCOPY  2016     Current Medications:   Scheduled Meds:   cefTRIAXone (ROCEPHIN) IV  2,000 mg IntraVENous Q24H    metroNIDAZOLE  500 mg IntraVENous Q8H    sodium chloride flush  5-40 mL IntraVENous 2 times per day    enoxaparin  40 mg SubCUTAneous Daily     Continuous Infusions:   lactated ringers 100 mL/hr at 10/03/22 1006    sodium chloride       PRN Meds:morphine, sodium chloride flush, sodium chloride, ondansetron **OR** ondansetron, polyethylene glycol, acetaminophen **OR** acetaminophen    Allergies:  Ciprofloxacin, Penicillins, and Bactrim [sulfamethoxazole-trimethoprim]    Social History:   Social History     Socioeconomic History    Marital status:      Spouse name: Stacie Ramos    Number of children: 2    Years of education: 12    Highest education level: High school graduate   Tobacco Use    Smoking status: Never    Smokeless tobacco: Never   Vaping Use    Vaping Use: Never used   Substance and Sexual Activity    Alcohol use: Yes     Comment: 2 x month    Drug use: No       Family History:       Problem Relation Age of Onset    Cancer Mother         breast    Dementia Father     Heart Disease Father    . Otherwise non-pertinent to the chief complaint.     REVIEW OF SYSTEMS:    As mentioned in HPI, all other systems negative. PHYSICAL EXAM:    Vitals:    BP (!) 124/55   Pulse 74   Temp 100 °F (37.8 °C) (Oral)   Resp 18   Ht 5' 4\" (1.626 m)   Wt 156 lb (70.8 kg)   SpO2 95%   BMI 26.78 kg/m²   Constitutional: The patient is awake, alert, and oriented. Skin: Warm and dry. No rashes were noted. No jaundice. HEENT: Eyes show round, and reactive pupils. Moist mucous membranes, no ulcerations, no thrush. Neck: Supple to movements. No lymphadenopathy. Chest: No use of accessory muscles to breathe. Symmetrical expansion. Auscultation reveals no wheezing, crackles, or rhonchi. Cardiovascular: S1 and S2 are rhythmic and regular. No murmurs appreciated. Abdomen: soft, non tender. Extremities: No clubbing, no cyanosis, no edema.   Musculoskeletal: no gross abnormalities  Neurological: alert, oriented x 3  Lines: peripheral      CBC+dif:  Recent Labs     10/02/22  1718 10/03/22  0420   WBC 11.5 10.7   HGB 13.3 13.1   HCT 39.6 39.2   MCV 97.5 99.0    191   NEUTROABS 9.44* 8.65*     Lab Results   Component Value Date    CRP <0.1 04/23/2018     No results found for: CRPHS  Lab Results   Component Value Date    SEDRATE 4 09/17/2020    SEDRATE 24 (H) 04/23/2018     Lab Results   Component Value Date    ALT 15 10/03/2022    AST 16 10/03/2022    ALKPHOS 58 10/03/2022    BILITOT 0.8 10/03/2022     Lab Results   Component Value Date/Time     10/03/2022 04:20 AM    K 4.5 10/03/2022 04:20 AM     10/03/2022 04:20 AM    CO2 25 10/03/2022 04:20 AM    BUN 15 10/03/2022 04:20 AM    CREATININE 0.9 10/03/2022 04:20 AM    GFRAA >60 10/03/2022 04:20 AM    LABGLOM >60 10/03/2022 04:20 AM    GLUCOSE 131 10/03/2022 04:20 AM    PROT 6.6 10/03/2022 04:20 AM    LABALBU 3.7 10/03/2022 04:20 AM    CALCIUM 9.1 10/03/2022 04:20 AM    BILITOT 0.8 10/03/2022 04:20 AM    ALKPHOS 58 10/03/2022 04:20 AM    AST 16 10/03/2022 04:20 AM    ALT 15 10/03/2022 04:20 AM       Lab Results   Component Value Date/Time    PROTIME 16.0 04/09/2018 09:20 AM    INR 1.4 04/09/2018 09:20 AM       Lab Results   Component Value Date/Time    TSH 1.410 06/10/2019 08:22 AM       Lab Results   Component Value Date/Time    COLORU Yellow 10/02/2022 05:18 PM    PHUR 6.0 10/02/2022 05:18 PM    WBCUA >20 10/02/2022 05:18 PM    RBCUA 2-5 10/02/2022 05:18 PM    BACTERIA MANY 10/02/2022 05:18 PM    CLARITYU Clear 10/02/2022 05:18 PM    SPECGRAV 1.025 10/02/2022 05:18 PM    LEUKOCYTESUR LARGE 10/02/2022 05:18 PM    UROBILINOGEN 0.2 10/02/2022 05:18 PM    BILIRUBINUR Negative 10/02/2022 05:18 PM    BLOODU LARGE 10/02/2022 05:18 PM    GLUCOSEU Negative 10/02/2022 05:18 PM       No results found for: RFI2YOL, BEART, M9YLYNRY, PHART, THGBART, WLD2SVJ, PO2ART, ZYF0NTC  Radiology:  CT ABDOMEN PELVIS W IV CONTRAST Additional Contrast? None   Final Result   Diverticulosis of colon with focal thickening of the sigmoid colon which may   be due to uncomplicated diverticulitis. Colonoscopy may be considered when   feasible to exclude underlying malignancy. Colovesical fistula with significantly thickened and inflamed urinary bladder   concerning for cystitis with possibly  extending inflammation  to involve the   left ureter extending to left renal pelvis resulting in ureteritis and   possibly pyelonephritis. Please correlate with urinalysis. XR CHEST PORTABLE   Final Result   No acute process. Microbiology:  UA: Pyuria  Urine cx: in process   Blood cx in process    Assessment:  Left sided pyelonephritis from colovesical fistula:   Hx of prior diverticulitis:   Multiple antibiotic allergies: he had rash to penicillin, bactrim and cipro,     Plan:    Cont ceftriaxone 2gr IV daily, flagyl switched to PO   Will follow cx and adjust antibiotics. He will need colovesical fistula repaired: discussed with surgery: planning soon. Will follow with you    Thank you for having us see this patient in consultation.  I will be discussing this case with the treating physicians.     Electronically signed by Arina Grove MD on 10/3/2022 at 12:42 PM

## 2022-10-04 LAB
ANION GAP SERPL CALCULATED.3IONS-SCNC: 7 MMOL/L (ref 7–16)
BASOPHILS ABSOLUTE: 0.02 E9/L (ref 0–0.2)
BASOPHILS RELATIVE PERCENT: 0.2 % (ref 0–2)
BUN BLDV-MCNC: 14 MG/DL (ref 6–23)
CALCIUM SERPL-MCNC: 8.7 MG/DL (ref 8.6–10.2)
CHLORIDE BLD-SCNC: 102 MMOL/L (ref 98–107)
CO2: 24 MMOL/L (ref 22–29)
CREAT SERPL-MCNC: 0.9 MG/DL (ref 0.7–1.2)
EOSINOPHILS ABSOLUTE: 0.03 E9/L (ref 0.05–0.5)
EOSINOPHILS RELATIVE PERCENT: 0.3 % (ref 0–6)
GFR AFRICAN AMERICAN: >60
GFR NON-AFRICAN AMERICAN: >60 ML/MIN/1.73
GLUCOSE BLD-MCNC: 180 MG/DL (ref 74–99)
HCT VFR BLD CALC: 35.8 % (ref 37–54)
HEMOGLOBIN: 11.9 G/DL (ref 12.5–16.5)
IMMATURE GRANULOCYTES #: 0.03 E9/L
IMMATURE GRANULOCYTES %: 0.3 % (ref 0–5)
LYMPHOCYTES ABSOLUTE: 0.65 E9/L (ref 1.5–4)
LYMPHOCYTES RELATIVE PERCENT: 7.1 % (ref 20–42)
MCH RBC QN AUTO: 32.5 PG (ref 26–35)
MCHC RBC AUTO-ENTMCNC: 33.2 % (ref 32–34.5)
MCV RBC AUTO: 97.8 FL (ref 80–99.9)
MONOCYTES ABSOLUTE: 0.97 E9/L (ref 0.1–0.95)
MONOCYTES RELATIVE PERCENT: 10.6 % (ref 2–12)
NEUTROPHILS ABSOLUTE: 7.42 E9/L (ref 1.8–7.3)
NEUTROPHILS RELATIVE PERCENT: 81.5 % (ref 43–80)
PDW BLD-RTO: 12.5 FL (ref 11.5–15)
PLATELET # BLD: 171 E9/L (ref 130–450)
PMV BLD AUTO: 9.7 FL (ref 7–12)
POTASSIUM REFLEX MAGNESIUM: 4 MMOL/L (ref 3.5–5)
RBC # BLD: 3.66 E12/L (ref 3.8–5.8)
SODIUM BLD-SCNC: 133 MMOL/L (ref 132–146)
WBC # BLD: 9.1 E9/L (ref 4.5–11.5)

## 2022-10-04 PROCEDURE — 6370000000 HC RX 637 (ALT 250 FOR IP): Performed by: STUDENT IN AN ORGANIZED HEALTH CARE EDUCATION/TRAINING PROGRAM

## 2022-10-04 PROCEDURE — 1200000000 HC SEMI PRIVATE

## 2022-10-04 PROCEDURE — 85025 COMPLETE CBC W/AUTO DIFF WBC: CPT

## 2022-10-04 PROCEDURE — APPSS30 APP SPLIT SHARED TIME 16-30 MINUTES: Performed by: NURSE PRACTITIONER

## 2022-10-04 PROCEDURE — 36415 COLL VENOUS BLD VENIPUNCTURE: CPT

## 2022-10-04 PROCEDURE — 6370000000 HC RX 637 (ALT 250 FOR IP): Performed by: INTERNAL MEDICINE

## 2022-10-04 PROCEDURE — 99232 SBSQ HOSP IP/OBS MODERATE 35: CPT | Performed by: STUDENT IN AN ORGANIZED HEALTH CARE EDUCATION/TRAINING PROGRAM

## 2022-10-04 PROCEDURE — 6360000002 HC RX W HCPCS: Performed by: INTERNAL MEDICINE

## 2022-10-04 PROCEDURE — 2580000003 HC RX 258: Performed by: INTERNAL MEDICINE

## 2022-10-04 PROCEDURE — 80048 BASIC METABOLIC PNL TOTAL CA: CPT

## 2022-10-04 RX ADMIN — METRONIDAZOLE 500 MG: 500 TABLET ORAL at 06:15

## 2022-10-04 RX ADMIN — ENOXAPARIN SODIUM 40 MG: 100 INJECTION SUBCUTANEOUS at 08:48

## 2022-10-04 RX ADMIN — ACETAMINOPHEN 650 MG: 325 TABLET ORAL at 08:48

## 2022-10-04 RX ADMIN — WATER 2000 MG: 1 INJECTION INTRAMUSCULAR; INTRAVENOUS; SUBCUTANEOUS at 08:48

## 2022-10-04 RX ADMIN — SODIUM CHLORIDE, POTASSIUM CHLORIDE, SODIUM LACTATE AND CALCIUM CHLORIDE: 600; 310; 30; 20 INJECTION, SOLUTION INTRAVENOUS at 06:59

## 2022-10-04 RX ADMIN — ONDANSETRON 4 MG: 2 INJECTION INTRAMUSCULAR; INTRAVENOUS at 00:38

## 2022-10-04 RX ADMIN — SODIUM CHLORIDE, PRESERVATIVE FREE 10 ML: 5 INJECTION INTRAVENOUS at 20:13

## 2022-10-04 RX ADMIN — METRONIDAZOLE 500 MG: 500 TABLET ORAL at 14:29

## 2022-10-04 RX ADMIN — ONDANSETRON 4 MG: 2 INJECTION INTRAMUSCULAR; INTRAVENOUS at 20:04

## 2022-10-04 RX ADMIN — ACETAMINOPHEN 650 MG: 325 TABLET ORAL at 20:12

## 2022-10-04 RX ADMIN — METRONIDAZOLE 500 MG: 500 TABLET ORAL at 21:37

## 2022-10-04 NOTE — CARE COORDINATION
Met with patient about diagnosis and discharge plan of care. Pt admit for pyelonephritis, hx of diverticulitis. Iv antibiotics and po flagyl. Cultures pending. Pt lives with spouse in 2 story home. No DME. PCP is Dr Crystal Pantoja. Plan is home. Will continue to follow-mjo    The Plan for Transition of Care is related to the following treatment goals: home     The Patient and/or patient representative pt was provided with a choice of provider and agrees   with the discharge plan. [x] Yes [] No    Freedom of choice list was provided with basic dialogue that supports the patient's individualized plan of care/goals, treatment preferences and shares the quality data associated with the providers.  [x] Yes [] No

## 2022-10-04 NOTE — PROGRESS NOTES
[facility] Progress Note    Admitting Date and Time: 10/2/2022  4:51 PM  Admit Dx: Diverticulitis [K57.92]  Diverticulitis of colon [K57.32]  Acute cystitis without hematuria [N30.00]    Subjective:  Patient is being followed for Diverticulitis [K57.92]  Diverticulitis of colon [K57.32]  Acute cystitis without hematuria [N30.00]     Patient sitting up in chair in room watching TV  Reporting he feels ok now  Did not feel well overnight  Fever 101.8   Ongoing burning with urination         ROS: denies fever, chills, cp, sob, n/v, HA unless stated above.       metroNIDAZOLE  500 mg Oral 3 times per day    cefTRIAXone (ROCEPHIN) IV  2,000 mg IntraVENous Q24H    sodium chloride flush  5-40 mL IntraVENous 2 times per day    enoxaparin  40 mg SubCUTAneous Daily     morphine, 2 mg, Q4H PRN  sodium chloride flush, 5-40 mL, PRN  sodium chloride, , PRN  ondansetron, 4 mg, Q8H PRN   Or  ondansetron, 4 mg, Q6H PRN  polyethylene glycol, 17 g, Daily PRN  acetaminophen, 650 mg, Q6H PRN   Or  acetaminophen, 650 mg, Q6H PRN         Objective:    BP (!) 149/58   Pulse 85   Temp 99.9 °F (37.7 °C) (Oral)   Resp 18   Ht 5' 4\" (1.626 m)   Wt 156 lb (70.8 kg)   SpO2 93%   BMI 26.78 kg/m²   General Appearance: alert and oriented to person, place and time and in no acute distress  Skin: warm and dry  Head: normocephalic and atraumatic  Neck: neck supple and non tender without mass   Pulmonary/Chest: clear to auscultation bilaterally  Cardiovascular: normal rate, normal S1 and S2 and no carotid bruits  Abdomen: soft, non-tender, distended, normal bowel sounds, no masses or organomegaly  Extremities: no cyanosis, no clubbing and no edema  Neurologic: speech normal         Recent Labs     10/02/22  1718 10/03/22  0420    136   K 4.1 4.5    106   CO2 25 25   BUN 16 15   CREATININE 0.9 0.9   GLUCOSE 113* 131*   CALCIUM 8.8 9.1       Recent Labs     10/02/22  1718 10/03/22  0420   WBC 11.5 10.7   RBC 4.06 3.96   HGB 13.3 13.1   HCT 39.6 39.2   MCV 97.5 99.0   MCH 32.8 33.1   MCHC 33.6 33.4   RDW 12.1 12.5    191   MPV 9.7 10.1           Assessment:    Principal Problem:    Diverticulitis  Active Problems:    Acute cystitis without hematuria    Pyelonephritis    Colovesical fistula  Resolved Problems:    * No resolved hospital problems. *      Plan:  1. Left sided pyelonephritis from colovesical fistula: pt with known colovesical fistula: He has been doing outpt work up for this. He had a colonoscopy 3 days prior to admission. Reporting feeling well after procedure and no issues. However on Am of admission he woke up with nausea, dry heaves, and fever came to ER. Imaging in ER- CT/ abd revealed diverticulosis with focal thickening of the sigmoid colon which may be uncomplicated diverticulitis. However colovesical fistula with significantly thickened and inflamed urinary bladder concerning for cystitis with possibly extending inflammation to involve the left urteter extending to left renal pelvis- ureteritis- possible pyelonephritis. UA reviewed many bacteria/ large leukocytes. No leukocytosis on labs. T 101 on admission- Started on ceftriaxone. Follow culture. Urology consulted/ surgery/ ID consulted. Pain control/ antiemetics. ID consulted to assist with antibiotics. Blood cultures 24 h no growth. Fever overnight. Still reporting dysuria. Continue antibiotics. IVF     2. Possible diverticulitis: Imaging reviewed. Continue abx- ceftriaxone/flagyl. Surgery consulted. Diet advanced to general diet and tolerating. 3. HLD: on statin at home     4. H/o CVA: on asa at home     5. GERD      6. Hyperglycemia: check hga1c            Time spent reviewing chart, clinical exam, discussing case and answering questions with staff/consultants/patient/family = 20 minutes             NOTE: This report was transcribed using voice recognition software.  Every effort was made to ensure accuracy; however, inadvertent computerized transcription errors may be present. Electronically signed by NIGEL Herrera on 10/4/2022 at 8:33 AM    Addendum: I have personally participated in the history, exam, medical decision making with Amara García NP on the date of service and I agree with all of the pertinent clinical information unless otherwise noted. I have also reviewed and agree with the past medical, family, and social history unless otherwise noted. Patient was admitted with left-sided pyelonephritis likely secondary to known colovesical fistula. Overnight he spiked a fever to 101.8. Blood cultures obtained on admission have been no growth for 24 hours. He continues on IV ceftriaxone and oral metronidazole. Infectious diseases, urology, general surgery are consulted and following. PHYSICAL EXAM:  Vitals:  BP (!) 149/58   Pulse 85   Temp 99.9 °F (37.7 °C) (Oral)   Resp 18   Ht 5' 4\" (1.626 m)   Wt 156 lb (70.8 kg)   SpO2 93%   BMI 26.78 kg/m²   Gen: awake, alert, NAD  Lungs: clear to auscultation bilaterally no crackles no wheezing. Heart: RRR, no murmur   Abdomen: soft nontender nondistended positive bowel sounds. Extremities: full range of motion no peripheral edema. Impression:  Principal Problem:    Diverticulitis  Active Problems:    Acute cystitis without hematuria    Pyelonephritis    Colovesical fistula  Resolved Problems:    * No resolved hospital problems. *      My findings/plan include:  Patient is admitted with left-sided pyelonephritis likely secondary to known colovesical fistula. He could also have sigmoid diverticulitis based on CT imaging. Infectious diseases, urology, general surgery are consulted and following. He continues on IV ceftriaxone and oral metronidazole. Overnight he spiked a fever to 101.8, repeat blood cultures were not obtained. Blood cultures obtained on admission have shown no growth through 24 hours.   If he spikes another fever repeat blood culture should be obtained. We will continue to follow-up with recommendations by infectious diseases, urology, general surgery. No plans for surgery to correct colovesical fistula inpatient at this time due to current infection. Continue current management. NOTE: This report was transcribed using voice recognition software. Every effort was made to ensure accuracy; however, inadvertent computerized transcription errors may be present.   Electronically signed by Negar Williamson MD on 10/4/2022 at 12:38 PM

## 2022-10-04 NOTE — PROGRESS NOTES
[address] [place] [address] [facility]  Progress Note    SUBJECTIVE:  Chief Complaint   Patient presents with    Abdominal Pain     Has fistula between bowl and bladder    Fever     102.4 for ems      Patient is tolerating medications. No reported adverse drug reactions. No nausea, vomiting, diarrhea. Dysuria is improving, says he had a few large discharges last night however  Tmax 101.8 - with associated sweats and chills     Review of systems:  As stated above in the chief complaint, otherwise negative. Medications:  Scheduled Meds:   metroNIDAZOLE  500 mg Oral 3 times per day    cefTRIAXone (ROCEPHIN) IV  2,000 mg IntraVENous Q24H    sodium chloride flush  5-40 mL IntraVENous 2 times per day    enoxaparin  40 mg SubCUTAneous Daily     Continuous Infusions:   lactated ringers 100 mL/hr at 10/04/22 0659    sodium chloride       PRN Meds:morphine, sodium chloride flush, sodium chloride, ondansetron **OR** ondansetron, polyethylene glycol, acetaminophen **OR** acetaminophen    OBJECTIVE:  BP (!) 149/58   Pulse 85   Temp 99.9 °F (37.7 °C) (Oral)   Resp 18   Ht 5' 4\" (1.626 m)   Wt 156 lb (70.8 kg)   SpO2 93%   BMI 26.78 kg/m²   Temp  Av °F (37.8 °C)  Min: 98.3 °F (36.8 °C)  Max: 101.8 °F (38.8 °C)  Constitutional: The patient is awake, alert, and oriented. Resting in bed, in no distress. Skin: Warm and dry. No rashes were noted. HEENT: Round and reactive pupils. Moist mucous membranes. No ulcerations or thrush. Neck: Supple to movements. Chest: No use of accessory muscles to breathe. Symmetrical expansion. No wheezing, crackles or rhonchi. Cardiovascular: S1 and S2 are rhythmic and regular. No murmurs appreciated. Abdomen: Positive bowel sounds to auscultation. Minimal LLQ tendernes to palpation. No masses felt. No hepatosplenomegaly. Extremities: No clubbing, no cyanosis, no edema.   Lines: peripheral    Laboratory and Tests Review:  Lab Results   Component Value Date WBC 10.7 10/03/2022    WBC 11.5 10/02/2022    WBC 7.3 06/05/2020    HGB 13.1 10/03/2022    HCT 39.2 10/03/2022    MCV 99.0 10/03/2022     10/03/2022     Lab Results   Component Value Date    NEUTROABS 8.65 (H) 10/03/2022    NEUTROABS 9.44 (H) 10/02/2022    NEUTROABS 4.63 06/05/2020     No results found for: CRPHS  Lab Results   Component Value Date    ALT 15 10/03/2022    AST 16 10/03/2022    ALKPHOS 58 10/03/2022    BILITOT 0.8 10/03/2022     Lab Results   Component Value Date/Time     10/03/2022 04:20 AM    K 4.5 10/03/2022 04:20 AM     10/03/2022 04:20 AM    CO2 25 10/03/2022 04:20 AM    BUN 15 10/03/2022 04:20 AM    CREATININE 0.9 10/03/2022 04:20 AM    CREATININE 0.9 10/02/2022 05:18 PM    CREATININE 1.1 09/17/2020 02:47 PM    GFRAA >60 10/03/2022 04:20 AM    LABGLOM >60 10/03/2022 04:20 AM    GLUCOSE 131 10/03/2022 04:20 AM    PROT 6.6 10/03/2022 04:20 AM    LABALBU 3.7 10/03/2022 04:20 AM    CALCIUM 9.1 10/03/2022 04:20 AM    BILITOT 0.8 10/03/2022 04:20 AM    ALKPHOS 58 10/03/2022 04:20 AM    AST 16 10/03/2022 04:20 AM    ALT 15 10/03/2022 04:20 AM     Lab Results   Component Value Date    CRP <0.1 04/23/2018     Lab Results   Component Value Date    SEDRATE 4 09/17/2020    SEDRATE 24 (H) 04/23/2018     Radiology:  Reviewed     Microbiology:   Blood cultures: negative so far  Urine culture: pending     ASSESSMENT:  Left sided pyelonephritis from colovesical fistula:   Hx of prior diverticulitis:   Multiple antibiotic allergies: he had rash to penicillin, bactrim and cipro    PLAN:  Continue Ceftriaxone 2g IV daily  Continue po Flagyl  Monitor temps - if temp spikes above 101 may need to repeat blood cultures again  Check final cultures -- await urine cultures, blood cultures are negative so far   Surgery following: no plans for acute surgical intervention   Monitor labs-- pending for today     ARMINDA Thurston CNP  9:44 AM  10/4/2022     Pt seen and examined.  Above discussed agree with advanced practice nurse. Labs, cultures, and radiographs reviewed. Face to Face encounter occurred. Changes made as necessary. Discussed with surgery:recommended fistula repair later this admission if possible, because he will have UTI as soon as he is off antibiotics. They will check schedule.      Reba Sung MD

## 2022-10-04 NOTE — CONSULTS
10/4/2022 12:39 PM  Allen Mckeon  [id number]     Chief Complaint:    Colovesical fistula      History of Present Illness: The patient is a 78 y.o. male patient who presented to the hospital with complaints of fever and left sided flank pain. He had a CT abdomen pelvis performed that showed left sided pyelonephritis and suspected colovesical fistula. He is being followed by general surgery for colovesical fistula and had colonoscopy last week. Now presents with abdominal pain and fevers. Has had pneumaturia and fecaluria. Currently sitting up in a chair and feeling better in comparison to yesterday.      He is known to Dr. Beata Campbell  He has a history of prostate cancer s/p RALP September 2013  His PSA has remained undetectable   He did see us in the office on 9/9 with complaints of pneumaturia  A cystoscopy was done at that time and colovesical fistula was detected on the left side of the dome of the bladder  He was referred to general surgery at that time    Past Medical History:   Diagnosis Date    Arthritis     Benign prostatic hyperplasia     Diverticulosis     Erectile dysfunction     Impotence of organic origin    GERD (gastroesophageal reflux disease)     Heart murmur     History of prostate cancer     prostate- disorder of prostate    History of stroke 2003    l eye    Hypercholesterolemia     Hyperlipidemia     MVP (mitral valve prolapse)          Past Surgical History:   Procedure Laterality Date    ARTHRODESIS Left 1/11/2019    LEFT FOOT ARTHRODESIS 1ST METATARSOPHALANGEAL JOINT performed by Larisa Membreno DPM at [facility]  2011    l lump neg    COLONOSCOPY  2012 4/18/2016,9/19/2007,2/14/2011    HERNIA REPAIR  1962    r inguinal hydrocele also    KNEE ARTHROSCOPY Left     LYMPH NODE BIOPSY  1965    neg    PROSTATECTOMY  [id number]    LAP ROBOTIC    UPPER GASTROINTESTINAL ENDOSCOPY  2016       Medications Prior to Admission:    Medications Prior to Admission: simvastatin (ZOCOR) 10 MG tablet, Take 2 tablets by mouth nightly  [DISCONTINUED] cyclobenzaprine (FLEXERIL) 10 MG tablet, Take 1 tablet by mouth nightly as needed for Muscle spasms  EPINEPHrine (EPIPEN 2-ALTA) 0.3 MG/0.3ML SOAJ injection, Inject 0.3 mLs into the muscle once for 1 dose Use as directed for allergic reaction  [DISCONTINUED] zinc gluconate 50 MG tablet, Take 50 mg by mouth daily  magnesium oxide (MAG-OX) 400 MG tablet, Take 400 mg by mouth daily  Apple Cider Vinegar 500 MG TABS, Take by mouth daily  Potassium 99 MG TABS, Take by mouth at bedtime  [DISCONTINUED] diphenhydrAMINE (BENADRYL) 25 MG tablet, Take 25 mg by mouth daily  aspirin 81 MG tablet, Take 81 mg by mouth daily Ld 1/8/2019 per dr. Johnson Carry (FISH OIL) 1000 MG CAPS, Take 1,000 mg by mouth daily Ld 1/6/2019  b complex vitamins capsule, Take 1 capsule by mouth daily Ld 1/6/2019    Allergies:    Ciprofloxacin, Penicillins, and Bactrim [sulfamethoxazole-trimethoprim]    Social History:    reports that he has never smoked. He has never used smokeless tobacco. He reports current alcohol use. He reports that he does not use drugs. Family History:   Non-contributory to this Urological problem  family history includes Cancer in his mother; Dementia in his father; Heart Disease in his father.     Review of Systems:  Constitutional: +fevers   Respiratory: negative for cough and hemoptysis  Cardiovascular: negative for chest pain and dyspnea  Gastrointestinal: negative for abdominal pain, diarrhea, nausea and vomiting   Derm: negative for rash and skin lesion(s)  Neurological: negative for seizures and tremors  Musculoskeletal: Negative    Psychiatric: Negative   : As above in the HPI, otherwise negative  All other reviews are negative    Physical Exam:     Vitals:  BP (!) 149/58   Pulse 85   Temp 99.9 °F (37.7 °C) (Oral)   Resp 18   Ht 5' 4\" (1.626 m)   Wt 156 lb (70.8 kg)   SpO2 93%   BMI 26.78 kg/m²     General: Awake, alert, oriented X 3. No apparent distress. HEENT:  Normocephalic, atraumatic. Lungs:  Respirations symmetric and non-labored. Abdomen:  soft, nontender, no masses  Extremities:  No clubbing, cyanosis, or edema  Skin:  Warm and dry, no open lesions or rashes  Neuro: There are no motor or sensory deficits in the 4 quadrant extremities   Rectal: deferred  Genitourinary:  no kowalski     Labs:     Recent Labs     10/02/22  1718 10/03/22  0420 10/04/22  0915   WBC 11.5 10.7 9.1   RBC 4.06 3.96 3.66*   HGB 13.3 13.1 11.9*   HCT 39.6 39.2 35.8*   MCV 97.5 99.0 97.8   MCH 32.8 33.1 32.5   MCHC 33.6 33.4 33.2   RDW 12.1 12.5 12.5    191 171   MPV 9.7 10.1 9.7         Recent Labs     10/02/22  1718 10/03/22  0420 10/04/22  0915   CREATININE 0.9 0.9 0.9       Lab Results   Component Value Date    PSA <0.03 10/28/2021    PSA <0.03 10/22/2020    PSA 0.02 10/03/2019       Imaging:   Narrative   EXAMINATION:   CT OF THE ABDOMEN AND PELVIS WITH CONTRAST 10/2/2022 6:50 pm       TECHNIQUE:   CT of the abdomen and pelvis was performed with the administration of   intravenous contrast. Multiplanar reformatted images are provided for review. Automated exposure control, iterative reconstruction, and/or weight based   adjustment of the mA/kV was utilized to reduce the radiation dose to as low   as reasonably achievable. COMPARISON:   April 8, 2018       HISTORY:   ORDERING SYSTEM PROVIDED HISTORY: left flank pain, known fistula   TECHNOLOGIST PROVIDED HISTORY:   Additional Contrast?->None   Reason for exam:->left flank pain, known fistula   Decision Support Exception - unselect if not a suspected or confirmed   emergency medical condition->Emergency Medical Condition (MA)       FINDINGS:   The lung bases demonstrate atelectasis. Liver is normal.  Gallbladder is   partially distended with gallstones.   Spleen, pancreas, the adrenals and the   right kidney are normal.  There is hydronephrosis and edema in the left kidney with distended and thickened ureter with inflammatory changes   surrounding the renal hilum and ureter. A 4 cm cystic lesion in the left   kidney is noted. Degenerative changes are identified in the lumbar spine. Pelvis. Bladder is collapsed with the wall thickening. There is   diverticulosis of the colon with focal thickening of the sigmoid colon. A   fistula is noted between the sigmoid colon in the bladder which may be   patent. There is constipation with retained fecal matter in the colon. The   appendix is normal.           Impression   Diverticulosis of colon with focal thickening of the sigmoid colon which may   be due to uncomplicated diverticulitis. Colonoscopy may be considered when   feasible to exclude underlying malignancy. Colovesical fistula with significantly thickened and inflamed urinary bladder   concerning for cystitis with possibly  extending inflammation  to involve the   left ureter extending to left renal pelvis resulting in ureteritis and   possibly pyelonephritis. Please correlate with urinalysis. Assessment/plan:  Colovesical fistula   Bilateral Renal Cysts   E. Coli UTI  PCA s/p RALP (September 2013)  Incontinence s/p male sling 2015   Left pyelonephritis     Creatinine stable   Urine culture, +E. Coli, sensitivities pending   Antibiotics per ID  CTAP reviewed  General surgery following , will defer management of fistula to their expertise. He had colonoscopy on 9/30  Check PVR   Hold on acute  interventions currently        Electronically signed by ARMINDA Olivares CNP on 10/4/2022 at 12:39 PM  [facility]     I agree with the assessment and plan of GAMAL Olivares. I personally evaluated the patient and made any changes to reflect my impression and plan. Issue should resolve after surgical correction of fistula.

## 2022-10-04 NOTE — PLAN OF CARE
Problem: Safety - Adult  Goal: Free from fall injury  10/4/2022 0307 by Susie Soni RN  Outcome: Progressing  10/3/2022 2322 by Griselda Gutierrez RN  Outcome: Progressing

## 2022-10-04 NOTE — PROGRESS NOTES
GENERAL SURGERY  DAILY PROGRESS NOTE  10/4/2022    Subjective:  No events overnight. Complaining of burning with urination that is unchanged from prior. Tolerating diet and having BM's. Febrile to 101.8 overnight. Hemodynamically stable. AM labs are currently pending. Objective:  BP (!) 118/49   Pulse 60   Temp 99.4 °F (37.4 °C)   Resp 16   Ht 5' 4\" (1.626 m)   Wt 156 lb (70.8 kg)   SpO2 94%   BMI 26.78 kg/m²     General appearance: alert, cooperative and in no acute distress. Eyes: grossly normal  Lungs: nonlabored breathing on room air   Heart: regular rate  Abdomen:  soft, non distended. Mild tenderness to LLQ and left flank but improved from prior  Skin: No skin abnormalities  Neurologic: Alert and oriented x 3. Grossly normal  Musculoskeletal: No clubbing cyanosis or edema    Assessment/Plan:  78 y.o. male with history of colovesical fistula now with pyelonephritis     - ID evaluated the patient yesterday and recommended cont rocephin and switch flagyl to po with further adjustment to regimen pending final cultures  - blood cultures 24 hour no growth  - Tolerating regular diet yesterday   - no plans for acute surgical intervention currently    Electronically signed by Desi Soriano DO on 10/4/2022 at 4:54 AM      ATTENDING PHYSICIAN PROGRESS NOTE      I have examined the patient, reviewed the record, and discussed the case with the Resident. I have reviewed all relevant labs and imaging data. Please refer to the resident's note. I agree with the assessment and plan with the following corrections/ additions. The following summarizes my clinical findings and independent assessment.      Jorge Miller MD

## 2022-10-05 LAB
ANION GAP SERPL CALCULATED.3IONS-SCNC: 8 MMOL/L (ref 7–16)
BUN BLDV-MCNC: 15 MG/DL (ref 6–23)
CALCIUM SERPL-MCNC: 8.9 MG/DL (ref 8.6–10.2)
CHLORIDE BLD-SCNC: 101 MMOL/L (ref 98–107)
CO2: 26 MMOL/L (ref 22–29)
CREAT SERPL-MCNC: 0.9 MG/DL (ref 0.7–1.2)
GFR AFRICAN AMERICAN: >60
GFR NON-AFRICAN AMERICAN: >60 ML/MIN/1.73
GLUCOSE BLD-MCNC: 119 MG/DL (ref 74–99)
HBA1C MFR BLD: 6.2 % (ref 4–5.6)
HCT VFR BLD CALC: 36.7 % (ref 37–54)
HEMOGLOBIN: 12 G/DL (ref 12.5–16.5)
MCH RBC QN AUTO: 32.3 PG (ref 26–35)
MCHC RBC AUTO-ENTMCNC: 32.7 % (ref 32–34.5)
MCV RBC AUTO: 98.7 FL (ref 80–99.9)
ORGANISM: ABNORMAL
PDW BLD-RTO: 12.5 FL (ref 11.5–15)
PLATELET # BLD: 184 E9/L (ref 130–450)
PMV BLD AUTO: 9.4 FL (ref 7–12)
POTASSIUM SERPL-SCNC: 4 MMOL/L (ref 3.5–5)
RBC # BLD: 3.72 E12/L (ref 3.8–5.8)
SODIUM BLD-SCNC: 135 MMOL/L (ref 132–146)
URINE CULTURE, ROUTINE: ABNORMAL
WBC # BLD: 7.5 E9/L (ref 4.5–11.5)

## 2022-10-05 PROCEDURE — 2580000003 HC RX 258: Performed by: INTERNAL MEDICINE

## 2022-10-05 PROCEDURE — 6370000000 HC RX 637 (ALT 250 FOR IP)

## 2022-10-05 PROCEDURE — 85027 COMPLETE CBC AUTOMATED: CPT

## 2022-10-05 PROCEDURE — 36415 COLL VENOUS BLD VENIPUNCTURE: CPT

## 2022-10-05 PROCEDURE — 1200000000 HC SEMI PRIVATE

## 2022-10-05 PROCEDURE — 87040 BLOOD CULTURE FOR BACTERIA: CPT

## 2022-10-05 PROCEDURE — 6360000002 HC RX W HCPCS: Performed by: INTERNAL MEDICINE

## 2022-10-05 PROCEDURE — 6370000000 HC RX 637 (ALT 250 FOR IP): Performed by: STUDENT IN AN ORGANIZED HEALTH CARE EDUCATION/TRAINING PROGRAM

## 2022-10-05 PROCEDURE — 99232 SBSQ HOSP IP/OBS MODERATE 35: CPT | Performed by: STUDENT IN AN ORGANIZED HEALTH CARE EDUCATION/TRAINING PROGRAM

## 2022-10-05 PROCEDURE — 80048 BASIC METABOLIC PNL TOTAL CA: CPT

## 2022-10-05 PROCEDURE — 6370000000 HC RX 637 (ALT 250 FOR IP): Performed by: INTERNAL MEDICINE

## 2022-10-05 PROCEDURE — APPSS30 APP SPLIT SHARED TIME 16-30 MINUTES: Performed by: NURSE PRACTITIONER

## 2022-10-05 PROCEDURE — 83036 HEMOGLOBIN GLYCOSYLATED A1C: CPT

## 2022-10-05 PROCEDURE — 51798 US URINE CAPACITY MEASURE: CPT

## 2022-10-05 RX ORDER — POLYETHYLENE GLYCOL 3350 17 G/17G
17 POWDER, FOR SOLUTION ORAL ONCE
Status: DISCONTINUED | OUTPATIENT
Start: 2022-10-05 | End: 2022-10-10

## 2022-10-05 RX ADMIN — SODIUM CHLORIDE, POTASSIUM CHLORIDE, SODIUM LACTATE AND CALCIUM CHLORIDE: 600; 310; 30; 20 INJECTION, SOLUTION INTRAVENOUS at 23:53

## 2022-10-05 RX ADMIN — METRONIDAZOLE 500 MG: 500 TABLET ORAL at 21:42

## 2022-10-05 RX ADMIN — ONDANSETRON 4 MG: 2 INJECTION INTRAMUSCULAR; INTRAVENOUS at 21:04

## 2022-10-05 RX ADMIN — ACETAMINOPHEN 650 MG: 325 TABLET ORAL at 21:02

## 2022-10-05 RX ADMIN — WATER 2000 MG: 1 INJECTION INTRAMUSCULAR; INTRAVENOUS; SUBCUTANEOUS at 08:56

## 2022-10-05 RX ADMIN — METRONIDAZOLE 500 MG: 500 TABLET ORAL at 05:47

## 2022-10-05 RX ADMIN — ENOXAPARIN SODIUM 40 MG: 100 INJECTION SUBCUTANEOUS at 08:56

## 2022-10-05 RX ADMIN — SALINE NASAL SPRAY 1 SPRAY: 1.5 SOLUTION NASAL at 01:06

## 2022-10-05 RX ADMIN — SODIUM CHLORIDE, POTASSIUM CHLORIDE, SODIUM LACTATE AND CALCIUM CHLORIDE: 600; 310; 30; 20 INJECTION, SOLUTION INTRAVENOUS at 04:34

## 2022-10-05 RX ADMIN — ACETAMINOPHEN 650 MG: 325 TABLET ORAL at 13:36

## 2022-10-05 RX ADMIN — METRONIDAZOLE 500 MG: 500 TABLET ORAL at 13:36

## 2022-10-05 RX ADMIN — SODIUM CHLORIDE, PRESERVATIVE FREE 10 ML: 5 INJECTION INTRAVENOUS at 21:06

## 2022-10-05 ASSESSMENT — PAIN SCALES - GENERAL
PAINLEVEL_OUTOF10: 0
PAINLEVEL_OUTOF10: 4

## 2022-10-05 NOTE — PROGRESS NOTES
[address] [place] [address] [facility]  [facility]  Progress Note    SUBJECTIVE:  Chief Complaint   Patient presents with    Abdominal Pain     Has fistula between bowl and bladder    Fever     102.4 for ems      Patient is tolerating medications. No reported adverse drug reactions. No nausea, vomiting, diarrhea. Tmax 101.2 last evening with sweats  Feeling ok this morning, up to the chair     Review of systems:  As stated above in the chief complaint, otherwise negative. Medications:  Scheduled Meds:   metroNIDAZOLE  500 mg Oral 3 times per day    cefTRIAXone (ROCEPHIN) IV  2,000 mg IntraVENous Q24H    sodium chloride flush  5-40 mL IntraVENous 2 times per day    enoxaparin  40 mg SubCUTAneous Daily     Continuous Infusions:   lactated ringers 100 mL/hr at 10/05/22 0434    sodium chloride       PRN Meds:sodium chloride, morphine, sodium chloride flush, sodium chloride, ondansetron **OR** ondansetron, polyethylene glycol, acetaminophen **OR** acetaminophen    OBJECTIVE:  BP (!) 152/74   Pulse 90   Temp 98 °F (36.7 °C) (Oral)   Resp 16   Ht 5' 4\" (1.626 m)   Wt 156 lb (70.8 kg)   SpO2 93%   BMI 26.78 kg/m²   Temp  Av.2 °F (37.3 °C)  Min: 98 °F (36.7 °C)  Max: 101.2 °F (38.4 °C)  Constitutional: The patient is awake, alert, and oriented. Up to the chair. In no distress. Skin: Warm and dry. No rashes were noted. HEENT: Round and reactive pupils. Moist mucous membranes. No ulcerations or thrush. Neck: Supple to movements. Chest: No use of accessory muscles to breathe. Symmetrical expansion. No wheezing, crackles or rhonchi. Cardiovascular: S1 and S2 are rhythmic and regular. No murmurs appreciated. Abdomen: Positive bowel sounds to auscultation. Minimal LLQ tendernes to palpation. No masses felt. Extremities: No edema.   Lines: peripheral    Laboratory and Tests Review:  Lab Results   Component Value Date    WBC 7.5 10/05/2022    WBC 9.1 10/04/2022    WBC 10.7 10/03/2022    HGB 12.0 (L) 10/05/2022    HCT 36.7 (L) 10/05/2022    MCV 98.7 10/05/2022     10/05/2022     Lab Results   Component Value Date    NEUTROABS 7.42 (H) 10/04/2022    NEUTROABS 8.65 (H) 10/03/2022    NEUTROABS 9.44 (H) 10/02/2022     No results found for: CRPHS  Lab Results   Component Value Date    ALT 15 10/03/2022    AST 16 10/03/2022    ALKPHOS 58 10/03/2022    BILITOT 0.8 10/03/2022     Lab Results   Component Value Date/Time     10/05/2022 04:28 AM    K 4.0 10/05/2022 04:28 AM    K 4.0 10/04/2022 09:15 AM     10/05/2022 04:28 AM    CO2 26 10/05/2022 04:28 AM    BUN 15 10/05/2022 04:28 AM    CREATININE 0.9 10/05/2022 04:28 AM    CREATININE 0.9 10/04/2022 09:15 AM    CREATININE 0.9 10/03/2022 04:20 AM    GFRAA >60 10/05/2022 04:28 AM    LABGLOM >60 10/05/2022 04:28 AM    GLUCOSE 119 10/05/2022 04:28 AM    PROT 6.6 10/03/2022 04:20 AM    LABALBU 3.7 10/03/2022 04:20 AM    CALCIUM 8.9 10/05/2022 04:28 AM    BILITOT 0.8 10/03/2022 04:20 AM    ALKPHOS 58 10/03/2022 04:20 AM    AST 16 10/03/2022 04:20 AM    ALT 15 10/03/2022 04:20 AM     Lab Results   Component Value Date    CRP <0.1 04/23/2018     Lab Results   Component Value Date    SEDRATE 4 09/17/2020    SEDRATE 24 (H) 04/23/2018     Radiology:  Reviewed     Microbiology:   Blood cultures: negative so far  Urine culture: >100K E.coli (pansensitive)     ASSESSMENT:  Left sided pyelonephritis from colovesical fistula:   Hx of prior diverticulitis:   Multiple antibiotic allergies: he had rash to penicillin, bactrim and cipro    PLAN:  Continue Ceftriaxone 2g IV daily & po Flagyl  Check final cultures -- blood cultures are negative so far   Surgery following: Dr. Mccoy Kanner spoke with - hoping to have fistula repaired this admission  Labs reviewed  We will follow with you    ARMINDA Owens - CNP  9:48 AM  10/5/2022     Pt seen and examined. Above discussed agree with advanced practice nurse. Labs, cultures, and radiographs reviewed.   Face to Face encounter occurred. Changes made as necessary.      Mook Camargo MD

## 2022-10-05 NOTE — PROGRESS NOTES
[facility] Progress Note    Admitting Date and Time: 10/2/2022  4:51 PM  Admit Dx: Diverticulitis [K57.92]  Diverticulitis of colon [K57.32]  Acute cystitis without hematuria [N30.00]    Subjective:  Patient is being followed for Diverticulitis [K57.92]  Diverticulitis of colon [K57.32]  Acute cystitis without hematuria [N30.00]     Patient awake and alert- seen up walking in room  Reporting fever last night- with sweats  Feels ok  C/o feeling constipated  Denies nausea  Tolerating diet   Repeat Blood cultures ordered and sent this am         ROS: denies fever, chills, cp, sob, n/v, HA unless stated above.       metroNIDAZOLE  500 mg Oral 3 times per day    cefTRIAXone (ROCEPHIN) IV  2,000 mg IntraVENous Q24H    sodium chloride flush  5-40 mL IntraVENous 2 times per day    enoxaparin  40 mg SubCUTAneous Daily     sodium chloride, 1 spray, Q4H PRN  morphine, 2 mg, Q4H PRN  sodium chloride flush, 5-40 mL, PRN  sodium chloride, , PRN  ondansetron, 4 mg, Q8H PRN   Or  ondansetron, 4 mg, Q6H PRN  polyethylene glycol, 17 g, Daily PRN  acetaminophen, 650 mg, Q6H PRN   Or  acetaminophen, 650 mg, Q6H PRN         Objective:    BP (!) 152/74   Pulse 90   Temp 98 °F (36.7 °C) (Oral)   Resp 16   Ht 5' 4\" (1.626 m)   Wt 156 lb (70.8 kg)   SpO2 93%   BMI 26.78 kg/m²     General Appearance: alert and oriented to person, place and time and in no acute distress  Skin: warm and dry  Head: normocephalic and atraumatic  Neck: neck supple and non tender without mass   Pulmonary/Chest: clear to auscultation bilaterally  Cardiovascular: normal rate, normal S1 and S2 and no carotid bruits  Abdomen: soft, non-tender, distended, normal bowel sounds, no masses or organomegaly  Extremities: no cyanosis, no clubbing and no edema  Neurologic: speech normal            Recent Labs     10/03/22  0420 10/04/22  0915 10/05/22  0428    133 135   K 4.5 4.0 4.0    102 101   CO2 25 24 26   BUN 15 14 15   CREATININE 0.9 0.9 0.9   GLUCOSE 131* 180* 119*   CALCIUM 9.1 8.7 8.9       Recent Labs     10/03/22  0420 10/04/22  0915 10/05/22  0428   WBC 10.7 9.1 7.5   RBC 3.96 3.66* 3.72*   HGB 13.1 11.9* 12.0*   HCT 39.2 35.8* 36.7*   MCV 99.0 97.8 98.7   MCH 33.1 32.5 32.3   MCHC 33.4 33.2 32.7   RDW 12.5 12.5 12.5    171 184   MPV 10.1 9.7 9.4           Assessment:    Principal Problem:    Diverticulitis  Active Problems:    Acute cystitis without hematuria    Pyelonephritis    Colovesical fistula  Resolved Problems:    * No resolved hospital problems. *      Plan:  1. Left sided pyelonephritis from colovesical fistula: pt with known colovesical fistula: He has been doing outpt work up for this. He had a colonoscopy 3 days prior to admission. Reporting feeling well after procedure and no issues. However on Am of admission he woke up with nausea, dry heaves, and fever came to ER. Imaging in ER- CT/ abd revealed diverticulosis with focal thickening of the sigmoid colon which may be uncomplicated diverticulitis. However colovesical fistula with significantly thickened and inflamed urinary bladder concerning for cystitis with possibly extending inflammation to involve the left urteter extending to left renal pelvis- ureteritis- possible pyelonephritis. UA reviewed many bacteria/ large leukocytes. No leukocytosis on labs. T 101 on admission- Started on ceftriaxone. Urology consulted/ surgery/ ID consulted. Pain control/ antiemetics. ID consulted to assist with antibiotics. Blood cultures 24 h no growth. Fever again overnight. Repeat Blood cultures sent per ID recommendation. Still reporting dysuria. UC E coli- sensitive. Today reporting constipated. Pt anxious to have colovesical fistula fixed. Per surgery- no acute intervention planned. ? Outpatient vs inpatient. Per ID will need fixed soon to prevent infections. ID recommending fistula repair later this admission if possible. 2. Possible diverticulitis: Imaging reviewed. Continue abx- ceftriaxone/flagyl. Surgery consulted. Diet advanced to general diet and tolerating. 3. HLD: on statin at home     4. H/o CVA: on asa at home     5. GERD      6. Hyperglycemia: check hga1c- pending. 7. Constipation: bowel regimen- trial of miralax. Time spent reviewing chart, clinical exam, discussing case and answering questions with staff/consultants/patient/family = 20 minutes       NOTE: This report was transcribed using voice recognition software. Every effort was made to ensure accuracy; however, inadvertent computerized transcription errors may be present. Electronically signed by NIGEL Sanchez on 10/5/2022 at 7:50 AM    Addendum: I have personally participated in the history, exam, medical decision making with Arnol Youssef NP on the date of service and I agree with all of the pertinent clinical information unless otherwise noted. I have also reviewed and agree with the past medical, family, and social history unless otherwise noted. Patient was admitted with left-sided pyelonephritis secondary to colovesical fistula. Infectious diseases, urology, general surgery are consulted and following. He continues on IV ceftriaxone 2000 mg every 24 hours, Flagyl 500 mg p.o. every 8 hours. PHYSICAL EXAM:  Vitals:  BP (!) 152/74   Pulse 90   Temp 98 °F (36.7 °C) (Oral)   Resp 16   Ht 5' 4\" (1.626 m)   Wt 156 lb (70.8 kg)   SpO2 93%   BMI 26.78 kg/m²   Gen: awake, alert, NAD  Lungs: clear to auscultation bilaterally no crackles no wheezing. Heart: RRR, no murmur   Abdomen: soft nontender nondistended positive bowel sounds. Extremities: full range of motion no peripheral edema. Impression:  Principal Problem:    Diverticulitis  Active Problems:    Acute cystitis without hematuria    Pyelonephritis    Colovesical fistula  Resolved Problems:    * No resolved hospital problems.  *      My findings/plan include:  Patient is admitted with left-sided pyelonephritis secondary to colovesical fistula. Infectious diseases, urology, general surgery are consulted and following. Patient continues on IV ceftriaxone 2000 mg every 24 hours and Flagyl 500 mg every 8 hours. Patient had a recurrent fever greater than 101 overnight. I have ordered repeat blood cultures this morning. Initial blood cultures obtained have been no growth at 24 hours. Urine culture has resulted E. coli greater than 100,000 CFU's per mL that is pansensitive. We will follow-up with ID regarding antimicrobial recommendations. ID is recommending surgical fixation of colovesical fistula this hospitalization once infection has been treated, otherwise he will just have recurrent UTIs. We will follow-up with general surgery and urology regarding recommendations. NOTE: This report was transcribed using voice recognition software. Every effort was made to ensure accuracy; however, inadvertent computerized transcription errors may be present.   Electronically signed by Orin Ocampo MD on 10/5/2022 at 10:26 AM

## 2022-10-05 NOTE — PROGRESS NOTES
10/5/2022 4:40 PM  Rashawn Goss  [id number]    Subjective:    Walking around his room  Wife present  Feels he is voiding comfortably, still passing some stool at times  No fevers today    Review of Systems  Constitutional: No fever or chills   Respiratory: negative for cough and hemoptysis  Cardiovascular: negative for chest pain and dyspnea  Gastrointestinal: Positive for abdominal pain : See above  Derm: negative for rash and skin lesion(s)  Neurological: negative for seizures and tremors  Musculoskeletal: Negative    Psychiatric: Negative   All other reviews are negative      Scheduled Meds:   polyethylene glycol  17 g Oral Once    metroNIDAZOLE  500 mg Oral 3 times per day    cefTRIAXone (ROCEPHIN) IV  2,000 mg IntraVENous Q24H    sodium chloride flush  5-40 mL IntraVENous 2 times per day    enoxaparin  40 mg SubCUTAneous Daily       Objective:  Vitals:    10/05/22 0715   BP: (!) 152/74   Pulse: 90   Resp: 16   Temp: 98 °F (36.7 °C)   SpO2: 93%         Allergies: Ciprofloxacin, Penicillins, and Bactrim [sulfamethoxazole-trimethoprim]    General Appearance: alert and oriented to person, place and time and in no acute distress  Skin: no rash or erythema  Head: normocephalic and atraumatic  Pulmonary/Chest: normal air movement, no respiratory distress  Abdomen: soft, non-tender, non-distended  Genitourinary: No Lao  Extremities: no cyanosis, clubbing or edema         Labs:     Recent Labs     10/05/22  0428      K 4.0      CO2 26   BUN 15   CREATININE 0.9   GLUCOSE 119*   CALCIUM 8.9       Lab Results   Component Value Date/Time    HGB 12.0 10/05/2022 04:28 AM    HCT 36.7 10/05/2022 04:28 AM       Lab Results   Component Value Date    PSA <0.03 10/28/2021    PSA <0.03 10/22/2020    PSA 0.02 10/03/2019         Assessment/Plan:  Colovesical fistula   Bilateral Renal Cysts   E. Coli UTI  PCA s/p RALP (September 2013)  Incontinence s/p male sling 2015   Left pyelonephritis      Creatinine stable Urine culture, +E. Coli  Antibiotics per ID  CTAP reviewed  General surgery following for colovesical fistula  Check PVR   Hold on acute  interventions currently      Michelet Chakraborty, APRN - CNP   [facility]

## 2022-10-05 NOTE — CARE COORDINATION
Updated plan of care. Cultures pending(neg so far). Continue iv antibiotics, surgery following.  Plan remains home with no needs-o

## 2022-10-05 NOTE — PROGRESS NOTES
Patient and patient's wife both requesting update from surgery team regarding potential inpatient fistula repair. Gen surg resident paged -- will round on patient tomorrow to discuss, but as of now plan is still elective, outpatient takedown of fistula after inflammation has improved.

## 2022-10-06 LAB
ANION GAP SERPL CALCULATED.3IONS-SCNC: 8 MMOL/L (ref 7–16)
BUN BLDV-MCNC: 11 MG/DL (ref 6–23)
CALCIUM SERPL-MCNC: 9.1 MG/DL (ref 8.6–10.2)
CHLORIDE BLD-SCNC: 102 MMOL/L (ref 98–107)
CO2: 25 MMOL/L (ref 22–29)
CREAT SERPL-MCNC: 0.8 MG/DL (ref 0.7–1.2)
GFR AFRICAN AMERICAN: >60
GFR NON-AFRICAN AMERICAN: >60 ML/MIN/1.73
GLUCOSE BLD-MCNC: 98 MG/DL (ref 74–99)
HCT VFR BLD CALC: 40.5 % (ref 37–54)
HEMOGLOBIN: 13.4 G/DL (ref 12.5–16.5)
MCH RBC QN AUTO: 33.1 PG (ref 26–35)
MCHC RBC AUTO-ENTMCNC: 33.1 % (ref 32–34.5)
MCV RBC AUTO: 100 FL (ref 80–99.9)
PDW BLD-RTO: 12.4 FL (ref 11.5–15)
PLATELET # BLD: 192 E9/L (ref 130–450)
PMV BLD AUTO: 9.6 FL (ref 7–12)
POTASSIUM SERPL-SCNC: 3.9 MMOL/L (ref 3.5–5)
RBC # BLD: 4.05 E12/L (ref 3.8–5.8)
SODIUM BLD-SCNC: 135 MMOL/L (ref 132–146)
WBC # BLD: 7.1 E9/L (ref 4.5–11.5)

## 2022-10-06 PROCEDURE — 6370000000 HC RX 637 (ALT 250 FOR IP): Performed by: STUDENT IN AN ORGANIZED HEALTH CARE EDUCATION/TRAINING PROGRAM

## 2022-10-06 PROCEDURE — 36415 COLL VENOUS BLD VENIPUNCTURE: CPT

## 2022-10-06 PROCEDURE — 1200000000 HC SEMI PRIVATE

## 2022-10-06 PROCEDURE — 99232 SBSQ HOSP IP/OBS MODERATE 35: CPT | Performed by: INTERNAL MEDICINE

## 2022-10-06 PROCEDURE — 2580000003 HC RX 258: Performed by: INTERNAL MEDICINE

## 2022-10-06 PROCEDURE — 6360000002 HC RX W HCPCS: Performed by: INTERNAL MEDICINE

## 2022-10-06 PROCEDURE — 80048 BASIC METABOLIC PNL TOTAL CA: CPT

## 2022-10-06 PROCEDURE — 85027 COMPLETE CBC AUTOMATED: CPT

## 2022-10-06 PROCEDURE — 51798 US URINE CAPACITY MEASURE: CPT

## 2022-10-06 RX ADMIN — METRONIDAZOLE 500 MG: 500 TABLET ORAL at 14:32

## 2022-10-06 RX ADMIN — METRONIDAZOLE 500 MG: 500 TABLET ORAL at 22:10

## 2022-10-06 RX ADMIN — METRONIDAZOLE 500 MG: 500 TABLET ORAL at 06:11

## 2022-10-06 RX ADMIN — WATER 2000 MG: 1 INJECTION INTRAMUSCULAR; INTRAVENOUS; SUBCUTANEOUS at 09:18

## 2022-10-06 RX ADMIN — SODIUM CHLORIDE, PRESERVATIVE FREE 10 ML: 5 INJECTION INTRAVENOUS at 22:10

## 2022-10-06 RX ADMIN — ENOXAPARIN SODIUM 40 MG: 100 INJECTION SUBCUTANEOUS at 09:18

## 2022-10-06 NOTE — PROGRESS NOTES
Patient voiding, bladder scanned PVR of 0ml. Patient currently denies any pain and discomfort at this time.

## 2022-10-06 NOTE — PROGRESS NOTES
[address] [place] [address] [facility]  Progress Note    SUBJECTIVE:  Chief Complaint   Patient presents with    Abdominal Pain     Has fistula between bowl and bladder    Fever     102.4 for ems      Patient is tolerating medications. No reported adverse drug reactions. No nausea, vomiting, diarrhea. Feeling better this morning, up to the chair   Happy that he is having his procedure on Monday   Afebrile overnight     Review of systems:  As stated above in the chief complaint, otherwise negative. Medications:  Scheduled Meds:   polyethylene glycol  17 g Oral Once    metroNIDAZOLE  500 mg Oral 3 times per day    cefTRIAXone (ROCEPHIN) IV  2,000 mg IntraVENous Q24H    sodium chloride flush  5-40 mL IntraVENous 2 times per day    enoxaparin  40 mg SubCUTAneous Daily     Continuous Infusions:   sodium chloride       PRN Meds:sodium chloride, morphine, sodium chloride flush, sodium chloride, ondansetron **OR** ondansetron, polyethylene glycol, acetaminophen **OR** acetaminophen    OBJECTIVE:  /79   Pulse 65   Temp 98.9 °F (37.2 °C) (Oral)   Resp 16   Ht 5' 4\" (1.626 m)   Wt 156 lb (70.8 kg)   SpO2 95%   BMI 26.78 kg/m²   Temp  Av.6 °F (37 °C)  Min: 98.5 °F (36.9 °C)  Max: 98.9 °F (37.2 °C)  Constitutional: The patient is awake, alert, and oriented. Up to the chair. In no distress. Skin: Warm and dry. No rashes were noted. HEENT: Round and reactive pupils. Moist mucous membranes. No ulcerations or thrush. Neck: Supple to movements. Chest: No use of accessory muscles to breathe. Symmetrical expansion. No wheezing, crackles or rhonchi. Cardiovascular: S1 and S2 are rhythmic and regular. No murmurs appreciated. Abdomen: Positive bowel sounds to auscultation. Minimal LLQ tendernes to palpation. No masses felt. Extremities: No edema.   Lines: peripheral    Laboratory and Tests Review:  Lab Results   Component Value Date    WBC 7.1 10/06/2022    WBC 7.5 10/05/2022    WBC 9.1 10/04/2022    HGB 13.4 10/06/2022    HCT 40.5 10/06/2022    .0 (H) 10/06/2022     10/06/2022     Lab Results   Component Value Date    NEUTROABS 7.42 (H) 10/04/2022    NEUTROABS 8.65 (H) 10/03/2022    NEUTROABS 9.44 (H) 10/02/2022     No results found for: CRPHS  Lab Results   Component Value Date    ALT 15 10/03/2022    AST 16 10/03/2022    ALKPHOS 58 10/03/2022    BILITOT 0.8 10/03/2022     Lab Results   Component Value Date/Time     10/06/2022 05:02 AM    K 3.9 10/06/2022 05:02 AM    K 4.0 10/04/2022 09:15 AM     10/06/2022 05:02 AM    CO2 25 10/06/2022 05:02 AM    BUN 11 10/06/2022 05:02 AM    CREATININE 0.8 10/06/2022 05:02 AM    CREATININE 0.9 10/05/2022 04:28 AM    CREATININE 0.9 10/04/2022 09:15 AM    GFRAA >60 10/06/2022 05:02 AM    LABGLOM >60 10/06/2022 05:02 AM    GLUCOSE 98 10/06/2022 05:02 AM    PROT 6.6 10/03/2022 04:20 AM    LABALBU 3.7 10/03/2022 04:20 AM    CALCIUM 9.1 10/06/2022 05:02 AM    BILITOT 0.8 10/03/2022 04:20 AM    ALKPHOS 58 10/03/2022 04:20 AM    AST 16 10/03/2022 04:20 AM    ALT 15 10/03/2022 04:20 AM     Lab Results   Component Value Date    CRP <0.1 04/23/2018     Lab Results   Component Value Date    SEDRATE 4 09/17/2020    SEDRATE 24 (H) 04/23/2018     Radiology:  Reviewed     Microbiology:   Blood cultures: negative so far  Urine culture: >100K E.coli (pansensitive)     ASSESSMENT:  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 Above discussed agree with advanced practice nurse. Labs, cultures, and radiographs reviewed. Face to Face encounter occurred. Changes made as necessary.      Omayra Grier MD

## 2022-10-06 NOTE — PROGRESS NOTES
Patient currently voiding, bladder scanned PVR of 28 ml. Patient denies any pain and discomfort at this time.

## 2022-10-06 NOTE — PLAN OF CARE
Problem: Discharge Planning  Goal: Discharge to home or other facility with appropriate resources  Outcome: Progressing  Flowsheets (Taken 10/5/2022 2024)  Discharge to home or other facility with appropriate resources: Identify barriers to discharge with patient and caregiver     Problem: Pain  Goal: Verbalizes/displays adequate comfort level or baseline comfort level  Outcome: Progressing     Problem: Safety - Adult  Goal: Free from fall injury  Outcome: Progressing  Flowsheets (Taken 10/5/2022 2024)  Free From Fall Injury: Instruct family/caregiver on patient safety

## 2022-10-06 NOTE — PROGRESS NOTES
[facility]   Progress Note    Admitting Date and Time: 10/2/2022  4:51 PM  Admit Dx: Diverticulitis [K57.92]  Diverticulitis of colon [K57.32]  Acute cystitis without hematuria [N30.00]    Seen for follow on multiple problems as listed below    Subjective:  Denies abd pain , n/v/d/, tolerating diet , NO CP or sob. D/w nursing . Uneventful night. ROS: denies fever, chills, cp, sob, n/v, HA unless stated above.      polyethylene glycol  17 g Oral Once    metroNIDAZOLE  500 mg Oral 3 times per day    cefTRIAXone (ROCEPHIN) IV  2,000 mg IntraVENous Q24H    sodium chloride flush  5-40 mL IntraVENous 2 times per day    enoxaparin  40 mg SubCUTAneous Daily     sodium chloride, 1 spray, Q4H PRN  morphine, 2 mg, Q4H PRN  sodium chloride flush, 5-40 mL, PRN  sodium chloride, , PRN  ondansetron, 4 mg, Q8H PRN   Or  ondansetron, 4 mg, Q6H PRN  polyethylene glycol, 17 g, Daily PRN  acetaminophen, 650 mg, Q6H PRN   Or  acetaminophen, 650 mg, Q6H PRN         Objective:    /79   Pulse 65   Temp 98.9 °F (37.2 °C) (Oral)   Resp 16   Ht 5' 4\" (1.626 m)   Wt 156 lb (70.8 kg)   SpO2 95%   BMI 26.78 kg/m²   General Appearance: alert and oriented to person, place and time,  in no acute distress  Skin: warm and dry  Head: normocephalic and atraumatic  Eyes: extraocular eye movements intact, conjunctivae normal  Neck: supple and non-tender without mass  Pulmonary/Chest: clear to auscultation bilaterally  Cardiovascular: normal rate, regular rhythm, normal S1 and S2  Abdomen: soft, non-tender, non-distended, normal bowel sounds, no masses or organomegaly  Extremities: no cyanosis, clubbing   Neurologic:  no cranial nerve deficit, speech normal      Recent Labs     10/04/22  0915 10/05/22  0428 10/06/22  0502    135 135   K 4.0 4.0 3.9    101 102   CO2 24 26 25   BUN 14 15 11   CREATININE 0.9 0.9 0.8   GLUCOSE 180* 119* 98   CALCIUM 8.7 8.9 9.1       Recent Labs     10/04/22  0915 10/05/22  0428 10/06/22  0502   WBC 9.1 7.5 7.1   RBC 3.66* 3.72* 4.05   HGB 11.9* 12.0* 13.4   HCT 35.8* 36.7* 40.5   MCV 97.8 98.7 100.0*   MCH 32.5 32.3 33.1   MCHC 33.2 32.7 33.1   RDW 12.5 12.5 12.4    184 192   MPV 9.7 9.4 9.6       Labs and images reviewed     Radiology:   CT ABDOMEN PELVIS W IV CONTRAST Additional Contrast? None   Final Result   Diverticulosis of colon with focal thickening of the sigmoid colon which may   be due to uncomplicated diverticulitis. Colonoscopy may be considered when   feasible to exclude underlying malignancy. Colovesical fistula with significantly thickened and inflamed urinary bladder   concerning for cystitis with possibly  extending inflammation  to involve the   left ureter extending to left renal pelvis resulting in ureteritis and   possibly pyelonephritis. Please correlate with urinalysis. XR CHEST PORTABLE   Final Result   No acute process. Assessment:    Principal Problem:    Diverticulitis  Active Problems:    Acute cystitis without hematuria    Pyelonephritis    Colovesical fistula  Resolved Problems:    * No resolved hospital problems. *      Plan:  Left pyelonephritis sec to  colovesicular fistula-known to have a colovesicular fistula had colonoscopy 3 days prior to admission. Admitted with nausea, dry heaves and fever. CT with uncomplicated diverticulitis, colovesicular fistula with significantly thickened and inflamed urinary bladder concerning for cystitis and possibly extending inflammation to involve left ureter and pelvis, with possible pyelonephritis. UA with possible UTI. Was febrile on admission 101. Urology/ID/surgery consulted and following. On IV ceftriaxone and p.o. Flagyl case. Follow-up blood cultures with no growth stain. Urine cultures with E. coli sensitive to ceftriaxone. ID discussed with general surgery and fistula repair scheduled on 10/10 /22. Continue other supportive treatment.      Acute diverticulitis as per CT-on IV ceftriaxone and Flagyl. As above ID following. Hyperlipidemia, history of CVA -on statin and aspirin. Hyperglycemia-HbA1c 6.2. Monitor blood sugar. Constipation-on bowel regimen  On Lovenox for DVT prophylaxis  Full code.                  Electronically signed by Mukul Rodriguez MD on 10/6/2022 at 7:45 AM

## 2022-10-06 NOTE — PROGRESS NOTES
Patient continues to void, bladder scanned PVR of 0ml. Patient denies any pain and discomfort at this time.

## 2022-10-06 NOTE — PROGRESS NOTES
GENERAL SURGERY  DAILY PROGRESS NOTE  10/6/2022    Subjective:  No events overnight. Tolerating diet and having BM's. Overall, abdominal pain has improved and is well controlled currently. Did have sweating overnight, still having diarrhea. Afebrile. Hemodynamically stable. Wbc 7.1 hb 13.4     Objective:  /68   Pulse 60   Temp 98.5 °F (36.9 °C) (Oral)   Resp 16   Ht 5' 4\" (1.626 m)   Wt 156 lb (70.8 kg)   SpO2 99%   BMI 26.78 kg/m²     General appearance: alert, cooperative and in no acute distress. Eyes: grossly normal  Lungs: nonlabored breathing on room air   Heart: regular rate  Abdomen:  soft, non distended. Mild tenderness to LLQ and left flank but improved from prior  Skin: No skin abnormalities  Neurologic: Alert and oriented x 3. Grossly normal  Musculoskeletal: No clubbing cyanosis or edema    Assessment/Plan:  78 y.o. male with history of colovesical fistula now with pyelonephritis, concern for recurrent pyelo without fistula takedown    - ID following for antibiotic regimen and treatment of pyelonephritis. Still on rocephin/flagyl.    - plan for fistula takedown with patient on Monday 10/10 with patient this AM.  - diet as tolerated over the weekend   - pain and nausea control as needed     Electronically signed by Darnell Fink DO on 10/6/2022 at 5:21 AM    ATTENDING PHYSICIAN PROGRESS NOTE      I have examined the patient, reviewed the record, and discussed the case with the Resident. I have reviewed all relevant labs and imaging data. Please refer to the resident's note. I agree with the assessment and plan with the following corrections/ additions. The following summarizes my clinical findings and independent assessment.      James Cortes MD

## 2022-10-06 NOTE — PROGRESS NOTES
P Quality Flow/Interdisciplinary Rounds Progress Note        Quality Flow Rounds held on October 6, 2022    Disciplines Attending:  Bedside Nurse, , , and Nursing Unit Leadership    Rianna Lyman was admitted on 10/2/2022  4:51 PM    Anticipated Discharge Date:       Disposition:    Dani Score:  Dani Scale Score: 21    Readmission Risk              Risk of Unplanned Readmission:  8           Discussed patient goal for the day, patient clinical progression, and barriers to discharge.   The following Goal(s) of the Day/Commitment(s) have been identified:   Discharge [address] [place]  October 6, 2022

## 2022-10-07 LAB
BLOOD CULTURE, ROUTINE: NORMAL
CULTURE, BLOOD 2: NORMAL

## 2022-10-07 PROCEDURE — 6370000000 HC RX 637 (ALT 250 FOR IP): Performed by: STUDENT IN AN ORGANIZED HEALTH CARE EDUCATION/TRAINING PROGRAM

## 2022-10-07 PROCEDURE — 51798 US URINE CAPACITY MEASURE: CPT

## 2022-10-07 PROCEDURE — 6370000000 HC RX 637 (ALT 250 FOR IP): Performed by: INTERNAL MEDICINE

## 2022-10-07 PROCEDURE — 2580000003 HC RX 258: Performed by: INTERNAL MEDICINE

## 2022-10-07 PROCEDURE — 99232 SBSQ HOSP IP/OBS MODERATE 35: CPT | Performed by: INTERNAL MEDICINE

## 2022-10-07 PROCEDURE — 6360000002 HC RX W HCPCS: Performed by: INTERNAL MEDICINE

## 2022-10-07 PROCEDURE — 1200000000 HC SEMI PRIVATE

## 2022-10-07 RX ORDER — ATORVASTATIN CALCIUM 10 MG/1
10 TABLET, FILM COATED ORAL DAILY
Refills: 1 | Status: DISCONTINUED | OUTPATIENT
Start: 2022-10-07 | End: 2022-10-13 | Stop reason: HOSPADM

## 2022-10-07 RX ADMIN — SODIUM CHLORIDE, PRESERVATIVE FREE 10 ML: 5 INJECTION INTRAVENOUS at 09:32

## 2022-10-07 RX ADMIN — WATER 2000 MG: 1 INJECTION INTRAMUSCULAR; INTRAVENOUS; SUBCUTANEOUS at 09:30

## 2022-10-07 RX ADMIN — SODIUM CHLORIDE, PRESERVATIVE FREE 10 ML: 5 INJECTION INTRAVENOUS at 21:01

## 2022-10-07 RX ADMIN — METRONIDAZOLE 500 MG: 500 TABLET ORAL at 13:35

## 2022-10-07 RX ADMIN — ENOXAPARIN SODIUM 40 MG: 100 INJECTION SUBCUTANEOUS at 09:30

## 2022-10-07 RX ADMIN — METRONIDAZOLE 500 MG: 500 TABLET ORAL at 22:04

## 2022-10-07 RX ADMIN — METRONIDAZOLE 500 MG: 500 TABLET ORAL at 05:35

## 2022-10-07 RX ADMIN — SERTRALINE HYDROCHLORIDE 50 MG: 50 TABLET ORAL at 09:40

## 2022-10-07 RX ADMIN — ATORVASTATIN CALCIUM 10 MG: 10 TABLET, FILM COATED ORAL at 21:01

## 2022-10-07 NOTE — PROGRESS NOTES
Voided 250cc clear yellow urine, bladder scan showed pvr of >233 after several different readings of lesser value.  Denies complaints Have You Had Botox Before?: has had botox When Was Your Last Botox Treatment?: 05/24/2018

## 2022-10-07 NOTE — PROGRESS NOTES
[facility]   Progress Note    Admitting Date and Time: 10/2/2022  4:51 PM  Admit Dx: Diverticulitis [K57.92]  Diverticulitis of colon [K57.32]  Acute cystitis without hematuria [N30.00]    Seen for follow on multiple problems as listed below    Subjective:  Uneventful night , d/w nursing, denies CP , sob , n/v/d/abd pain. For OR on Monday. ROS: denies fever, chills, cp, sob, n/v, HA unless stated above.      polyethylene glycol  17 g Oral Once    metroNIDAZOLE  500 mg Oral 3 times per day    cefTRIAXone (ROCEPHIN) IV  2,000 mg IntraVENous Q24H    sodium chloride flush  5-40 mL IntraVENous 2 times per day    enoxaparin  40 mg SubCUTAneous Daily     sodium chloride, 1 spray, Q4H PRN  morphine, 2 mg, Q4H PRN  sodium chloride flush, 5-40 mL, PRN  sodium chloride, , PRN  ondansetron, 4 mg, Q8H PRN   Or  ondansetron, 4 mg, Q6H PRN  polyethylene glycol, 17 g, Daily PRN  acetaminophen, 650 mg, Q6H PRN   Or  acetaminophen, 650 mg, Q6H PRN       Objective:    BP (!) 141/67   Pulse 52   Temp 98.8 °F (37.1 °C) (Oral)   Resp 16   Ht 5' 4\" (1.626 m)   Wt 156 lb (70.8 kg)   SpO2 98%   BMI 26.78 kg/m²   General Appearance: alert and oriented to person, place and time,  in no acute distress  Skin: warm and dry  Head: normocephalic and atraumatic  Eyes: extraocular eye movements intact, conjunctivae normal  Neck: supple and non-tender without mass  Pulmonary/Chest: clear to auscultation bilaterally  Cardiovascular: normal rate, regular rhythm, normal S1 and S2  Abdomen: soft, non-tender, non-distended, normal bowel sounds, no masses or organomegaly  Extremities: no cyanosis, clubbing   Neurologic:  no cranial nerve deficit, speech normal      Recent Labs     10/04/22  0915 10/05/22  0428 10/06/22  0502    135 135   K 4.0 4.0 3.9    101 102   CO2 24 26 25   BUN 14 15 11   CREATININE 0.9 0.9 0.8   GLUCOSE 180* 119* 98   CALCIUM 8.7 8.9 9.1       Recent Labs     10/04/22  0915 10/05/22  0428 10/06/22  0502   WBC 9.1 7.5 7.1   RBC 3.66* 3.72* 4.05   HGB 11.9* 12.0* 13.4   HCT 35.8* 36.7* 40.5   MCV 97.8 98.7 100.0*   MCH 32.5 32.3 33.1   MCHC 33.2 32.7 33.1   RDW 12.5 12.5 12.4    184 192   MPV 9.7 9.4 9.6       Labs and images reviewed     Radiology:   CT ABDOMEN PELVIS W IV CONTRAST Additional Contrast? None   Final Result   Diverticulosis of colon with focal thickening of the sigmoid colon which may   be due to uncomplicated diverticulitis. Colonoscopy may be considered when   feasible to exclude underlying malignancy. Colovesical fistula with significantly thickened and inflamed urinary bladder   concerning for cystitis with possibly  extending inflammation  to involve the   left ureter extending to left renal pelvis resulting in ureteritis and   possibly pyelonephritis. Please correlate with urinalysis. XR CHEST PORTABLE   Final Result   No acute process. Assessment:    Principal Problem:    Diverticulitis  Active Problems:    Acute cystitis without hematuria    Pyelonephritis    Colovesical fistula  Resolved Problems:    * No resolved hospital problems. *      Plan:  Left pyelonephritis sec to  colovesicular fistula-known to have a colovesicular fistula had colonoscopy 3 days prior to admission. Admitted with nausea, dry heaves and fever. CT with uncomplicated diverticulitis, colovesicular fistula with significantly thickened and inflamed urinary bladder concerning for cystitis and possibly extending inflammation to involve left ureter and pelvis, with possible pyelonephritis. UA with possible UTI. Was febrile on admission 101. Urology/ID/surgery consulted and following. On IV ceftriaxone and p.o. Flagyl case. Follow-up blood cultures neg . Urine cultures with E. coli sensitive to ceftriaxone. ID discussed with general surgery and fistula repair scheduled on 10/10 /22. Continue other supportive treatment.      Acute diverticulitis as per CT-on IV ceftriaxone and Flagyl. As above ID following. Hyperlipidemia, history of CVA -on statin and aspirin. Hyperglycemia-HbA1c 6.2. Monitor blood sugar. Constipation-on bowel regimen  On Lovenox for DVT prophylaxis  Full code.                  Electronically signed by Vicky Velazquez MD on 10/7/2022 at 8:30 AM

## 2022-10-07 NOTE — PROGRESS NOTES
Comprehensive Nutrition Assessment    Type and Reason for Visit:  Initial, RD Nutrition Re-Screen/LOS    Nutrition Recommendations/Plan:   Continue current diet, monitor for carb choice diet prn. Will continue to monitor. Nutrition Assessment:    Pt w/ L pyelonephritis 2/2 colovesicular fistula w/ plan for fistula takedown. Note acute diverticulitis. Hx CVA, prostate CA s/p prostatectomy. Pt consuming >75% of meals, continue current diet as tolerated & monitor. Nutrition Related Findings:    A&O, -I&Os, abd rounded, +BS, A1c 6.2, Glu 98 Wound Type: None       Current Nutrition Intake & Therapies:    Average Meal Intake: %  Average Supplements Intake: None Ordered  ADULT DIET; Regular    Anthropometric Measures:  Height: 5' 4\" (162.6 cm)  Ideal Body Weight (IBW): 130 lbs (59 kg)       Current Body Weight: 156 lb (70.8 kg), 120 % IBW. Weight Source: Not Specified (10/2 UTO updated CBW d/t pt not in room)  Current BMI (kg/m2): 26.8  Usual Body Weight:  (no wt hx <1 year per EMR, 8/2020 166# actual per EMR)     Weight Adjustment For: No Adjustment                 BMI Categories: Overweight (BMI 25.0-29. 9)    Nutrition Diagnosis:   No nutrition diagnosis at this time    Nutrition Interventions:   Food and/or Nutrient Delivery: Continue Current Diet (monitor for carb choice diet prn)  Nutrition Education/Counseling: No recommendation at this time  Coordination of Nutrition Care: Continue to monitor while inpatient       Goals:     Goals: PO intake 75% or greater       Nutrition Monitoring and Evaluation:      Food/Nutrient Intake Outcomes: Food and Nutrient Intake  Physical Signs/Symptoms Outcomes: Biochemical Data, GI Status, Fluid Status or Edema, Nutrition Focused Physical Findings, Skin, Weight, Constipation    Discharge Planning:     Too soon to determine     Bridget Okeefe RD, LD  Contact: 2032

## 2022-10-07 NOTE — PATIENT CARE CONFERENCE
[facility] Quality Flow/Interdisciplinary Rounds Progress Note        Quality Flow Rounds held on October 7, 2022    Disciplines Attending:  Bedside Nurse, , , and Nursing Unit Leadership    Rashawn Goss was admitted on 10/2/2022  4:51 PM    Anticipated Discharge Date:       Disposition:    Dani Score:  Dani Scale Score: 20    Readmission Risk              Risk of Unplanned Readmission:  8           Discussed patient goal for the day, patient clinical progression, and barriers to discharge. The following Goal(s) of the Day/Commitment(s) have been identified:  plan for surgery on Monday. Monitor urine output. Manage pain.        Carlos Steele RN  October 7, 2022

## 2022-10-07 NOTE — PROGRESS NOTES
[address] [place] [address] [facility]  Progress Note    SUBJECTIVE:  Chief Complaint   Patient presents with    Abdominal Pain     Has fistula between bowl and bladder    Fever     102.4 for ems      Patient is tolerating medications. No reported adverse drug reactions. No nausea, vomiting, diarrhea. Up walking around the room, feels much better  Denies abdominal pain   Afebrile overnight     Review of systems:  As stated above in the chief complaint, otherwise negative. Medications:  Scheduled Meds:   sertraline  50 mg Oral Daily    atorvastatin  10 mg Oral Daily    polyethylene glycol  17 g Oral Once    metroNIDAZOLE  500 mg Oral 3 times per day    cefTRIAXone (ROCEPHIN) IV  2,000 mg IntraVENous Q24H    sodium chloride flush  5-40 mL IntraVENous 2 times per day    enoxaparin  40 mg SubCUTAneous Daily     Continuous Infusions:   sodium chloride       PRN Meds:sodium chloride, morphine, sodium chloride flush, sodium chloride, ondansetron **OR** ondansetron, polyethylene glycol, acetaminophen **OR** acetaminophen    OBJECTIVE:  BP (!) 141/67   Pulse 52   Temp 98.8 °F (37.1 °C) (Oral)   Resp 16   Ht 5' 4\" (1.626 m)   Wt 156 lb (70.8 kg)   SpO2 98%   BMI 26.78 kg/m²   Temp  Av °F (37.2 °C)  Min: 98.8 °F (37.1 °C)  Max: 99.2 °F (37.3 °C)  Constitutional: The patient is awake, alert, and oriented. Up in room walking around. In no distress. Skin: Warm and dry. No rashes were noted. HEENT: Round and reactive pupils. Moist mucous membranes. No ulcerations or thrush. Neck: Supple to movements. Chest: No use of accessory muscles to breathe. Symmetrical expansion. No wheezing, crackles or rhonchi. Cardiovascular: S1 and S2 are rhythmic and regular. No murmurs appreciated. Abdomen: Positive bowel sounds to auscultation. Minimal LLQ tendernes to palpation. No masses felt. Extremities: No edema.   Lines: peripheral    Laboratory and Tests Review:  Lab Results   Component Value Date WBC 7.1 10/06/2022    WBC 7.5 10/05/2022    WBC 9.1 10/04/2022    HGB 13.4 10/06/2022    HCT 40.5 10/06/2022    .0 (H) 10/06/2022     10/06/2022     Lab Results   Component Value Date    NEUTROABS 7.42 (H) 10/04/2022    NEUTROABS 8.65 (H) 10/03/2022    NEUTROABS 9.44 (H) 10/02/2022     No results found for: CRPHS  Lab Results   Component Value Date    ALT 15 10/03/2022    AST 16 10/03/2022    ALKPHOS 58 10/03/2022    BILITOT 0.8 10/03/2022     Lab Results   Component Value Date/Time     10/06/2022 05:02 AM    K 3.9 10/06/2022 05:02 AM    K 4.0 10/04/2022 09:15 AM     10/06/2022 05:02 AM    CO2 25 10/06/2022 05:02 AM    BUN 11 10/06/2022 05:02 AM    CREATININE 0.8 10/06/2022 05:02 AM    CREATININE 0.9 10/05/2022 04:28 AM    CREATININE 0.9 10/04/2022 09:15 AM    GFRAA >60 10/06/2022 05:02 AM    LABGLOM >60 10/06/2022 05:02 AM    GLUCOSE 98 10/06/2022 05:02 AM    PROT 6.6 10/03/2022 04:20 AM    LABALBU 3.7 10/03/2022 04:20 AM    CALCIUM 9.1 10/06/2022 05:02 AM    BILITOT 0.8 10/03/2022 04:20 AM    ALKPHOS 58 10/03/2022 04:20 AM    AST 16 10/03/2022 04:20 AM    ALT 15 10/03/2022 04:20 AM     Lab Results   Component Value Date    CRP <0.1 04/23/2018     Lab Results   Component Value Date    SEDRATE 4 09/17/2020    SEDRATE 24 (H) 04/23/2018     Radiology:  Reviewed     Microbiology:   Blood cultures: negative so far  Urine culture: >100K E.coli (pansensitive)     ASSESSMENT:  Left sided 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 Above discussed agree with advanced practice nurse. Labs, cultures, and radiographs reviewed.   Face to Face encounter occurred. Changes made as necessary.      Claudia Melgar MD

## 2022-10-07 NOTE — PROGRESS NOTES
Patient voided 300. PVR >517. No new orders at this time. Dr. Charleen Stevens to evaluate patient this morning.

## 2022-10-08 PROCEDURE — 6370000000 HC RX 637 (ALT 250 FOR IP): Performed by: INTERNAL MEDICINE

## 2022-10-08 PROCEDURE — 6370000000 HC RX 637 (ALT 250 FOR IP): Performed by: STUDENT IN AN ORGANIZED HEALTH CARE EDUCATION/TRAINING PROGRAM

## 2022-10-08 PROCEDURE — 51798 US URINE CAPACITY MEASURE: CPT

## 2022-10-08 PROCEDURE — 6360000002 HC RX W HCPCS: Performed by: INTERNAL MEDICINE

## 2022-10-08 PROCEDURE — 1200000000 HC SEMI PRIVATE

## 2022-10-08 PROCEDURE — 99232 SBSQ HOSP IP/OBS MODERATE 35: CPT | Performed by: INTERNAL MEDICINE

## 2022-10-08 PROCEDURE — 2580000003 HC RX 258: Performed by: INTERNAL MEDICINE

## 2022-10-08 RX ADMIN — METRONIDAZOLE 500 MG: 500 TABLET ORAL at 06:20

## 2022-10-08 RX ADMIN — SODIUM CHLORIDE, PRESERVATIVE FREE 10 ML: 5 INJECTION INTRAVENOUS at 22:02

## 2022-10-08 RX ADMIN — ENOXAPARIN SODIUM 40 MG: 100 INJECTION SUBCUTANEOUS at 09:12

## 2022-10-08 RX ADMIN — ATORVASTATIN CALCIUM 10 MG: 10 TABLET, FILM COATED ORAL at 22:02

## 2022-10-08 RX ADMIN — SERTRALINE HYDROCHLORIDE 50 MG: 50 TABLET ORAL at 09:12

## 2022-10-08 RX ADMIN — ACETAMINOPHEN 650 MG: 325 TABLET ORAL at 20:25

## 2022-10-08 RX ADMIN — METRONIDAZOLE 500 MG: 500 TABLET ORAL at 22:02

## 2022-10-08 RX ADMIN — METRONIDAZOLE 500 MG: 500 TABLET ORAL at 14:43

## 2022-10-08 RX ADMIN — WATER 2000 MG: 1 INJECTION INTRAMUSCULAR; INTRAVENOUS; SUBCUTANEOUS at 09:12

## 2022-10-08 RX ADMIN — SODIUM CHLORIDE, PRESERVATIVE FREE 10 ML: 5 INJECTION INTRAVENOUS at 09:19

## 2022-10-08 ASSESSMENT — PAIN SCALES - GENERAL
PAINLEVEL_OUTOF10: 0
PAINLEVEL_OUTOF10: 4

## 2022-10-08 ASSESSMENT — PAIN DESCRIPTION - ONSET: ONSET: ON-GOING

## 2022-10-08 ASSESSMENT — PAIN DESCRIPTION - PAIN TYPE: TYPE: ACUTE PAIN

## 2022-10-08 ASSESSMENT — PAIN DESCRIPTION - LOCATION: LOCATION: HEAD

## 2022-10-08 ASSESSMENT — PAIN - FUNCTIONAL ASSESSMENT: PAIN_FUNCTIONAL_ASSESSMENT: ACTIVITIES ARE NOT PREVENTED

## 2022-10-08 ASSESSMENT — PAIN DESCRIPTION - DESCRIPTORS: DESCRIPTORS: ACHING

## 2022-10-08 ASSESSMENT — PAIN DESCRIPTION - FREQUENCY: FREQUENCY: INTERMITTENT

## 2022-10-08 NOTE — PROGRESS NOTES
General Surgery Progress Note    Surgeon:  Dr. Ariel Rivera  Subjective:   Pain:    non  Diet:    Tolerated  Nausea: None      BP (!) 127/58   Pulse 60   Temp 98.1 °F (36.7 °C) (Oral)   Resp 16   Ht 5' 4\" (1.626 m)   Wt 156 lb (70.8 kg)   SpO2 97%   BMI 26.78 kg/m²      General:  no acute distress  Lungs:  non-labored breathing  Heart:   Pulse is regular  Abdomen:  Soft, nontender, nondistended, no guarding/rebound tenderness    ASSESSMENT / PLAN:   Colovesical fistula  Bowel prep tomorrow for fistula takedown Monday    Major Hernandez MD  10/8/2022  11:07 AM

## 2022-10-08 NOTE — PROGRESS NOTES
0 [address] [place] [address] [facility]  Progress Note    SUBJECTIVE:  Chief Complaint   Patient presents with    Abdominal Pain     Has fistula between bowl and bladder    Fever     102.4 for ems      Patient is tolerating medications. No reported adverse drug reactions. No nausea, vomiting, diarrhea. In chair talkative    Review of systems:  As stated above in the chief complaint, otherwise negative. Medications:  Scheduled Meds:   sertraline  50 mg Oral Daily    atorvastatin  10 mg Oral Daily    polyethylene glycol  17 g Oral Once    metroNIDAZOLE  500 mg Oral 3 times per day    cefTRIAXone (ROCEPHIN) IV  2,000 mg IntraVENous Q24H    sodium chloride flush  5-40 mL IntraVENous 2 times per day    enoxaparin  40 mg SubCUTAneous Daily     Continuous Infusions:   sodium chloride       PRN Meds:sodium chloride, morphine, sodium chloride flush, sodium chloride, ondansetron **OR** ondansetron, polyethylene glycol, acetaminophen **OR** acetaminophen    OBJECTIVE:  BP (!) 127/58   Pulse 60   Temp 98.1 °F (36.7 °C) (Oral)   Resp 16   Ht 5' 4\" (1.626 m)   Wt 156 lb (70.8 kg)   SpO2 97%   BMI 26.78 kg/m²   Temp  Av.5 °F (36.9 °C)  Min: 98.1 °F (36.7 °C)  Max: 98.9 °F (37.2 °C)  Constitutional: The patient is awake, alert, and oriented. Skin: Warm and dry. No rashes were noted. HEENT: Round and reactive pupils. Moist mucous membranes. No ulcerations or thrush. Neck: Supple to movements. Chest: No use of accessory muscles to breathe. Symmetrical expansion. No wheezing, crackles or rhonchi. Cardiovascular: S1 and S2 are rhythmic and regular. No murmurs appreciated. Abdomen: Positive bowel sounds to auscultation. Minimal LLQ tendernes to palpation. No masses felt. Extremities: No edema.   Lines: peripheral    Laboratory and Tests Review:  Lab Results   Component Value Date    WBC 7.1 10/06/2022    WBC 7.5 10/05/2022    WBC 9.1 10/04/2022    HGB 13.4 10/06/2022    HCT 40.5 10/06/2022 .0 (H) 10/06/2022     10/06/2022     Lab Results   Component Value Date    NEUTROABS 7.42 (H) 10/04/2022    NEUTROABS 8.65 (H) 10/03/2022    NEUTROABS 9.44 (H) 10/02/2022     No results found for: CRPHS  Lab Results   Component Value Date    ALT 15 10/03/2022    AST 16 10/03/2022    ALKPHOS 58 10/03/2022    BILITOT 0.8 10/03/2022     Lab Results   Component Value Date/Time     10/06/2022 05:02 AM    K 3.9 10/06/2022 05:02 AM    K 4.0 10/04/2022 09:15 AM     10/06/2022 05:02 AM    CO2 25 10/06/2022 05:02 AM    BUN 11 10/06/2022 05:02 AM    CREATININE 0.8 10/06/2022 05:02 AM    CREATININE 0.9 10/05/2022 04:28 AM    CREATININE 0.9 10/04/2022 09:15 AM    GFRAA >60 10/06/2022 05:02 AM    LABGLOM >60 10/06/2022 05:02 AM    GLUCOSE 98 10/06/2022 05:02 AM    PROT 6.6 10/03/2022 04:20 AM    LABALBU 3.7 10/03/2022 04:20 AM    CALCIUM 9.1 10/06/2022 05:02 AM    BILITOT 0.8 10/03/2022 04:20 AM    ALKPHOS 58 10/03/2022 04:20 AM    AST 16 10/03/2022 04:20 AM    ALT 15 10/03/2022 04:20 AM     Lab Results   Component Value Date    CRP <0.1 04/23/2018     Lab Results   Component Value Date    SEDRATE 4 09/17/2020    SEDRATE 24 (H) 04/23/2018     Radiology:  Reviewed     Microbiology:   Blood cultures: negative so far  Urine culture: >100K E.coli (pansensitive)     ASSESSMENT:  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 I had a face to face encounter with the patient. Agree with exam.  Assessment and plan as outlined above and directed by me. Addition and corrections were done as deemed appropriate. My exam and plan include:  The patient is tolerating IV antibiotic. He has a metal taste from the oral metronidazole. Otherwise, doing well. Going for surgery on Monday. Spoke with wife.     Laurel Tavarez MD  10/8/2022  4:13 PM

## 2022-10-08 NOTE — PROGRESS NOTES
[facility]   Progress Note    Admitting Date and Time: 10/2/2022  4:51 PM  Admit Dx: Diverticulitis [K57.92]  Diverticulitis of colon [K57.32]  Acute cystitis without hematuria [N30.00]    Seen for follow on multiple problems as listed below    Subjective:    Denies CP ,sob , abd pain. Tolerating diet. Ambulating well. For OR on Monday. Uneventful night.     ROS: denies fever, chills, cp, sob, n/v, HA unless stated above.     sertraline  50 mg Oral Daily    atorvastatin  10 mg Oral Daily    polyethylene glycol  17 g Oral Once    metroNIDAZOLE  500 mg Oral 3 times per day    cefTRIAXone (ROCEPHIN) IV  2,000 mg IntraVENous Q24H    sodium chloride flush  5-40 mL IntraVENous 2 times per day    enoxaparin  40 mg SubCUTAneous Daily     sodium chloride, 1 spray, Q4H PRN  morphine, 2 mg, Q4H PRN  sodium chloride flush, 5-40 mL, PRN  sodium chloride, , PRN  ondansetron, 4 mg, Q8H PRN   Or  ondansetron, 4 mg, Q6H PRN  polyethylene glycol, 17 g, Daily PRN  acetaminophen, 650 mg, Q6H PRN   Or  acetaminophen, 650 mg, Q6H PRN       Objective:    BP (!) 153/77   Pulse 62   Temp 98.9 °F (37.2 °C) (Oral)   Resp 16   Ht 5' 4\" (1.626 m)   Wt 156 lb (70.8 kg)   SpO2 96%   BMI 26.78 kg/m²   General Appearance: alert and oriented to person, place and time,  in no acute distress  Skin: warm and dry  Head: normocephalic and atraumatic  Eyes: extraocular eye movements intact, conjunctivae normal  Neck: supple and non-tender without mass  Pulmonary/Chest: clear to auscultation bilaterally  Cardiovascular: normal rate, regular rhythm, normal S1 and S2  Abdomen: soft, non-tender, non-distended, normal bowel sounds, no masses or organomegaly  Extremities: no cyanosis, clubbing   Neurologic:  no cranial nerve deficit, speech normal      Recent Labs     10/06/22  0502      K 3.9      CO2 25   BUN 11   CREATININE 0.8   GLUCOSE 98   CALCIUM 9.1       Recent Labs     10/06/22  0502   WBC 7.1   RBC 4.05 HGB 13.4   HCT 40.5   .0*   MCH 33.1   MCHC 33.1   RDW 12.4      MPV 9.6       Labs and images reviewed     Radiology:   CT ABDOMEN PELVIS W IV CONTRAST Additional Contrast? None   Final Result   Diverticulosis of colon with focal thickening of the sigmoid colon which may   be due to uncomplicated diverticulitis. Colonoscopy may be considered when   feasible to exclude underlying malignancy. Colovesical fistula with significantly thickened and inflamed urinary bladder   concerning for cystitis with possibly  extending inflammation  to involve the   left ureter extending to left renal pelvis resulting in ureteritis and   possibly pyelonephritis. Please correlate with urinalysis. XR CHEST PORTABLE   Final Result   No acute process. Assessment:    Principal Problem:    Diverticulitis  Active Problems:    Acute cystitis without hematuria    Pyelonephritis    Colovesical fistula  Resolved Problems:    * No resolved hospital problems. *      Plan:  Left pyelonephritis sec to  colovesicular fistula-known to have a colovesicular fistula had colonoscopy 3 days prior to admission. Admitted with nausea, dry heaves and fever. CT with uncomplicated diverticulitis, colovesicular fistula with significantly thickened and inflamed urinary bladder concerning for cystitis and possibly extending inflammation to involve left ureter and pelvis, with possible pyelonephritis. UA with possible UTI. Was febrile on admission 101. Urology/ID/surgery consulted and following. On IV ceftriaxone and p.o. Flagyl case. Follow-up blood cultures neg . Urine cultures with E. coli sensitive to ceftriaxone. ID discussed with general surgery and fistula repair scheduled on 10/10 /22. Continue other supportive treatment. Acute diverticulitis as per CT-on IV ceftriaxone and Flagyl. As above ID following. Hyperlipidemia, history of CVA -on statin and aspirin. Hyperglycemia-HbA1c 6.2.   Monitor blood sugar. Constipation-on bowel regimen  On Lovenox for DVT prophylaxis  Full code.                  Electronically signed by Brett So MD on 10/8/2022 at 7:54 AM

## 2022-10-09 PROCEDURE — 99232 SBSQ HOSP IP/OBS MODERATE 35: CPT | Performed by: INTERNAL MEDICINE

## 2022-10-09 PROCEDURE — 6370000000 HC RX 637 (ALT 250 FOR IP): Performed by: SURGERY

## 2022-10-09 PROCEDURE — 2580000003 HC RX 258: Performed by: INTERNAL MEDICINE

## 2022-10-09 PROCEDURE — 6370000000 HC RX 637 (ALT 250 FOR IP): Performed by: INTERNAL MEDICINE

## 2022-10-09 PROCEDURE — 6360000002 HC RX W HCPCS: Performed by: INTERNAL MEDICINE

## 2022-10-09 PROCEDURE — 51798 US URINE CAPACITY MEASURE: CPT

## 2022-10-09 PROCEDURE — 2500000003 HC RX 250 WO HCPCS: Performed by: CLINICAL NURSE SPECIALIST

## 2022-10-09 PROCEDURE — 6370000000 HC RX 637 (ALT 250 FOR IP): Performed by: STUDENT IN AN ORGANIZED HEALTH CARE EDUCATION/TRAINING PROGRAM

## 2022-10-09 PROCEDURE — 1200000000 HC SEMI PRIVATE

## 2022-10-09 RX ORDER — METRONIDAZOLE 500 MG/100ML
500 INJECTION, SOLUTION INTRAVENOUS EVERY 8 HOURS
Status: DISCONTINUED | OUTPATIENT
Start: 2022-10-09 | End: 2022-10-11

## 2022-10-09 RX ADMIN — ENOXAPARIN SODIUM 40 MG: 100 INJECTION SUBCUTANEOUS at 09:16

## 2022-10-09 RX ADMIN — SODIUM CHLORIDE, PRESERVATIVE FREE 10 ML: 5 INJECTION INTRAVENOUS at 20:40

## 2022-10-09 RX ADMIN — SERTRALINE HYDROCHLORIDE 50 MG: 50 TABLET ORAL at 09:16

## 2022-10-09 RX ADMIN — ONDANSETRON 4 MG: 2 INJECTION INTRAMUSCULAR; INTRAVENOUS at 20:37

## 2022-10-09 RX ADMIN — SODIUM CHLORIDE, PRESERVATIVE FREE 10 ML: 5 INJECTION INTRAVENOUS at 09:16

## 2022-10-09 RX ADMIN — METRONIDAZOLE 500 MG: 500 INJECTION, SOLUTION INTRAVENOUS at 11:02

## 2022-10-09 RX ADMIN — POLYETHYLENE GLYCOL-3350 AND ELECTROLYTES 4000 ML: 236; 6.74; 5.86; 2.97; 22.74 POWDER, FOR SOLUTION ORAL at 15:04

## 2022-10-09 RX ADMIN — METRONIDAZOLE 500 MG: 500 INJECTION, SOLUTION INTRAVENOUS at 18:12

## 2022-10-09 RX ADMIN — METRONIDAZOLE 500 MG: 500 TABLET ORAL at 06:08

## 2022-10-09 RX ADMIN — ACETAMINOPHEN 650 MG: 325 TABLET ORAL at 09:21

## 2022-10-09 RX ADMIN — ATORVASTATIN CALCIUM 10 MG: 10 TABLET, FILM COATED ORAL at 20:37

## 2022-10-09 RX ADMIN — WATER 2000 MG: 1 INJECTION INTRAMUSCULAR; INTRAVENOUS; SUBCUTANEOUS at 09:16

## 2022-10-09 ASSESSMENT — PAIN SCALES - GENERAL: PAINLEVEL_OUTOF10: 3

## 2022-10-09 ASSESSMENT — PAIN DESCRIPTION - DESCRIPTORS: DESCRIPTORS: ACHING

## 2022-10-09 ASSESSMENT — PAIN DESCRIPTION - LOCATION: LOCATION: HEAD;NECK

## 2022-10-09 ASSESSMENT — PAIN - FUNCTIONAL ASSESSMENT: PAIN_FUNCTIONAL_ASSESSMENT: PREVENTS OR INTERFERES SOME ACTIVE ACTIVITIES AND ADLS

## 2022-10-09 NOTE — PLAN OF CARE
Problem: Discharge Planning  Goal: Discharge to home or other facility with appropriate resources  Outcome: Progressing  Flowsheets (Taken 10/8/2022 2000)  Discharge to home or other facility with appropriate resources: Identify barriers to discharge with patient and caregiver     Problem: Pain  Goal: Verbalizes/displays adequate comfort level or baseline comfort level  Outcome: Progressing     Problem: Safety - Adult  Goal: Free from fall injury  Outcome: Progressing  Flowsheets (Taken 10/8/2022 2000)  Free From Fall Injury: Instruct family/caregiver on patient safety     Problem: ABCDS Injury Assessment  Goal: Absence of physical injury  Outcome: Progressing  Flowsheets (Taken 10/8/2022 2000)  Absence of Physical Injury: Implement safety measures based on patient assessment

## 2022-10-09 NOTE — PROGRESS NOTES
[address] [place] [address] [facility]  [facility]  Progress Note    SUBJECTIVE:  Chief Complaint   Patient presents with    Abdominal Pain     Has fistula between bowl and bladder    Fever     102.4 for ems      Patient is tolerating medications. No reported adverse drug reactions. No nausea, vomiting, diarrhea. Review of systems:  As stated above in the chief complaint, otherwise negative. Medications:  Scheduled Meds:   polyethylene glycol  4,000 mL Oral Once    sertraline  50 mg Oral Daily    atorvastatin  10 mg Oral Daily    polyethylene glycol  17 g Oral Once    metroNIDAZOLE  500 mg Oral 3 times per day    cefTRIAXone (ROCEPHIN) IV  2,000 mg IntraVENous Q24H    sodium chloride flush  5-40 mL IntraVENous 2 times per day    enoxaparin  40 mg SubCUTAneous Daily     Continuous Infusions:   sodium chloride       PRN Meds:sodium chloride, morphine, sodium chloride flush, sodium chloride, ondansetron **OR** ondansetron, polyethylene glycol, acetaminophen **OR** acetaminophen    OBJECTIVE:  /74   Pulse 52   Temp 98 °F (36.7 °C) (Oral)   Resp 16   Ht 5' 4\" (1.626 m)   Wt 156 lb (70.8 kg)   SpO2 98%   BMI 26.78 kg/m²   Temp  Av.4 °F (36.9 °C)  Min: 98 °F (36.7 °C)  Max: 98.6 °F (37 °C)  Constitutional: The patient is awake, alert, and oriented. Skin: Warm and dry. No rashes were noted. HEENT: Round and reactive pupils. Moist mucous membranes. No ulcerations or thrush. Neck: Supple to movements. Chest: No use of accessory muscles to breathe. Symmetrical expansion. No wheezing, crackles or rhonchi. Cardiovascular: S1 and S2 are rhythmic and regular. No murmurs appreciated. Abdomen: Positive bowel sounds to auscultation. Minimal LLQ tendernes to palpation. No masses felt. Extremities: No edema.   Lines: peripheral    Laboratory and Tests Review:  Lab Results   Component Value Date    WBC 7.1 10/06/2022    WBC 7.5 10/05/2022    WBC 9.1 10/04/2022    HGB 13.4 10/06/2022    HCT 40.5 10/06/2022    .0 (H) 10/06/2022     10/06/2022     Lab Results   Component Value Date    NEUTROABS 7.42 (H) 10/04/2022    NEUTROABS 8.65 (H) 10/03/2022    NEUTROABS 9.44 (H) 10/02/2022     No results found for: CRPHS  Lab Results   Component Value Date    ALT 15 10/03/2022    AST 16 10/03/2022    ALKPHOS 58 10/03/2022    BILITOT 0.8 10/03/2022     Lab Results   Component Value Date/Time     10/06/2022 05:02 AM    K 3.9 10/06/2022 05:02 AM    K 4.0 10/04/2022 09:15 AM     10/06/2022 05:02 AM    CO2 25 10/06/2022 05:02 AM    BUN 11 10/06/2022 05:02 AM    CREATININE 0.8 10/06/2022 05:02 AM    CREATININE 0.9 10/05/2022 04:28 AM    CREATININE 0.9 10/04/2022 09:15 AM    GFRAA >60 10/06/2022 05:02 AM    LABGLOM >60 10/06/2022 05:02 AM    GLUCOSE 98 10/06/2022 05:02 AM    PROT 6.6 10/03/2022 04:20 AM    LABALBU 3.7 10/03/2022 04:20 AM    CALCIUM 9.1 10/06/2022 05:02 AM    BILITOT 0.8 10/03/2022 04:20 AM    ALKPHOS 58 10/03/2022 04:20 AM    AST 16 10/03/2022 04:20 AM    ALT 15 10/03/2022 04:20 AM     Lab Results   Component Value Date    CRP <0.1 04/23/2018     Lab Results   Component Value Date    SEDRATE 4 09/17/2020    SEDRATE 24 (H) 04/23/2018     Radiology:  Reviewed     Microbiology:   Blood cultures: negative so far  Urine culture: >100K E.coli (pansensitive)     ASSESSMENT:  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 I had a face to face encounter with the patient. Agree with exam.  Assessment and plan as outlined above and directed by me. Addition and corrections were done as deemed appropriate.  My exam and plan include: Patient is doing well and tolerating antibiotics. Going to the OR tomorrow.     Hudson Carvajal MD  10/9/2022  1:11 PM

## 2022-10-10 ENCOUNTER — ANESTHESIA EVENT (OUTPATIENT)
Dept: OPERATING ROOM | Age: 77
DRG: 330 | End: 2022-10-10
Payer: MEDICARE

## 2022-10-10 ENCOUNTER — ANESTHESIA (OUTPATIENT)
Dept: OPERATING ROOM | Age: 77
DRG: 330 | End: 2022-10-10
Payer: MEDICARE

## 2022-10-10 PROBLEM — R73.9 HYPERGLYCEMIA: Status: ACTIVE | Noted: 2022-10-10

## 2022-10-10 LAB
ABO/RH: NORMAL
ANION GAP SERPL CALCULATED.3IONS-SCNC: 7 MMOL/L (ref 7–16)
ANTIBODY SCREEN: NORMAL
BASOPHILS ABSOLUTE: 0.04 E9/L (ref 0–0.2)
BASOPHILS RELATIVE PERCENT: 0.6 % (ref 0–2)
BLOOD CULTURE, ROUTINE: NORMAL
BUN BLDV-MCNC: 12 MG/DL (ref 6–23)
CALCIUM SERPL-MCNC: 9.3 MG/DL (ref 8.6–10.2)
CHLORIDE BLD-SCNC: 102 MMOL/L (ref 98–107)
CO2: 28 MMOL/L (ref 22–29)
CREAT SERPL-MCNC: 0.9 MG/DL (ref 0.7–1.2)
CULTURE, BLOOD 2: NORMAL
EOSINOPHILS ABSOLUTE: 0.23 E9/L (ref 0.05–0.5)
EOSINOPHILS RELATIVE PERCENT: 3.4 % (ref 0–6)
GFR AFRICAN AMERICAN: >60
GFR NON-AFRICAN AMERICAN: >60 ML/MIN/1.73
GLUCOSE BLD-MCNC: 95 MG/DL (ref 74–99)
HCT VFR BLD CALC: 40.4 % (ref 37–54)
HEMOGLOBIN: 13.3 G/DL (ref 12.5–16.5)
IMMATURE GRANULOCYTES #: 0.03 E9/L
IMMATURE GRANULOCYTES %: 0.4 % (ref 0–5)
LYMPHOCYTES ABSOLUTE: 1.14 E9/L (ref 1.5–4)
LYMPHOCYTES RELATIVE PERCENT: 17.1 % (ref 20–42)
MAGNESIUM: 2.2 MG/DL (ref 1.6–2.6)
MCH RBC QN AUTO: 32.4 PG (ref 26–35)
MCHC RBC AUTO-ENTMCNC: 32.9 % (ref 32–34.5)
MCV RBC AUTO: 98.5 FL (ref 80–99.9)
MONOCYTES ABSOLUTE: 0.87 E9/L (ref 0.1–0.95)
MONOCYTES RELATIVE PERCENT: 13 % (ref 2–12)
NEUTROPHILS ABSOLUTE: 4.37 E9/L (ref 1.8–7.3)
NEUTROPHILS RELATIVE PERCENT: 65.5 % (ref 43–80)
PDW BLD-RTO: 12.3 FL (ref 11.5–15)
PHOSPHORUS: 2.8 MG/DL (ref 2.5–4.5)
PLATELET # BLD: 281 E9/L (ref 130–450)
PMV BLD AUTO: 9.2 FL (ref 7–12)
POTASSIUM SERPL-SCNC: 4.1 MMOL/L (ref 3.5–5)
RBC # BLD: 4.1 E12/L (ref 3.8–5.8)
SODIUM BLD-SCNC: 137 MMOL/L (ref 132–146)
WBC # BLD: 6.7 E9/L (ref 4.5–11.5)

## 2022-10-10 PROCEDURE — 0DNN4ZZ RELEASE SIGMOID COLON, PERCUTANEOUS ENDOSCOPIC APPROACH: ICD-10-PCS | Performed by: SURGERY

## 2022-10-10 PROCEDURE — 3600000009 HC SURGERY ROBOT BASE: Performed by: SURGERY

## 2022-10-10 PROCEDURE — S2900 ROBOTIC SURGICAL SYSTEM: HCPCS | Performed by: SURGERY

## 2022-10-10 PROCEDURE — 6370000000 HC RX 637 (ALT 250 FOR IP): Performed by: STUDENT IN AN ORGANIZED HEALTH CARE EDUCATION/TRAINING PROGRAM

## 2022-10-10 PROCEDURE — 36415 COLL VENOUS BLD VENIPUNCTURE: CPT

## 2022-10-10 PROCEDURE — 80048 BASIC METABOLIC PNL TOTAL CA: CPT

## 2022-10-10 PROCEDURE — C1758 CATHETER, URETERAL: HCPCS | Performed by: SURGERY

## 2022-10-10 PROCEDURE — 7100000001 HC PACU RECOVERY - ADDTL 15 MIN: Performed by: SURGERY

## 2022-10-10 PROCEDURE — 2500000003 HC RX 250 WO HCPCS

## 2022-10-10 PROCEDURE — 2580000003 HC RX 258: Performed by: STUDENT IN AN ORGANIZED HEALTH CARE EDUCATION/TRAINING PROGRAM

## 2022-10-10 PROCEDURE — 2500000003 HC RX 250 WO HCPCS: Performed by: CLINICAL NURSE SPECIALIST

## 2022-10-10 PROCEDURE — 2580000003 HC RX 258: Performed by: INTERNAL MEDICINE

## 2022-10-10 PROCEDURE — 0D1N4Z4 BYPASS SIGMOID COLON TO CUTANEOUS, PERCUTANEOUS ENDOSCOPIC APPROACH: ICD-10-PCS | Performed by: SURGERY

## 2022-10-10 PROCEDURE — 88307 TISSUE EXAM BY PATHOLOGIST: CPT

## 2022-10-10 PROCEDURE — 6360000002 HC RX W HCPCS: Performed by: INTERNAL MEDICINE

## 2022-10-10 PROCEDURE — 86850 RBC ANTIBODY SCREEN: CPT

## 2022-10-10 PROCEDURE — 83735 ASSAY OF MAGNESIUM: CPT

## 2022-10-10 PROCEDURE — 1200000000 HC SEMI PRIVATE

## 2022-10-10 PROCEDURE — C1769 GUIDE WIRE: HCPCS | Performed by: SURGERY

## 2022-10-10 PROCEDURE — 2500000003 HC RX 250 WO HCPCS: Performed by: SURGERY

## 2022-10-10 PROCEDURE — 8E0W4CZ ROBOTIC ASSISTED PROCEDURE OF TRUNK REGION, PERCUTANEOUS ENDOSCOPIC APPROACH: ICD-10-PCS | Performed by: STUDENT IN AN ORGANIZED HEALTH CARE EDUCATION/TRAINING PROGRAM

## 2022-10-10 PROCEDURE — 6360000002 HC RX W HCPCS: Performed by: STUDENT IN AN ORGANIZED HEALTH CARE EDUCATION/TRAINING PROGRAM

## 2022-10-10 PROCEDURE — 3700000000 HC ANESTHESIA ATTENDED CARE: Performed by: SURGERY

## 2022-10-10 PROCEDURE — 3600000019 HC SURGERY ROBOT ADDTL 15MIN: Performed by: SURGERY

## 2022-10-10 PROCEDURE — 3700000001 HC ADD 15 MINUTES (ANESTHESIA): Performed by: SURGERY

## 2022-10-10 PROCEDURE — 86901 BLOOD TYPING SEROLOGIC RH(D): CPT

## 2022-10-10 PROCEDURE — 99232 SBSQ HOSP IP/OBS MODERATE 35: CPT | Performed by: INTERNAL MEDICINE

## 2022-10-10 PROCEDURE — 6360000002 HC RX W HCPCS: Performed by: ANESTHESIOLOGY

## 2022-10-10 PROCEDURE — 0DTN4ZZ RESECTION OF SIGMOID COLON, PERCUTANEOUS ENDOSCOPIC APPROACH: ICD-10-PCS | Performed by: SURGERY

## 2022-10-10 PROCEDURE — 2720000010 HC SURG SUPPLY STERILE: Performed by: SURGERY

## 2022-10-10 PROCEDURE — 99232 SBSQ HOSP IP/OBS MODERATE 35: CPT | Performed by: NURSE PRACTITIONER

## 2022-10-10 PROCEDURE — 84100 ASSAY OF PHOSPHORUS: CPT

## 2022-10-10 PROCEDURE — 6360000002 HC RX W HCPCS

## 2022-10-10 PROCEDURE — 2709999900 HC NON-CHARGEABLE SUPPLY: Performed by: SURGERY

## 2022-10-10 PROCEDURE — 87081 CULTURE SCREEN ONLY: CPT

## 2022-10-10 PROCEDURE — 85025 COMPLETE CBC W/AUTO DIFF WBC: CPT

## 2022-10-10 PROCEDURE — 7100000000 HC PACU RECOVERY - FIRST 15 MIN: Performed by: SURGERY

## 2022-10-10 PROCEDURE — 2580000003 HC RX 258

## 2022-10-10 PROCEDURE — 88304 TISSUE EXAM BY PATHOLOGIST: CPT

## 2022-10-10 PROCEDURE — 6370000000 HC RX 637 (ALT 250 FOR IP): Performed by: INTERNAL MEDICINE

## 2022-10-10 PROCEDURE — 0T788DZ DILATION OF BILATERAL URETERS WITH INTRALUMINAL DEVICE, VIA NATURAL OR ARTIFICIAL OPENING ENDOSCOPIC: ICD-10-PCS | Performed by: STUDENT IN AN ORGANIZED HEALTH CARE EDUCATION/TRAINING PROGRAM

## 2022-10-10 PROCEDURE — 86900 BLOOD TYPING SEROLOGIC ABO: CPT

## 2022-10-10 RX ORDER — GLYCOPYRROLATE 0.2 MG/ML
INJECTION INTRAMUSCULAR; INTRAVENOUS PRN
Status: DISCONTINUED | OUTPATIENT
Start: 2022-10-10 | End: 2022-10-10 | Stop reason: SDUPTHER

## 2022-10-10 RX ORDER — DEXAMETHASONE SODIUM PHOSPHATE 4 MG/ML
INJECTION, SOLUTION INTRA-ARTICULAR; INTRALESIONAL; INTRAMUSCULAR; INTRAVENOUS; SOFT TISSUE PRN
Status: DISCONTINUED | OUTPATIENT
Start: 2022-10-10 | End: 2022-10-10 | Stop reason: SDUPTHER

## 2022-10-10 RX ORDER — OXYCODONE HYDROCHLORIDE 5 MG/1
10 TABLET ORAL EVERY 4 HOURS PRN
Status: DISCONTINUED | OUTPATIENT
Start: 2022-10-10 | End: 2022-10-13 | Stop reason: HOSPADM

## 2022-10-10 RX ORDER — NEOSTIGMINE METHYLSULFATE 1 MG/ML
INJECTION, SOLUTION INTRAVENOUS PRN
Status: DISCONTINUED | OUTPATIENT
Start: 2022-10-10 | End: 2022-10-10 | Stop reason: SDUPTHER

## 2022-10-10 RX ORDER — LIDOCAINE HYDROCHLORIDE 20 MG/ML
INJECTION, SOLUTION EPIDURAL; INFILTRATION; INTRACAUDAL; PERINEURAL PRN
Status: DISCONTINUED | OUTPATIENT
Start: 2022-10-10 | End: 2022-10-10 | Stop reason: SDUPTHER

## 2022-10-10 RX ORDER — BUPIVACAINE HYDROCHLORIDE AND EPINEPHRINE 2.5; 5 MG/ML; UG/ML
INJECTION, SOLUTION EPIDURAL; INFILTRATION; INTRACAUDAL; PERINEURAL PRN
Status: DISCONTINUED | OUTPATIENT
Start: 2022-10-10 | End: 2022-10-10 | Stop reason: ALTCHOICE

## 2022-10-10 RX ORDER — LIDOCAINE HYDROCHLORIDE ANHYDROUS AND DEXTROSE MONOHYDRATE .4; 5 G/100ML; G/100ML
2 INJECTION, SOLUTION INTRAVENOUS CONTINUOUS
Status: DISCONTINUED | OUTPATIENT
Start: 2022-10-10 | End: 2022-10-11

## 2022-10-10 RX ORDER — ONDANSETRON 2 MG/ML
INJECTION INTRAMUSCULAR; INTRAVENOUS PRN
Status: DISCONTINUED | OUTPATIENT
Start: 2022-10-10 | End: 2022-10-10 | Stop reason: SDUPTHER

## 2022-10-10 RX ORDER — OXYCODONE HYDROCHLORIDE 5 MG/1
5 TABLET ORAL EVERY 4 HOURS PRN
Status: DISCONTINUED | OUTPATIENT
Start: 2022-10-10 | End: 2022-10-13 | Stop reason: HOSPADM

## 2022-10-10 RX ORDER — EPHEDRINE SULFATE/0.9% NACL/PF 50 MG/5 ML
SYRINGE (ML) INTRAVENOUS PRN
Status: DISCONTINUED | OUTPATIENT
Start: 2022-10-10 | End: 2022-10-10 | Stop reason: SDUPTHER

## 2022-10-10 RX ORDER — MIDAZOLAM HYDROCHLORIDE 1 MG/ML
INJECTION INTRAMUSCULAR; INTRAVENOUS PRN
Status: DISCONTINUED | OUTPATIENT
Start: 2022-10-10 | End: 2022-10-10 | Stop reason: SDUPTHER

## 2022-10-10 RX ORDER — PROPOFOL 10 MG/ML
INJECTION, EMULSION INTRAVENOUS PRN
Status: DISCONTINUED | OUTPATIENT
Start: 2022-10-10 | End: 2022-10-10 | Stop reason: SDUPTHER

## 2022-10-10 RX ORDER — SODIUM CHLORIDE, SODIUM LACTATE, POTASSIUM CHLORIDE, CALCIUM CHLORIDE 600; 310; 30; 20 MG/100ML; MG/100ML; MG/100ML; MG/100ML
INJECTION, SOLUTION INTRAVENOUS CONTINUOUS
Status: DISCONTINUED | OUTPATIENT
Start: 2022-10-10 | End: 2022-10-13

## 2022-10-10 RX ORDER — ROCURONIUM BROMIDE 10 MG/ML
INJECTION, SOLUTION INTRAVENOUS PRN
Status: DISCONTINUED | OUTPATIENT
Start: 2022-10-10 | End: 2022-10-10 | Stop reason: SDUPTHER

## 2022-10-10 RX ORDER — KETAMINE HYDROCHLORIDE 10 MG/ML
INJECTION, SOLUTION INTRAMUSCULAR; INTRAVENOUS PRN
Status: DISCONTINUED | OUTPATIENT
Start: 2022-10-10 | End: 2022-10-10 | Stop reason: SDUPTHER

## 2022-10-10 RX ORDER — FENTANYL CITRATE 50 UG/ML
INJECTION, SOLUTION INTRAMUSCULAR; INTRAVENOUS PRN
Status: DISCONTINUED | OUTPATIENT
Start: 2022-10-10 | End: 2022-10-10 | Stop reason: SDUPTHER

## 2022-10-10 RX ORDER — KETOROLAC TROMETHAMINE 30 MG/ML
INJECTION, SOLUTION INTRAMUSCULAR; INTRAVENOUS PRN
Status: DISCONTINUED | OUTPATIENT
Start: 2022-10-10 | End: 2022-10-10 | Stop reason: SDUPTHER

## 2022-10-10 RX ORDER — ACETAMINOPHEN 500 MG
1000 TABLET ORAL EVERY 8 HOURS SCHEDULED
Status: DISCONTINUED | OUTPATIENT
Start: 2022-10-10 | End: 2022-10-13 | Stop reason: HOSPADM

## 2022-10-10 RX ORDER — SODIUM CHLORIDE 9 MG/ML
INJECTION, SOLUTION INTRAVENOUS CONTINUOUS PRN
Status: DISCONTINUED | OUTPATIENT
Start: 2022-10-10 | End: 2022-10-10 | Stop reason: SDUPTHER

## 2022-10-10 RX ORDER — INDOCYANINE GREEN AND WATER 25 MG
KIT INJECTION PRN
Status: DISCONTINUED | OUTPATIENT
Start: 2022-10-10 | End: 2022-10-10 | Stop reason: ALTCHOICE

## 2022-10-10 RX ADMIN — DEXAMETHASONE SODIUM PHOSPHATE 10 MG: 4 INJECTION, SOLUTION INTRAMUSCULAR; INTRAVENOUS at 15:58

## 2022-10-10 RX ADMIN — SODIUM CHLORIDE, POTASSIUM CHLORIDE, SODIUM LACTATE AND CALCIUM CHLORIDE: 600; 310; 30; 20 INJECTION, SOLUTION INTRAVENOUS at 04:26

## 2022-10-10 RX ADMIN — SERTRALINE HYDROCHLORIDE 50 MG: 50 TABLET ORAL at 08:06

## 2022-10-10 RX ADMIN — SODIUM CHLORIDE: 9 INJECTION, SOLUTION INTRAVENOUS at 15:54

## 2022-10-10 RX ADMIN — SODIUM CHLORIDE, PRESERVATIVE FREE 10 ML: 5 INJECTION INTRAVENOUS at 20:00

## 2022-10-10 RX ADMIN — WATER 2000 MG: 1 INJECTION INTRAMUSCULAR; INTRAVENOUS; SUBCUTANEOUS at 08:05

## 2022-10-10 RX ADMIN — ROCURONIUM BROMIDE 50 MG: 10 INJECTION, SOLUTION INTRAVENOUS at 15:54

## 2022-10-10 RX ADMIN — PROPOFOL 100 MG: 10 INJECTION, EMULSION INTRAVENOUS at 15:54

## 2022-10-10 RX ADMIN — Medication 10 MG: at 16:13

## 2022-10-10 RX ADMIN — ONDANSETRON 4 MG: 2 INJECTION INTRAMUSCULAR; INTRAVENOUS at 19:59

## 2022-10-10 RX ADMIN — ACETAMINOPHEN 1000 MG: 500 TABLET ORAL at 22:11

## 2022-10-10 RX ADMIN — LIDOCAINE HYDROCHLORIDE 2 MG/MIN: 4 INJECTION, SOLUTION INTRAVENOUS at 16:03

## 2022-10-10 RX ADMIN — SODIUM CHLORIDE: 9 INJECTION, SOLUTION INTRAVENOUS at 17:02

## 2022-10-10 RX ADMIN — FENTANYL CITRATE 100 MCG: 50 INJECTION, SOLUTION INTRAMUSCULAR; INTRAVENOUS at 15:54

## 2022-10-10 RX ADMIN — OXYCODONE HYDROCHLORIDE 10 MG: 5 TABLET ORAL at 22:19

## 2022-10-10 RX ADMIN — ATORVASTATIN CALCIUM 10 MG: 10 TABLET, FILM COATED ORAL at 22:10

## 2022-10-10 RX ADMIN — KETOROLAC TROMETHAMINE 30 MG: 30 INJECTION, SOLUTION INTRAMUSCULAR at 18:10

## 2022-10-10 RX ADMIN — LIDOCAINE HYDROCHLORIDE 100 MG: 20 INJECTION, SOLUTION EPIDURAL; INFILTRATION; INTRACAUDAL; PERINEURAL at 15:54

## 2022-10-10 RX ADMIN — KETAMINE HYDROCHLORIDE 50 MG: 10 INJECTION INTRAMUSCULAR; INTRAVENOUS at 15:54

## 2022-10-10 RX ADMIN — METRONIDAZOLE 500 MG: 500 INJECTION, SOLUTION INTRAVENOUS at 03:06

## 2022-10-10 RX ADMIN — GLYCOPYRROLATE 0.6 MG: 0.2 INJECTION INTRAMUSCULAR; INTRAVENOUS at 18:17

## 2022-10-10 RX ADMIN — METRONIDAZOLE 500 MG: 500 INJECTION, SOLUTION INTRAVENOUS at 09:57

## 2022-10-10 RX ADMIN — ONDANSETRON 4 MG: 2 INJECTION INTRAMUSCULAR; INTRAVENOUS at 17:18

## 2022-10-10 RX ADMIN — SODIUM CHLORIDE, PRESERVATIVE FREE 10 ML: 5 INJECTION INTRAVENOUS at 08:06

## 2022-10-10 RX ADMIN — Medication 3 MG: at 18:17

## 2022-10-10 RX ADMIN — FENTANYL CITRATE 25 MCG: 50 INJECTION, SOLUTION INTRAMUSCULAR; INTRAVENOUS at 17:36

## 2022-10-10 RX ADMIN — MIDAZOLAM 2 MG: 1 INJECTION INTRAMUSCULAR; INTRAVENOUS at 15:47

## 2022-10-10 RX ADMIN — Medication 10 MG: at 16:16

## 2022-10-10 ASSESSMENT — PAIN DESCRIPTION - DESCRIPTORS: DESCRIPTORS: ACHING;DISCOMFORT;SORE;TENDER

## 2022-10-10 ASSESSMENT — PAIN SCALES - GENERAL
PAINLEVEL_OUTOF10: 9
PAINLEVEL_OUTOF10: 7
PAINLEVEL_OUTOF10: 0

## 2022-10-10 ASSESSMENT — PAIN DESCRIPTION - PAIN TYPE: TYPE: ACUTE PAIN;SURGICAL PAIN

## 2022-10-10 ASSESSMENT — PAIN DESCRIPTION - ORIENTATION: ORIENTATION: MID

## 2022-10-10 ASSESSMENT — PAIN - FUNCTIONAL ASSESSMENT: PAIN_FUNCTIONAL_ASSESSMENT: ACTIVITIES ARE NOT PREVENTED

## 2022-10-10 ASSESSMENT — PAIN DESCRIPTION - FREQUENCY: FREQUENCY: CONTINUOUS

## 2022-10-10 ASSESSMENT — PAIN DESCRIPTION - LOCATION: LOCATION: ABDOMEN;BACK

## 2022-10-10 ASSESSMENT — PAIN DESCRIPTION - ONSET: ONSET: ON-GOING

## 2022-10-10 NOTE — CARE COORDINATION
Updated plan of care. NPO for hemicolectomy/poss open ostomy. Iv fluids. ID following. Referral called to [address].  Will continue to follow-mjo

## 2022-10-10 NOTE — ANESTHESIA PRE PROCEDURE
Department of Anesthesiology  Preprocedure Note       Name:  Mahnaz Serrano   Age:  78 y.o.  :  1945                                          MRN:  [id number]         Date:  10/10/2022      Surgeon: Gopi Walker):  Samantha Scott MD    Procedure: Procedure(s):  LAPAROSCOPIC ROBOTIC XI ASSISTED LEFT COLON RESECTION POSSIBLE OPEN   (STAFF FROM 3)   (DR AVAIL. AT 1330)    Medications prior to admission:   Prior to Admission medications    Medication Sig Start Date End Date Taking?  Authorizing Provider   sertraline (ZOLOFT) 50 MG tablet Take 50 mg by mouth daily   Yes Historical Provider, MD   simvastatin (ZOCOR) 10 MG tablet Take 2 tablets by mouth nightly 20   Auther Litten, MD   EPINEPHrine (EPIPEN 2-ALTA) 0.3 MG/0.3ML SOAJ injection Inject 0.3 mLs into the muscle once for 1 dose Use as directed for allergic reaction 20  Delbert Guerrier DO   magnesium oxide (MAG-OX) 400 MG tablet Take 400 mg by mouth daily    Historical Provider, MD   Apple Cider Vinegar 500 MG TABS Take by mouth daily    Historical Provider, MD   Potassium 99 MG TABS Take by mouth at bedtime    Historical Provider, MD   aspirin 81 MG tablet Take 81 mg by mouth daily Ld 2019 per dr. Eleonora Solorzano Provider, MD   Omega-3 Fatty Acids (FISH OIL) 1000 MG CAPS Take 1,000 mg by mouth daily Ld 2019    Historical Provider, MD   b complex vitamins capsule Take 1 capsule by mouth daily Ld 2019    Historical Provider, MD       Current medications:    Current Facility-Administered Medications   Medication Dose Route Frequency Provider Last Rate Last Admin    lactated ringers infusion   IntraVENous Continuous Mariel Osuna  mL/hr at 10/10/22 0426 New Bag at 10/10/22 0426    metronidazole (FLAGYL) 500 mg in 0.9% NaCl 100 mL IVPB premix  500 mg IntraVENous Q8H ARMINDA Lord - CNS   Stopped at 10/10/22 0406    sertraline (ZOLOFT) tablet 50 mg  50 mg Oral Daily Momo Hadley MD   50 mg at 10/10/22 6861    atorvastatin (LIPITOR) tablet 10 mg  10 mg Oral Daily Brett So MD   10 mg at 10/09/22 2037    polyethylene glycol (GLYCOLAX) packet 17 g  17 g Oral Once NIGEL Durán        sodium chloride (OCEAN, BABY AYR) 0.65 % nasal spray 1 spray  1 spray Each Nostril Q4H PRN Chandu Russell, APRN - CNP   1 spray at 10/05/22 0106    morphine (PF) injection 2 mg  2 mg IntraVENous Q4H PRN Tej Vail MD        cefTRIAXone (ROCEPHIN) 2,000 mg in sterile water 20 mL IV syringe  2,000 mg IntraVENous Q24H Brett So MD   2,000 mg at 10/10/22 0805    sodium chloride flush 0.9 % injection 5-40 mL  5-40 mL IntraVENous 2 times per day Tej Vail MD   10 mL at 10/10/22 0806    sodium chloride flush 0.9 % injection 5-40 mL  5-40 mL IntraVENous PRN Tej Vail MD        0.9 % sodium chloride infusion   IntraVENous PRN Tej Vail MD        enoxaparin (LOVENOX) injection 40 mg  40 mg SubCUTAneous Daily Tej Vail MD   40 mg at 10/09/22 0916    ondansetron (ZOFRAN-ODT) disintegrating tablet 4 mg  4 mg Oral Q8H PRN Tej Vail MD        Or    ondansetron [facility]) injection 4 mg  4 mg IntraVENous Q6H PRN Tej Vail MD   4 mg at 10/09/22 2037    polyethylene glycol (GLYCOLAX) packet 17 g  17 g Oral Daily PRN Tej Vail MD        acetaminophen (TYLENOL) tablet 650 mg  650 mg Oral Q6H PRN Tej Vail MD   650 mg at 10/09/22 7227    Or    acetaminophen (TYLENOL) suppository 650 mg  650 mg Rectal Q6H PRN Tej Vail MD           Allergies: Allergies   Allergen Reactions    Ciprofloxacin Anaphylaxis    Penicillins Rash    Bactrim [Sulfamethoxazole-Trimethoprim] Other (See Comments)     ?        Problem List:    Patient Active Problem List   Diagnosis Code    History of prostate cancer Z85.46    Diverticulosis K57.90    History of stroke Z86.73    Hyperlipidemia E78.5    Colonic diverticular abscess K57.20    Diverticulitis of colon K57.32    Moderate protein-calorie malnutrition ([facility] [address] E44.0    Hallux rigidus of left foot M20.22    Post-op pain G89.18    Nonrheumatic aortic valve stenosis I35.0    Muscle cramps at night R25.2    Essential hypertension, benign I10    Heart murmur R01.1    Impotence of organic origin N52.9    Disorder of prostate N42.9    Diverticulitis K57.92    Acute cystitis without hematuria N30.00    Pyelonephritis N12    Colovesical fistula N32.1       Past Medical History:        Diagnosis Date    Arthritis     Benign prostatic hyperplasia     Diverticulosis     Erectile dysfunction     Impotence of organic origin    GERD (gastroesophageal reflux disease)     Heart murmur     History of prostate cancer     prostate- disorder of prostate    History of stroke 2003    l eye    Hypercholesterolemia     Hyperlipidemia     MVP (mitral valve prolapse)        Past Surgical History:        Procedure Laterality Date    ARTHRODESIS Left 1/11/2019    LEFT FOOT ARTHRODESIS 1ST METATARSOPHALANGEAL JOINT performed by Nayan Dao DPM at [facility]    l lump neg    COLONOSCOPY  2012    4/18/2016,9/19/2007,2/14/2011    HERNIA REPAIR  1962    r inguinal hydrocele also    KNEE ARTHROSCOPY Left     LYMPH NODE BIOPSY  1965    neg    PROSTATECTOMY  [id number]    LAP ROBOTIC    UPPER GASTROINTESTINAL ENDOSCOPY  2016       Social History:    Social History     Tobacco Use    Smoking status: Never    Smokeless tobacco: Never   Substance Use Topics    Alcohol use: Yes     Comment: 2 x month                                Counseling given: Not Answered      Vital Signs (Current):   Vitals:    10/09/22 0058 10/09/22 0712 10/09/22 2030 10/10/22 0715   BP: 127/68 129/74 (!) 157/81 120/60   Pulse: 54 52 59 60   Resp: 16 16 16 16   Temp: 37 °C (98.6 °F) 36.7 °C (98 °F) 36.4 °C (97.5 °F) 37 °C (98.6 °F)   TempSrc: Oral Oral Oral Oral   SpO2: 99% 98% 99% 97%   Weight:       Height: BP Readings from Last 3 Encounters:   10/10/22 120/60   12/08/20 136/86   09/17/20 (!) 150/90       NPO Status:                                                                                 BMI:   Wt Readings from Last 3 Encounters:   10/02/22 156 lb (70.8 kg)   12/08/20 165 lb (74.8 kg)   09/17/20 165 lb (74.8 kg)     Body mass index is 26.78 kg/m². CBC:   Lab Results   Component Value Date/Time    WBC 6.7 10/10/2022 03:28 AM    RBC 4.10 10/10/2022 03:28 AM    HGB 13.3 10/10/2022 03:28 AM    HCT 40.4 10/10/2022 03:28 AM    MCV 98.5 10/10/2022 03:28 AM    RDW 12.3 10/10/2022 03:28 AM     10/10/2022 03:28 AM       CMP:   Lab Results   Component Value Date/Time     10/10/2022 03:28 AM    K 4.1 10/10/2022 03:28 AM    K 4.0 10/04/2022 09:15 AM     10/10/2022 03:28 AM    CO2 28 10/10/2022 03:28 AM    BUN 12 10/10/2022 03:28 AM    CREATININE 0.9 10/10/2022 03:28 AM    GFRAA >60 10/10/2022 03:28 AM    LABGLOM >60 10/10/2022 03:28 AM    GLUCOSE 95 10/10/2022 03:28 AM    PROT 6.6 10/03/2022 04:20 AM    CALCIUM 9.3 10/10/2022 03:28 AM    BILITOT 0.8 10/03/2022 04:20 AM    ALKPHOS 58 10/03/2022 04:20 AM    AST 16 10/03/2022 04:20 AM    ALT 15 10/03/2022 04:20 AM       POC Tests: No results for input(s): POCGLU, POCNA, POCK, POCCL, POCBUN, POCHEMO, POCHCT in the last 72 hours. Coags:   Lab Results   Component Value Date/Time    PROTIME 16.0 04/09/2018 09:20 AM    INR 1.4 04/09/2018 09:20 AM    APTT 28.2 08/28/2013 12:00 PM       HCG (If Applicable): No results found for: PREGTESTUR, PREGSERUM, HCG, HCGQUANT     ABGs: No results found for: PHART, PO2ART, HIV9EKK, HAR5KPT, BEART, W2HGYSHN     Type & Screen (If Applicable):  No results found for: LABABO, LABRH    Drug/Infectious Status (If Applicable):  No results found for: HIV, HEPCAB    COVID-19 Screening (If Applicable): No results found for: COVID19     10/2/2022 CT abdomen:   The lung bases demonstrate atelectasis. Artelia Raisin is normal.  Gallbladder is   partially distended with gallstones.  Spleen, pancreas, the adrenals and the   right kidney are normal.  There is hydronephrosis and edema in the left   kidney with distended and thickened ureter with inflammatory changes   surrounding the renal hilum and ureter.  A 4 cm cystic lesion in the left   kidney is noted.  Degenerative changes are identified in the lumbar spine.       Pelvis.  Bladder is collapsed with the wall thickening.  There is   diverticulosis of the colon with focal thickening of the sigmoid colon.  A   fistula is noted between the sigmoid colon in the bladder which may be   patent.  There is constipation with retained fecal matter in the colon.  The   appendix is normal.     8/07/2022 EKG 12 Lead:  Sinus  Bradycardia  - occasional PAC     # PACs = 1.  WITHIN NORMAL LIMITS      Anesthesia Evaluation  Patient summary reviewed and Nursing notes reviewed  Airway: Mallampati: III  TM distance: >3 FB   Neck ROM: limited  Comment: Degenerative arthritis of the neck  Mouth opening: > = 3 FB   Dental:          Pulmonary:normal exam  breath sounds clear to auscultation                             Cardiovascular:  Exercise tolerance: good (>4 METS),   (+) hypertension:, past MI:, murmur: Grade 2, Mitral, hyperlipidemia      ECG reviewed  Rhythm: regular  Rate: normal           Beta Blocker:  Not on Beta Blocker      ROS comment: Aortic valve stenosis, essential HTN, MVP,      Neuro/Psych:   (+) CVA:,             GI/Hepatic/Renal:   (+) GERD:, renal disease:,          ROS comment: Diagnosis: Fistula of intestine, excluding rectum and anus   Hx: pyelonephritis, acute cystitis. Endo/Other:    (+) malignancy/cancer. ROS comment: Prostate cancer 2013 with prostatectomy Abdominal:       Abdomen: soft. Bowel sounds: absent. Vascular:           Other Findings: NPO since 10/9/2022 @1500          Anesthesia Plan      general     ASA 4 Induction: intravenous. Anesthetic plan and risks discussed with patient. Use of blood products discussed with patient whom consented to blood products. Plan discussed with attending.                     Candida Mercado RN   10/10/2022

## 2022-10-10 NOTE — PROGRESS NOTES
GENERAL SURGERY  DAILY PROGRESS NOTE  10/10/2022  Chief Complaint   Patient presents with    Abdominal Pain     Has fistula between bowl and bladder    Fever     102.4 for ems        Subjective:  Doing well. Ready for surgery. Tolerated mechanical prep. Afebrile overnight. Objective:  /60   Pulse 60   Temp 98.6 °F (37 °C) (Oral)   Resp 16   Ht 5' 4\" (1.626 m)   Wt 156 lb (70.8 kg)   SpO2 99%   BMI 26.78 kg/m²     In: -   Out: 77 [Urine:77]    General appearance: NAD, appears stated age  Lungs: Equal chest rise bilaterally, no retractions, no audible wheezing  Heart: warm throughout  Abdomen: soft, NTTP throughout, ND  Skin: no obvious rashes or lesions appreciated    CBC x3  Recent Labs     10/10/22  0328   WBC 6.7   RBC 4.10   HGB 13.3   HCT 40.4   MCV 98.5   MCH 32.4   MCHC 32.9   RDW 12.3      MPV 9.2       BMP x3  Recent Labs     10/10/22  0328      K 4.1      CO2 28   BUN 12   CREATININE 0.9   GLUCOSE 95   CALCIUM 9.3         Assessment/Plan:  78 y.o. 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      - or for lap robo assisted L hemicolectomy, poss open poss ostomy today  - npo  - ivf  - t&s  - ID recommendations    Brielle Kohli MD  General Surgery Resident, PGY-4    Electronically signed on 10/10/2022 at 7:34 AM

## 2022-10-10 NOTE — PROGRESS NOTES
Nursing Transfer Note    Data:  Summary of patients progress: Dr Virgen Steiner lap robotic assist colon resection  Reason for transfer: next level of care    Action:  Explained reason for transfer to Patient. Report given to: Inna Ayala, using RN Handoff Navigator.   Mode of transportation: bed    Response:  RN Recommendations: pain mgt Spoke with Leticia from Home health care about medications.  DR. LEÓN Kent wants him to stay on the medication dosages prior to hospitalization.  12.5 mg metoprolol   4mg cardura     Also, verified dosages with patients wife.     Jo Ann Corbin

## 2022-10-10 NOTE — OP NOTE
Operative Note      Patient: Conchita Vazquez  YOB: 1945  MRN: [id number]    Date of Procedure: 10/10/2022    Pre-Op Diagnosis: Colovesicular fistula    Post-Op Diagnosis:  Same, with chronic smoldering sigmoid diverticulitis       Procedure(s):  LAPAROSCOPIC ROBOTIC XI ASSISTED LEFT COLON RESECTION with primary end-to-end stapled anastomosis  Mobilization of splenic flexure  Extensive lysis of adhesions  CYSTOSCOPY EXTERNAL URETERAL STENT INSERTION    Surgeon(s):  MD Kahlil Wang MD    Assistant:   Resident: Uli Cuba MD, Del Lujan DO    Anesthesia: General    Estimated Blood Loss (mL): less than 703     Complications: None    Specimens:   ID Type Source Tests Collected by Time Destination   A : sigmoid colon Tissue Tissue SURGICAL PATHOLOGY Ruth Naranjo MD 10/10/2022 1746    B : DONUTS  Tissue Tissue SURGICAL PATHOLOGY Ruth Naranjo MD 10/10/2022 1746        Implants:  * No implants in log *      Drains:   Urinary Catheter 10/10/22 2 Way (Active)       [REMOVED] Ureteral Drain/Stent 10/10/22 Right Ureter (Removed)       [REMOVED] Ureteral Drain/Stent 10/10/22 Left Ureter (Removed)       Findings: Extensive adhesions of the sigmoid colon to the anterior abdominal wall and left pelvic sidewall, bilateral ureters were protected during dissection with IC-Green fluoroscopy, robot assisted sigmoidectomy with end and descending colon to rectal stapled anastomosis using 29 EEA stapler, good donuts and leak test negative    Detailed Description of Procedure: The patient was brought to the operating room and positioned supine on the operating room table. Sequential compression devices were placed on the patient's lower extremities and were powered on. General anesthesia was administered and preoperative antibiotics were administered per the anesthetic record.  The patient was prepped and draped in the usual sterile fashion and the procedure went forth with strict and lower pelvic structures to the anterior pelvic wall. Proceeding with a combination of blunt dissection, the vessel sealer, and sharp dissection with robot scissors we were able to confirm localization of the course of the left ureter and protected from our dissection field while taking the mass of chronically inflamed and scattered diverticula of the sigmoid colon from the anterior pelvic wall. This took the majority of the case. We proceeded in a lateral to medial fashion in order to pay particular attention to the course of the ureter. While we certainly cause serosal tears of the sigmoid colon at this level in the course of her dissection we did not cause any perforation. The fistula we felt was contained within this mass of adhesions and after completely mobilizing the segment we had no obvious perforation or evidence of mucosa suggesting a bladder injury. Given our mobilization attempts at the pelvis we were able to visualize the rectum and sigmoid colon and's entirety. We were then able to proceed with dissection of the medial aspect of the sigmoid colon mesentery. Prior to performing this however we had issues with containing the small bowel cephalad in order to allow for adequate visualization of the pelvic structures in order to protect them during the course of our dissection. Therefore we released some adhesions of the small bowel to the right lower pelvic brim with the cut function of the vessel sealer. Once this was performed we were able to place a small bowel cephalad so that we could continue our dissection safely. Again using IC-Green to confirm the course of the ureters we were able to proceed with dissection of the medial aspect of the mesentery of the sigmoid colon at the diseased segment. We proceeded with incising the peritoneum sharply and dissecting with blunt dissection as well as with the vessel sealer cephalad and caudad.   Unfortunately during the course of this dissection we encountered severely calcified vessels which fed the sigmoid colon. These were not readily amenable to hemorrhage control with the vessel sealer so large clips were applied at these areas. Once the mesentery of the sigmoid colon had been taken we proceeded with excision of the diseased segment of sigmoid colon. Using the robot stapler and a green load we were able to come across the rectosigmoid junction after clearing the mesorectum posteriorly and the epiploica on either side of the rectal stump. We ultimately used 2 green loads in order to safely come across this area and in order to visualize the critical structures. Once this was done we were able to lift the sigmoid colon anteriorly and determine whether or not we had enough length for a end-to-end stapled anastomosis. Inspection revealed that we likely had adequate length so we proceeded with undocking the robot and extending the 8 mm supraumbilical vertical incision with a 15 scalpel blade. Electrocautery was used to come through the dermis and subcutaneous tissues and ultimately the fascia of this incision. Once matured we were able to place a wound protector there. Prior to undocking the robot we were able to under direct visualization use a grasper to identify where the distalmost aspect of the now stapled diseased sigmoid colon so that we could deliver it through the wound protector. Once brought up through the wound we were able to identify an area of proximal sigmoid colon/descending colon which appeared amenable to anastomosis. Particularly the segment was disease-free without diverticula and the bowel felt soft with soft associated mesentery. We used a clamp to come across the mesentery at this level and divided the distal mesentery using electrocautery. We then use a silk suture in order to control the mesenteric bleeding from the clamp. We used curved Anders scissors in order to come across the colon at this level. This freed our specimen and we passed off the field for pathologic examination. Using a 2-0 Prolene suture, we secured the 29 EEA anvil such that the anvil was protruding through the lumen of the distal aspect of the descending colon. Once this was secured we dunked the specimen into the abdomen and use the gel point device to cover the wound protector. We reinsufflated the abdomen. Using laparoscopic graspers we position the descending colon segment with the anvil secured in its anatomic position make sure that the mesentery was not twisted. We had adequate length after confirming from below that the rectal stump was long enough to facilitate anastomosis without undue tension. We introduced a 29 EEA stapler after serial dilation of the rectal stump and slowly under direct visualization deployed the pin anterior to the staple line. We attached the anvil to the pin using graspers again making sure that the mesentery was not twisted of the descending colon. There was good matching of tissue without mesentery within the descending colon and rectum which would be incorporated in the end and stapled anastomosis. We closed the EEA stapler and fired. The stapler was removed and the donuts were inspected and were noted to be of good quality. Pelvis was instilled with saline such that the anastomosis was completely covered and air was instilled into the rectum using an Asepto. There is no bubbling showing that there was no anastomotic leak. Using a grasper and gently pulling the epiploica of the descending colon caudad revealed that there was no undue tension and some leeway for a tension-free anastomosis. Blood products were suctioned from the pelvis. Hemostasis was confirmed. We then removed our laparoscopic instruments and the wound protector and desufflated the abdomen. We proceeded with closure.     The midline incision was closed with x2 0 looped PDS sutures running superiorly and inferiorly and meeting in the middle and tying it there. The wound was irrigated and then closed in subcuticular fashion using 4-0 Monocryl. The remainder of the laparoscopic incisions were closed with 4 Monocryl in a subcuticular fashion. Dermabond was applied to all incisions. Counts were correct x2. At the conclusion of the procedure the Lao catheter remained however the ureteral stents were removed. The patient was awoken from anesthesia and brought to the PACU in stable condition. Dr. Shandra Hill was present for this procedure.       Electronically signed by Bolivar White MD on 10/10/2022 at 6:24 PM

## 2022-10-10 NOTE — PROGRESS NOTES
[facility] Progress Note    Admitting Date and Time: 10/2/2022  4:51 PM  Admit Dx: Diverticulitis [K57.92]  Diverticulitis of colon [K57.32]  Acute cystitis without hematuria [N30.00]    Subjective:  Patient is being followed for Diverticulitis [K57.92]  Diverticulitis of colon [K57.32]  Acute cystitis without hematuria [N30.00]     Patient seen sitting up in chair. He is alert and in no distress. Denies abdominal pain at this time. Patient n.p.o. for IR today. No acute issues at this time. ROS: denies fever, chills, cp, sob, n/v, HA unless stated above.       metroNIDAZOLE  500 mg IntraVENous Q8H    sertraline  50 mg Oral Daily    atorvastatin  10 mg Oral Daily    polyethylene glycol  17 g Oral Once    cefTRIAXone (ROCEPHIN) IV  2,000 mg IntraVENous Q24H    sodium chloride flush  5-40 mL IntraVENous 2 times per day    enoxaparin  40 mg SubCUTAneous Daily     sodium chloride, 1 spray, Q4H PRN  morphine, 2 mg, Q4H PRN  sodium chloride flush, 5-40 mL, PRN  sodium chloride, , PRN  ondansetron, 4 mg, Q8H PRN   Or  ondansetron, 4 mg, Q6H PRN  polyethylene glycol, 17 g, Daily PRN  acetaminophen, 650 mg, Q6H PRN   Or  acetaminophen, 650 mg, Q6H PRN         Objective:    BP (!) 155/69   Pulse 58   Temp 97.9 °F (36.6 °C) (Oral)   Resp 16   Ht 5' 4\" (1.626 m)   Wt 156 lb (70.8 kg)   SpO2 98%   BMI 26.78 kg/m²     General Appearance: alert and oriented to person, place and time and in no acute distress  Skin: warm and dry  Head: normocephalic and atraumatic  Eyes: pupils equal, round, and reactive to light, extraocular eye movements intact, conjunctivae normal  Neck: neck supple and non tender without mass   Pulmonary/Chest: clear to auscultation bilaterally- no wheezes, rales or rhonchi, normal air movement, no respiratory distress  Cardiovascular: normal rate, normal S1 and S2 and no carotid bruits  Abdomen: soft, non-tender, non-distended, normal bowel sounds, no masses or organomegaly  Extremities: no cyanosis, no clubbing and no edema  Neurologic: no cranial nerve deficit and speech normal        Recent Labs     10/10/22  0328      K 4.1      CO2 28   BUN 12   CREATININE 0.9   GLUCOSE 95   CALCIUM 9.3       Recent Labs     10/10/22  0328   WBC 6.7   RBC 4.10   HGB 13.3   HCT 40.4   MCV 98.5   MCH 32.4   MCHC 32.9   RDW 12.3      MPV 9.2           Assessment:    Principal Problem:    Diverticulitis  Active Problems:    Acute cystitis without hematuria    Pyelonephritis    Colovesical fistula  Resolved Problems:    * No resolved hospital problems. *      Plan:  Acute diverticulitis: Presented to the ED with complaints of nausea, dry heaves, fever. CT shows uncomplicated diverticulitis, colovesicular fistula with significantly thickened and inflamed urinary bladder concerning for cystitis and possibly extending inflammation to involve left ureter and pelvis. Continue IVF's. Continue Rocephin and Flagyl-General surgery and ID input appreciated  Colovesicular fistula: Secondary to recurrent episodes of diverticulitis. Remains n.p.o. for OR today for laparoscopic left hemicolectomy with possible ostomy creation. Pyelonephritis: Secondary to acute cystitis. Afebrile. UC positive for E. Coli. Continue Rocephin- ID following. HLD: Hx CVA, continue Lipitor and ASA    In review of EMR, valuation, management, and diagnosis. Care plan has been discussed with attending. Time spent 25 minutes. NOTE: This report was transcribed using voice recognition software. Every effort was made to ensure accuracy; however, inadvertent computerized transcription errors may be present.   Electronically signed by ARMINDA Beaver CNP on 10/10/2022 at 12:42 PM  HOSPITALIST ATTENDING PHYSICIAN NOTE 10/10/2022 1414PM:    Details of the evaluation - subjective assessment (including medication profile, past medical, family and social history when applicable), examination, review of lab and test data, diagnostic impressions and medical decision making - performed by ARMINDA Figueroa CNP, were discussed with me on the date of service and I agree with clinical information herein unless otherwise noted. The patient has been evaluated by me personally earlier today. Pt reports no fevers, chills,n/v.     Exam: heart reg at rate of 60,lungs cta, abd pos bs soft nt, ext neg for le edema    I agree with the assessment and plan of ARMINDA Figueroa CNP    Diverticulosis with hx of diverticulitis with colovesical fistula  pyelonephritis   hyperglycemia  hyperlipidemia        Electronically signed by Mitch Ann D.O.   Hospitalist  [facility]ist [facility] at [facility]

## 2022-10-10 NOTE — BRIEF OP NOTE
Brief Postoperative Note      Patient: Dee Vaughan  YOB: 1945  MRN: [id number]    Date of Procedure: 10/10/2022    Pre-Op Diagnosis: Colovesicular fistula    Post-Op Diagnosis: Same, with chronic smoldering sigmoid diverticulitis       Procedure(s):  LAPAROSCOPIC ROBOTIC XI ASSISTED LEFT COLON RESECTION with primary end-to-end stapled anastomosis, splenic flexure takedown   CYSTOSCOPY EXTERNAL URETERAL STENT INSERTION    Surgeon(s):  MD Bautista Frausto MD    Assistant:  Resident: Jennifer Torre MD;  Tisha Greer DO    Anesthesia: General    Estimated Blood Loss (mL): less than 692     Complications: None    Specimens:   ID Type Source Tests Collected by Time Destination   A : sigmoid colon Tissue Tissue SURGICAL PATHOLOGY Man Be MD 10/10/2022 1746    B : DONUTS  Tissue Tissue SURGICAL PATHOLOGY Man Be MD 10/10/2022 1746        Implants:  * No implants in log *      Drains:   Urinary Catheter 10/10/22 2 Way (Active)       [REMOVED] Ureteral Drain/Stent 10/10/22 Right Ureter (Removed)       [REMOVED] Ureteral Drain/Stent 10/10/22 Left Ureter (Removed)       Findings: Extensive adhesions of the sigmoid colon to the anterior abdominal wall and left pelvic sidewall, bilateral ureters were protected during dissection with IC-Green fluoroscopy, robot assisted sigmoidectomy with end and descending colon to rectal stapled anastomosis using 29 EEA stapler, good donuts and leak test negative    2 8mm trocars at L hemiabdomen, 8mm vertical supraumbilical incision extended to minilaparotomy - closed with x2 0 looped PDS, and R hemiabdomen 12mm trocar closed with 0 vicryl UR6    Electronically signed by Lillie Gould MD on 10/10/2022 at 6:30 PM

## 2022-10-10 NOTE — OP NOTE
Operative Note      Patient: Rianna Lyman  YOB: 1945  MRN: [id number]    Date of Procedure: 10/10/2022    Pre-Op Diagnosis: Fistula of intestine, excluding rectum and anus [K63.2]    Post-Op Diagnosis: Same       Procedure: Cystoscopy, bilateral ureteral catheter insertion, instillation of ICG bilaterally    Surgeon(s):  MD Mele Roth MD    Assistant:   Resident: Frankie Cota MD    Anesthesia: General    Estimated Blood Loss (mL): Minimal    Complications: None    Specimens:   * No specimens in log *    Implants:  * No implants in log *      Drains: * No LDAs found *      INDICATIONS: Rianna Lyman is a 78 y.o. male who is to undergo abdominal surgery. Bilateral external ureteral catheters have been requested to be inserted. The patient was explained the risks, benefits, and alternatives of our procedure. They understood the risks, benefits, and alternatives and elected to proceed. PROCEDURE: The patient was brought to the operative room, placed in a supine position under general anesthesia. The patient was placed in the dorsal lithotomy position in stirrups with points of pressures being padded. SCDs were placed prior to the induction anesthesia. The patient was prepped and draped in a sterile fashion. A 21-Argentine cystoscope with a 30 degree lens was inserted it through the meatus in an atraumatic fashion. The urethra and bladder were inspected and were free of stricture, masses, or any other abnormalities. There was no evidence of tumors, or calculi. There was food debris floating within the bladder suggestive of a fistula. No obvious fistula was identified. The right and left ureteral orifices were normal in size, number and location. 5-Argentine open-ended catheters were placed in the right and left ureteral orifices in an atraumatic fashion. Each catheter was placed all the way into the kidney until resistance was met with no difficulty.   4 cc of ICG were injected into each catheter followed by a flush of saline. The bladder was left filled and the cystoscope was removed. A 16-Sami Lao catheter was inserted into the bladder. Each ureteral catheter was tunneled into the Lao catheter allowing for drainage directly into the Lao bag. The ureteral catheters were secured to the Lao with cloth tape. At the conclusion of the procedure, the ureteral catheters will be removed by the primary service.      Kody Gonzalez MD  10/10/2022  4:25 PM      Electronically signed by Kody Gonzalez MD on 10/10/2022 at 4:24 PM

## 2022-10-10 NOTE — PROGRESS NOTES
Wound / ostomy dept consulted for this Pt for pre-op stoma marking. The marks were placed in the R and L lower quadrants within the rectus abd muscle. He was assessed lying, sitting and standing. Care was taken to avoid creass and folds. All questions answered. Will follow post-op if he gets an ostomy.

## 2022-10-10 NOTE — PROGRESS NOTES
[address] [place] [address] [facility]  Progress Note    SUBJECTIVE:  Chief Complaint   Patient presents with    Abdominal Pain     Has fistula between bowl and bladder    Fever     102.4 for ems      Patient is tolerating medications. No reported adverse drug reactions. No nausea, vomiting, diarrhea. In bed, waiting for OR today     Review of systems:  As stated above in the chief complaint, otherwise negative. Medications:  Scheduled Meds:   metroNIDAZOLE  500 mg IntraVENous Q8H    sertraline  50 mg Oral Daily    atorvastatin  10 mg Oral Daily    polyethylene glycol  17 g Oral Once    cefTRIAXone (ROCEPHIN) IV  2,000 mg IntraVENous Q24H    sodium chloride flush  5-40 mL IntraVENous 2 times per day    enoxaparin  40 mg SubCUTAneous Daily     Continuous Infusions:   lactated ringers 125 mL/hr at 10/10/22 0426    sodium chloride       PRN Meds:sodium chloride, morphine, sodium chloride flush, sodium chloride, ondansetron **OR** ondansetron, polyethylene glycol, acetaminophen **OR** acetaminophen    OBJECTIVE:  /60   Pulse 60   Temp 98.6 °F (37 °C) (Oral)   Resp 16   Ht 5' 4\" (1.626 m)   Wt 156 lb (70.8 kg)   SpO2 97%   BMI 26.78 kg/m²   Temp  Av.1 °F (36.7 °C)  Min: 97.5 °F (36.4 °C)  Max: 98.6 °F (37 °C)  Constitutional: The patient is awake, alert, and oriented. In bed. Skin: Warm and dry. No rashes were noted. HEENT: Round and reactive pupils. Moist mucous membranes. No ulcerations or thrush. Neck: Supple to movements. Chest: No use of accessory muscles to breathe. Symmetrical expansion. No wheezing, crackles or rhonchi. Cardiovascular: S1 and S2 are rhythmic and regular. No murmurs appreciated. Abdomen: Positive bowel sounds to auscultation. Minimal LLQ tendernes to palpation. No masses felt. Extremities: No edema.   Lines: peripheral    Laboratory and Tests Review:  Lab Results   Component Value Date    WBC 6.7 10/10/2022    WBC 7.1 10/06/2022    WBC 7.5 10/05/2022    HGB 13.3 10/10/2022    HCT 40.4 10/10/2022    MCV 98.5 10/10/2022     10/10/2022     Lab Results   Component Value Date    NEUTROABS 4.37 10/10/2022    NEUTROABS 7.42 (H) 10/04/2022    NEUTROABS 8.65 (H) 10/03/2022     No results found for: CRPHS  Lab Results   Component Value Date    ALT 15 10/03/2022    AST 16 10/03/2022    ALKPHOS 58 10/03/2022    BILITOT 0.8 10/03/2022     Lab Results   Component Value Date/Time     10/10/2022 03:28 AM    K 4.1 10/10/2022 03:28 AM    K 4.0 10/04/2022 09:15 AM     10/10/2022 03:28 AM    CO2 28 10/10/2022 03:28 AM    BUN 12 10/10/2022 03:28 AM    CREATININE 0.9 10/10/2022 03:28 AM    CREATININE 0.8 10/06/2022 05:02 AM    CREATININE 0.9 10/05/2022 04:28 AM    GFRAA >60 10/10/2022 03:28 AM    LABGLOM >60 10/10/2022 03:28 AM    GLUCOSE 95 10/10/2022 03:28 AM    PROT 6.6 10/03/2022 04:20 AM    LABALBU 3.7 10/03/2022 04:20 AM    CALCIUM 9.3 10/10/2022 03:28 AM    BILITOT 0.8 10/03/2022 04:20 AM    ALKPHOS 58 10/03/2022 04:20 AM    AST 16 10/03/2022 04:20 AM    ALT 15 10/03/2022 04:20 AM     Lab Results   Component Value Date    CRP <0.1 04/23/2018     Lab Results   Component Value Date    SEDRATE 4 09/17/2020    SEDRATE 24 (H) 04/23/2018     Radiology:  Reviewed     Microbiology:   Blood cultures: negative so far  Urine culture: >100K E.coli (pansensitive)     ASSESSMENT:  Left sided pyelonephritis from colovesical fistula: cx showed E.coli  Hx of prior diverticulitis:   Multiple antibiotic allergies: he had rash to penicillin, bactrim and cipro    PLAN:  Continue Ceftriaxone 2g IV daily & IV Flagyl  Surgery following: planning for L hemicolectomy, possible open possible ostomy today  Monitor labs   We will follow with you     ARMINDA Griffin - CNP  10:36 AM  10/10/2022     Pt seen and examined. Above discussed agree with advanced practice nurse. Labs, cultures, and radiographs reviewed. Face to Face encounter occurred. Changes made as necessary. Jaylin Chavarria MD

## 2022-10-11 LAB
ANION GAP SERPL CALCULATED.3IONS-SCNC: 9 MMOL/L (ref 7–16)
BUN BLDV-MCNC: 16 MG/DL (ref 6–23)
CALCIUM SERPL-MCNC: 8.5 MG/DL (ref 8.6–10.2)
CHLORIDE BLD-SCNC: 102 MMOL/L (ref 98–107)
CO2: 23 MMOL/L (ref 22–29)
CREAT SERPL-MCNC: 0.9 MG/DL (ref 0.7–1.2)
GFR AFRICAN AMERICAN: >60
GFR NON-AFRICAN AMERICAN: >60 ML/MIN/1.73
GLUCOSE BLD-MCNC: 190 MG/DL (ref 74–99)
HCT VFR BLD CALC: 38.5 % (ref 37–54)
HEMOGLOBIN: 12.7 G/DL (ref 12.5–16.5)
MAGNESIUM: 2 MG/DL (ref 1.6–2.6)
MCH RBC QN AUTO: 32.4 PG (ref 26–35)
MCHC RBC AUTO-ENTMCNC: 33 % (ref 32–34.5)
MCV RBC AUTO: 98.2 FL (ref 80–99.9)
MRSA CULTURE ONLY: NORMAL
PDW BLD-RTO: 12.1 FL (ref 11.5–15)
PHOSPHORUS: 3.1 MG/DL (ref 2.5–4.5)
PLATELET # BLD: 297 E9/L (ref 130–450)
PMV BLD AUTO: 9.4 FL (ref 7–12)
POTASSIUM SERPL-SCNC: 4.4 MMOL/L (ref 3.5–5)
RBC # BLD: 3.92 E12/L (ref 3.8–5.8)
SODIUM BLD-SCNC: 134 MMOL/L (ref 132–146)
WBC # BLD: 8.7 E9/L (ref 4.5–11.5)

## 2022-10-11 PROCEDURE — 6360000002 HC RX W HCPCS: Performed by: STUDENT IN AN ORGANIZED HEALTH CARE EDUCATION/TRAINING PROGRAM

## 2022-10-11 PROCEDURE — 83735 ASSAY OF MAGNESIUM: CPT

## 2022-10-11 PROCEDURE — 6370000000 HC RX 637 (ALT 250 FOR IP): Performed by: STUDENT IN AN ORGANIZED HEALTH CARE EDUCATION/TRAINING PROGRAM

## 2022-10-11 PROCEDURE — 36415 COLL VENOUS BLD VENIPUNCTURE: CPT

## 2022-10-11 PROCEDURE — 1200000000 HC SEMI PRIVATE

## 2022-10-11 PROCEDURE — 85027 COMPLETE CBC AUTOMATED: CPT

## 2022-10-11 PROCEDURE — 80048 BASIC METABOLIC PNL TOTAL CA: CPT

## 2022-10-11 PROCEDURE — 84100 ASSAY OF PHOSPHORUS: CPT

## 2022-10-11 PROCEDURE — 2500000003 HC RX 250 WO HCPCS: Performed by: STUDENT IN AN ORGANIZED HEALTH CARE EDUCATION/TRAINING PROGRAM

## 2022-10-11 PROCEDURE — 2580000003 HC RX 258: Performed by: STUDENT IN AN ORGANIZED HEALTH CARE EDUCATION/TRAINING PROGRAM

## 2022-10-11 PROCEDURE — 99232 SBSQ HOSP IP/OBS MODERATE 35: CPT | Performed by: INTERNAL MEDICINE

## 2022-10-11 PROCEDURE — 6370000000 HC RX 637 (ALT 250 FOR IP): Performed by: REGISTERED NURSE

## 2022-10-11 RX ORDER — METRONIDAZOLE 500 MG/1
500 TABLET ORAL EVERY 8 HOURS SCHEDULED
Status: DISCONTINUED | OUTPATIENT
Start: 2022-10-11 | End: 2022-10-13 | Stop reason: HOSPADM

## 2022-10-11 RX ADMIN — METRONIDAZOLE 500 MG: 500 INJECTION, SOLUTION INTRAVENOUS at 03:05

## 2022-10-11 RX ADMIN — ENOXAPARIN SODIUM 40 MG: 100 INJECTION SUBCUTANEOUS at 09:56

## 2022-10-11 RX ADMIN — ATORVASTATIN CALCIUM 10 MG: 10 TABLET, FILM COATED ORAL at 21:10

## 2022-10-11 RX ADMIN — ACETAMINOPHEN 1000 MG: 500 TABLET ORAL at 07:42

## 2022-10-11 RX ADMIN — METRONIDAZOLE 500 MG: 500 TABLET ORAL at 21:10

## 2022-10-11 RX ADMIN — OXYCODONE HYDROCHLORIDE 5 MG: 5 TABLET ORAL at 03:10

## 2022-10-11 RX ADMIN — METRONIDAZOLE 500 MG: 500 TABLET ORAL at 13:38

## 2022-10-11 RX ADMIN — ACETAMINOPHEN 1000 MG: 500 TABLET ORAL at 13:38

## 2022-10-11 RX ADMIN — SERTRALINE HYDROCHLORIDE 50 MG: 50 TABLET ORAL at 09:56

## 2022-10-11 RX ADMIN — WATER 2000 MG: 1 INJECTION INTRAMUSCULAR; INTRAVENOUS; SUBCUTANEOUS at 09:56

## 2022-10-11 ASSESSMENT — PAIN SCALES - GENERAL
PAINLEVEL_OUTOF10: 2
PAINLEVEL_OUTOF10: 3
PAINLEVEL_OUTOF10: 0
PAINLEVEL_OUTOF10: 4

## 2022-10-11 ASSESSMENT — PAIN DESCRIPTION - DESCRIPTORS: DESCRIPTORS: ACHING;DULL;SORE

## 2022-10-11 ASSESSMENT — PAIN DESCRIPTION - LOCATION
LOCATION: ABDOMEN
LOCATION: ABDOMEN

## 2022-10-11 ASSESSMENT — PAIN - FUNCTIONAL ASSESSMENT
PAIN_FUNCTIONAL_ASSESSMENT: ACTIVITIES ARE NOT PREVENTED
PAIN_FUNCTIONAL_ASSESSMENT: ACTIVITIES ARE NOT PREVENTED

## 2022-10-11 ASSESSMENT — PAIN DESCRIPTION - ORIENTATION
ORIENTATION: MID
ORIENTATION: MID;INNER;LOWER

## 2022-10-11 ASSESSMENT — PAIN DESCRIPTION - FREQUENCY: FREQUENCY: CONTINUOUS

## 2022-10-11 ASSESSMENT — PAIN DESCRIPTION - ONSET: ONSET: ON-GOING

## 2022-10-11 ASSESSMENT — PAIN DESCRIPTION - PAIN TYPE: TYPE: ACUTE PAIN;SURGICAL PAIN

## 2022-10-11 NOTE — ANESTHESIA POSTPROCEDURE EVALUATION
Department of Anesthesiology  Postprocedure Note    Patient: Conchita Vazquez  MRN: [id number]  YOB: 1945  Date of evaluation: 10/10/2022      Procedure Summary     Date: 10/10/22 Room / Location: [facility] 09 / 106 [facility]    Anesthesia Start: 9692 Anesthesia Stop: 1835    Procedures:       LAPAROSCOPIC ROBOTIC XI ASSISTED LEFT COLON RESECTION POSSIBLE OPEN (Left: Abdomen)      CYSTOSCOPY EXTERNAL URETERAL STENT INSERTION (Bilateral: Urethra) Diagnosis:       Fistula of intestine, excluding rectum and anus      (Fistula of intestine, excluding rectum and anus [K63.2])    Surgeons: Ruth Naranjo MD Responsible Provider: Gali Iraheta MD    Anesthesia Type: General ASA Status: 4          Anesthesia Type: General    Larry Phase I: Larry Score: 8    Larry Phase II:        Anesthesia Post Evaluation    Patient location during evaluation: PACU  Patient participation: complete - patient participated  Level of consciousness: awake and alert  Airway patency: patent  Nausea & Vomiting: no nausea and no vomiting  Complications: no  Cardiovascular status: hemodynamically stable  Respiratory status: acceptable  Hydration status: euvolemic  Multimodal analgesia pain management approach

## 2022-10-11 NOTE — PROGRESS NOTES
GENERAL SURGERY  DAILY PROGRESS NOTE  10/11/2022  Chief Complaint   Patient presents with    Abdominal Pain     Has fistula between bowl and bladder    Fever     102.4 for ems        Subjective:  Patient is post op day 1 from laparoscopic robotic assisted left colon resection with primary anastomosis. Patient pain well controlled, denies n/v overnight. No bowel function to now. Tolerating diet post op. Urine clear.      Objective:  BP (!) 148/70   Pulse 78   Temp 98.7 °F (37.1 °C) (Oral)   Resp 16   Ht 5' 4\" (1.626 m)   Wt 156 lb (70.8 kg)   SpO2 95%   BMI 26.78 kg/m²     In: 1775 [P.O.:25; I.V.:1750]  Out: 360 [Urine:310]    General appearance: NAD, appears stated age  Lungs: Equal chest rise bilaterally, no retractions, no audible wheezing  Heart: warm throughout  Abdomen: soft, appropriately tender, ND, incisions CDIT  Skin: no obvious rashes or lesions appreciated    CBC x3  Recent Labs     10/10/22  0328 10/11/22  0355   WBC 6.7 8.7   RBC 4.10 3.92   HGB 13.3 12.7   HCT 40.4 38.5   MCV 98.5 98.2   MCH 32.4 32.4   MCHC 32.9 33.0   RDW 12.3 12.1    297   MPV 9.2 9.4         BMP x3  Recent Labs     10/10/22  0328 10/11/22  0400    134   K 4.1 4.4    102   CO2 28 23   BUN 12 16   CREATININE 0.9 0.9   GLUCOSE 95 190*   CALCIUM 9.3 8.5*           Assessment/Plan:  78 y.o. male with pyelonephritis on IV abx with known colovesicular fistula in the setting of multiple prior episodes of diverticulitis POD #1 laparascopic robotic assisted left colon resection with primary anastomosis    - or for lap robo assisted L hemicolectomy, poss open poss ostomy today  - Continue IVF   - Continue Clear liquid diet   - Continue to monitor abdominal exam  - ID recommendations on Rocephin flagyl currently   - Ambulate   - Pain and nausea control as needed  - DO NOT remove kowalski catheter    Shane Acharya DO  General Surgery Resident, PGY-1    Electronically signed on 10/11/2022 at 5:10 AM

## 2022-10-11 NOTE — CARE COORDINATION
Updated plan of care. POD#1 colon resection/lysis of adhesions and cystoscopy with stent insertion. Pt ambulating the halls. No need for home care-cancelled [place]. Plan remains home with no needs.  Will continue to follow-[place]

## 2022-10-11 NOTE — PROGRESS NOTES
[address] [place] [address] [facility]  Progress Note    SUBJECTIVE:  Chief Complaint   Patient presents with    Abdominal Pain     Has fistula between bowl and bladder    Fever     102.4 for ems      Patient is tolerating medications. No nausea, vomiting, diarrhea. Sitting up in the chair. Feeling well  Tmax 100.4 last evening   Ate breakfast with  no issues  Has been up ambulating in the halls       Review of systems:  As stated above in the chief complaint, otherwise negative. Medications:  Scheduled Meds:   acetaminophen  1,000 mg Oral 3 times per day    metroNIDAZOLE  500 mg IntraVENous Q8H    sertraline  50 mg Oral Daily    atorvastatin  10 mg Oral Daily    cefTRIAXone (ROCEPHIN) IV  2,000 mg IntraVENous Q24H    sodium chloride flush  5-40 mL IntraVENous 2 times per day    enoxaparin  40 mg SubCUTAneous Daily     Continuous Infusions:   lactated ringers 125 mL/hr at 10/10/22 0426    sodium chloride       PRN Meds:oxyCODONE **OR** oxyCODONE, HYDROmorphone, sodium chloride, sodium chloride flush, sodium chloride, ondansetron **OR** ondansetron    OBJECTIVE:  /71   Pulse 64   Temp 98.7 °F (37.1 °C) (Oral)   Resp 16   Ht 5' 4\" (1.626 m)   Wt 156 lb (70.8 kg)   SpO2 93%   BMI 26.78 kg/m²   Temp  Av.3 °F (36.8 °C)  Min: 96.8 °F (36 °C)  Max: 100.4 °F (38 °C)  Constitutional: The patient is awake, alert, and oriented. Sitting up in the chair, wife present   Skin: Warm and dry. No rashes were noted. HEENT: Round and reactive pupils. Moist mucous membranes. No ulcerations or thrush. Neck: Supple to movements. Chest: No respiratory distress Symmetrical expansion. Lung sounds clear. Cardiovascular: S1 and S2 are rhythmic and regular. No murmurs appreciated. Abdomen: Positive bowel sounds to auscultation. Tenderness post op, incisions clean/dry  Extremities: No edema.   Lines: peripheral  Lao catheter     Laboratory and Tests Review:  Lab Results   Component Value Date WBC 8.7 10/11/2022    WBC 6.7 10/10/2022    WBC 7.1 10/06/2022    HGB 12.7 10/11/2022    HCT 38.5 10/11/2022    MCV 98.2 10/11/2022     10/11/2022     Lab Results   Component Value Date    NEUTROABS 4.37 10/10/2022    NEUTROABS 7.42 (H) 10/04/2022    NEUTROABS 8.65 (H) 10/03/2022     No results found for: CRPHS  Lab Results   Component Value Date    ALT 15 10/03/2022    AST 16 10/03/2022    ALKPHOS 58 10/03/2022    BILITOT 0.8 10/03/2022     Lab Results   Component Value Date/Time     10/11/2022 04:00 AM    K 4.4 10/11/2022 04:00 AM    K 4.0 10/04/2022 09:15 AM     10/11/2022 04:00 AM    CO2 23 10/11/2022 04:00 AM    BUN 16 10/11/2022 04:00 AM    CREATININE 0.9 10/11/2022 04:00 AM    CREATININE 0.9 10/10/2022 03:28 AM    CREATININE 0.8 10/06/2022 05:02 AM    GFRAA >60 10/11/2022 04:00 AM    LABGLOM >60 10/11/2022 04:00 AM    GLUCOSE 190 10/11/2022 04:00 AM    PROT 6.6 10/03/2022 04:20 AM    LABALBU 3.7 10/03/2022 04:20 AM    CALCIUM 8.5 10/11/2022 04:00 AM    BILITOT 0.8 10/03/2022 04:20 AM    ALKPHOS 58 10/03/2022 04:20 AM    AST 16 10/03/2022 04:20 AM    ALT 15 10/03/2022 04:20 AM     Lab Results   Component Value Date    CRP <0.1 04/23/2018     Lab Results   Component Value Date    SEDRATE 4 09/17/2020    SEDRATE 24 (H) 04/23/2018     Radiology:  Reviewed     Microbiology:   Blood cultures: negative so far  Urine culture: >100K E.coli (pansensitive)     ASSESSMENT:  Left sided pyelonephritis from colovesical fistula: cx showed E.coli  Hx of prior diverticulitis:   Multiple antibiotic allergies: he had rash to penicillin, bactrim and cipro  Status post Laparoscopic robotic assisted left colon resection with primary anastomosis 10/10/2022     PLAN:  Continue Ceftriaxone 2g IV daily - day 9  Change Flagyl to PO - on diet, tolerating orals   Surgery following: post op day 1   Labs reviewed   Will discuss final discharge plans with Dr. Jovanna Naqvi   We will follow with you     ARMINDA Thurston - CNP  9:52 AM  10/11/2022       Pt seen and examined. Above discussed agree with advanced practice nurse. Labs, cultures, and radiographs reviewed. Face to Face encounter occurred. Changes made as necessary. Reviewed op notes.  Will keep him on IV ceftriaxone until discharge and switch to PO upon discharge, planning around 2 more weeks of antibiotics post op,     Mendoza Miguel MD

## 2022-10-11 NOTE — PROGRESS NOTES
[facility] [place] Hospitalist   Progress Note    Admitting Date and Time: 10/2/2022  4:51 PM  Admit Dx: Diverticulitis [K57.92]  Diverticulitis of colon [K57.32]  Acute cystitis without hematuria [N30.00]    Subjective:    Patient was admitted with Diverticulitis [K57.92]  Diverticulitis of colon [K57.32]  Acute cystitis without hematuria [N30.00].  Patient denies fever, chills, cp, sob, n/v.     metroNIDAZOLE  500 mg Oral 3 times per day    acetaminophen  1,000 mg Oral 3 times per day    sertraline  50 mg Oral Daily    atorvastatin  10 mg Oral Daily    cefTRIAXone (ROCEPHIN) IV  2,000 mg IntraVENous Q24H    sodium chloride flush  5-40 mL IntraVENous 2 times per day    enoxaparin  40 mg SubCUTAneous Daily     oxyCODONE, 5 mg, Q4H PRN   Or  oxyCODONE, 10 mg, Q4H PRN  HYDROmorphone, 0.25 mg, Q4H PRN  sodium chloride, 1 spray, Q4H PRN  sodium chloride flush, 5-40 mL, PRN  sodium chloride, , PRN  ondansetron, 4 mg, Q8H PRN   Or  ondansetron, 4 mg, Q6H PRN         Objective:    BP (!) 119/58   Pulse 83   Temp 97.8 °F (36.6 °C) (Oral)   Resp 16   Ht 5' 4\" (1.626 m)   Wt 156 lb (70.8 kg)   SpO2 96%   BMI 26.78 kg/m²   Skin: warm and dry, no rash or erythema  Pulmonary/Chest: clear to auscultation bilaterally- no wheezes, rales or rhonchi, normal air movement, no respiratory distress  Cardiovascular: rhythm reg at rate of 84  Abdomen: soft, non-tender, non-distended, normal bowel sounds, no masses or organomegaly  Extremities: no cyanosis, no clubbing, and no edema      Recent Labs     10/10/22  0328 10/11/22  0400    134   K 4.1 4.4    102   CO2 28 23   BUN 12 16   CREATININE 0.9 0.9   GLUCOSE 95 190*   CALCIUM 9.3 8.5*       Recent Labs     10/10/22  0328 10/11/22  0355   WBC 6.7 8.7   RBC 4.10 3.92   HGB 13.3 12.7   HCT 40.4 38.5   MCV 98.5 98.2   MCH 32.4 32.4   MCHC 32.9 33.0   RDW 12.3 12.1    297   MPV 9.2 9.4       CBC:   Lab Results   Component Value Date/Time    WBC 8.7 10/11/2022 03:55 AM    RBC 3.92 10/11/2022 03:55 AM    HGB 12.7 10/11/2022 03:55 AM    HCT 38.5 10/11/2022 03:55 AM    MCV 98.2 10/11/2022 03:55 AM    MCH 32.4 10/11/2022 03:55 AM    MCHC 33.0 10/11/2022 03:55 AM    RDW 12.1 10/11/2022 03:55 AM     10/11/2022 03:55 AM    MPV 9.4 10/11/2022 03:55 AM     BMP:    Lab Results   Component Value Date/Time     10/11/2022 04:00 AM    K 4.4 10/11/2022 04:00 AM    K 4.0 10/04/2022 09:15 AM     10/11/2022 04:00 AM    CO2 23 10/11/2022 04:00 AM    BUN 16 10/11/2022 04:00 AM    LABALBU 3.7 10/03/2022 04:20 AM    CREATININE 0.9 10/11/2022 04:00 AM    CALCIUM 8.5 10/11/2022 04:00 AM    GFRAA >60 10/11/2022 04:00 AM    LABGLOM >60 10/11/2022 04:00 AM    GLUCOSE 190 10/11/2022 04:00 AM     Magnesium:    Lab Results   Component Value Date/Time    MG 2.0 10/11/2022 04:00 AM     Phosphorus:    Lab Results   Component Value Date/Time    PHOS 3.1 10/11/2022 04:00 AM        Radiology:   CT ABDOMEN PELVIS W IV CONTRAST Additional Contrast? None   Final Result   Diverticulosis of colon with focal thickening of the sigmoid colon which may   be due to uncomplicated diverticulitis. Colonoscopy may be considered when   feasible to exclude underlying malignancy. Colovesical fistula with significantly thickened and inflamed urinary bladder   concerning for cystitis with possibly  extending inflammation  to involve the   left ureter extending to left renal pelvis resulting in ureteritis and   possibly pyelonephritis. Please correlate with urinalysis. XR CHEST PORTABLE   Final Result   No acute process. Assessment:    Principal Problem:    Diverticulitis  Active Problems:    Acute cystitis without hematuria    Pyelonephritis    Colovesical fistula    Hyperglycemia  Resolved Problems:    * No resolved hospital problems. *      Plan:  Diverticulosis with hx of diverticulitis with colovesical fistula s/p surgery. Monitor with kowalski.  Urology and gen surg following. Encourage activity.  Encouraged and discussed importance of IS  pyelonephritis transition to po at discretion of ID  Hyperglycemia monitor  Hyperlipidemia continue med        Electronically signed by Elvira Monsalve DO on 10/11/2022 at 3:02 PM

## 2022-10-12 PROBLEM — E83.39 HYPOPHOSPHATEMIA: Status: ACTIVE | Noted: 2022-10-12

## 2022-10-12 LAB
ANION GAP SERPL CALCULATED.3IONS-SCNC: 5 MMOL/L (ref 7–16)
BUN BLDV-MCNC: 14 MG/DL (ref 6–23)
CALCIUM SERPL-MCNC: 8.7 MG/DL (ref 8.6–10.2)
CHLORIDE BLD-SCNC: 108 MMOL/L (ref 98–107)
CO2: 27 MMOL/L (ref 22–29)
CREAT SERPL-MCNC: 0.9 MG/DL (ref 0.7–1.2)
GFR AFRICAN AMERICAN: >60
GFR NON-AFRICAN AMERICAN: >60 ML/MIN/1.73
GLUCOSE BLD-MCNC: 106 MG/DL (ref 74–99)
HCT VFR BLD CALC: 35.3 % (ref 37–54)
HEMOGLOBIN: 11.7 G/DL (ref 12.5–16.5)
MAGNESIUM: 2.1 MG/DL (ref 1.6–2.6)
MCH RBC QN AUTO: 33 PG (ref 26–35)
MCHC RBC AUTO-ENTMCNC: 33.1 % (ref 32–34.5)
MCV RBC AUTO: 99.4 FL (ref 80–99.9)
PDW BLD-RTO: 12.8 FL (ref 11.5–15)
PHOSPHORUS: 1.5 MG/DL (ref 2.5–4.5)
PLATELET # BLD: 304 E9/L (ref 130–450)
PMV BLD AUTO: 9.4 FL (ref 7–12)
POTASSIUM SERPL-SCNC: 4.4 MMOL/L (ref 3.5–5)
RBC # BLD: 3.55 E12/L (ref 3.8–5.8)
SODIUM BLD-SCNC: 140 MMOL/L (ref 132–146)
WBC # BLD: 9.9 E9/L (ref 4.5–11.5)

## 2022-10-12 PROCEDURE — 6370000000 HC RX 637 (ALT 250 FOR IP): Performed by: STUDENT IN AN ORGANIZED HEALTH CARE EDUCATION/TRAINING PROGRAM

## 2022-10-12 PROCEDURE — 6370000000 HC RX 637 (ALT 250 FOR IP): Performed by: REGISTERED NURSE

## 2022-10-12 PROCEDURE — 80048 BASIC METABOLIC PNL TOTAL CA: CPT

## 2022-10-12 PROCEDURE — 6370000000 HC RX 637 (ALT 250 FOR IP)

## 2022-10-12 PROCEDURE — 6360000002 HC RX W HCPCS: Performed by: STUDENT IN AN ORGANIZED HEALTH CARE EDUCATION/TRAINING PROGRAM

## 2022-10-12 PROCEDURE — 99232 SBSQ HOSP IP/OBS MODERATE 35: CPT | Performed by: NURSE PRACTITIONER

## 2022-10-12 PROCEDURE — 36415 COLL VENOUS BLD VENIPUNCTURE: CPT

## 2022-10-12 PROCEDURE — 99232 SBSQ HOSP IP/OBS MODERATE 35: CPT | Performed by: INTERNAL MEDICINE

## 2022-10-12 PROCEDURE — 2580000003 HC RX 258: Performed by: STUDENT IN AN ORGANIZED HEALTH CARE EDUCATION/TRAINING PROGRAM

## 2022-10-12 PROCEDURE — 84100 ASSAY OF PHOSPHORUS: CPT

## 2022-10-12 PROCEDURE — 83735 ASSAY OF MAGNESIUM: CPT

## 2022-10-12 PROCEDURE — 85027 COMPLETE CBC AUTOMATED: CPT

## 2022-10-12 PROCEDURE — 2580000003 HC RX 258: Performed by: INTERNAL MEDICINE

## 2022-10-12 PROCEDURE — 1200000000 HC SEMI PRIVATE

## 2022-10-12 PROCEDURE — 2500000003 HC RX 250 WO HCPCS: Performed by: INTERNAL MEDICINE

## 2022-10-12 RX ORDER — POLYETHYLENE GLYCOL 3350 17 G/17G
17 POWDER, FOR SOLUTION ORAL DAILY
Status: DISCONTINUED | OUTPATIENT
Start: 2022-10-12 | End: 2022-10-13 | Stop reason: HOSPADM

## 2022-10-12 RX ADMIN — SODIUM PHOSPHATE, MONOBASIC, MONOHYDRATE 10 MMOL: 276; 142 INJECTION, SOLUTION INTRAVENOUS at 18:35

## 2022-10-12 RX ADMIN — ACETAMINOPHEN 1000 MG: 500 TABLET ORAL at 21:12

## 2022-10-12 RX ADMIN — POLYETHYLENE GLYCOL 3350 17 G: 17 POWDER, FOR SOLUTION ORAL at 08:35

## 2022-10-12 RX ADMIN — ENOXAPARIN SODIUM 40 MG: 100 INJECTION SUBCUTANEOUS at 08:35

## 2022-10-12 RX ADMIN — WATER 2000 MG: 1 INJECTION INTRAMUSCULAR; INTRAVENOUS; SUBCUTANEOUS at 08:44

## 2022-10-12 RX ADMIN — METRONIDAZOLE 500 MG: 500 TABLET ORAL at 07:02

## 2022-10-12 RX ADMIN — ACETAMINOPHEN 1000 MG: 500 TABLET ORAL at 14:24

## 2022-10-12 RX ADMIN — ACETAMINOPHEN 1000 MG: 500 TABLET ORAL at 07:02

## 2022-10-12 RX ADMIN — SERTRALINE HYDROCHLORIDE 50 MG: 50 TABLET ORAL at 08:35

## 2022-10-12 RX ADMIN — ATORVASTATIN CALCIUM 10 MG: 10 TABLET, FILM COATED ORAL at 21:12

## 2022-10-12 RX ADMIN — METRONIDAZOLE 500 MG: 500 TABLET ORAL at 21:12

## 2022-10-12 RX ADMIN — ACETAMINOPHEN 1000 MG: 500 TABLET ORAL at 01:08

## 2022-10-12 RX ADMIN — METRONIDAZOLE 500 MG: 500 TABLET ORAL at 14:24

## 2022-10-12 RX ADMIN — SODIUM CHLORIDE, PRESERVATIVE FREE 10 ML: 5 INJECTION INTRAVENOUS at 08:35

## 2022-10-12 ASSESSMENT — PAIN DESCRIPTION - PAIN TYPE: TYPE: ACUTE PAIN;SURGICAL PAIN

## 2022-10-12 ASSESSMENT — PAIN DESCRIPTION - DESCRIPTORS
DESCRIPTORS: ACHING;SORE
DESCRIPTORS: ACHING;SORE

## 2022-10-12 ASSESSMENT — PAIN DESCRIPTION - LOCATION
LOCATION: ABDOMEN

## 2022-10-12 ASSESSMENT — PAIN DESCRIPTION - ORIENTATION
ORIENTATION: MID;LEFT;RIGHT;LOWER
ORIENTATION: MID;RIGHT;LEFT

## 2022-10-12 ASSESSMENT — PAIN DESCRIPTION - ONSET: ONSET: ON-GOING

## 2022-10-12 ASSESSMENT — PAIN DESCRIPTION - FREQUENCY: FREQUENCY: INTERMITTENT

## 2022-10-12 ASSESSMENT — PAIN SCALES - GENERAL
PAINLEVEL_OUTOF10: 8
PAINLEVEL_OUTOF10: 4
PAINLEVEL_OUTOF10: 8
PAINLEVEL_OUTOF10: 4

## 2022-10-12 ASSESSMENT — PAIN - FUNCTIONAL ASSESSMENT: PAIN_FUNCTIONAL_ASSESSMENT: ACTIVITIES ARE NOT PREVENTED

## 2022-10-12 NOTE — CARE COORDINATION
Updated plan of care. POD#2. Lao cath, continue iv antibiotics until discharge than switch to PO.  Plan remains home with no needs-o

## 2022-10-12 NOTE — PROGRESS NOTES
P Quality Flow/Interdisciplinary Rounds Progress Note        Quality Flow Rounds held on October 12, 2022    Disciplines Attending:  Bedside Nurse, , , and Nursing Unit Leadership    Darin Sebastian was admitted on 10/2/2022  4:51 PM    Anticipated Discharge Date:       Disposition:    Dani Score:  Dani Scale Score: 23    Readmission Risk              Risk of Unplanned Readmission:  11           Discussed patient goal for the day, patient clinical progression, and barriers to discharge.   The following Goal(s) of the Day/Commitment(s) have been identified:   Discharge [address] [place]  October 12, 2022

## 2022-10-12 NOTE — PROGRESS NOTES
GENERAL SURGERY  DAILY PROGRESS NOTE  10/12/2022  Chief Complaint   Patient presents with    Abdominal Pain     Has fistula between bowl and bladder    Fever     102.4 for ems        Subjective:  No acute events overnight. Feeling better today, had small bm overnight and flatus. Tolerating diet without issue     AFHS, AM labs pending.      Objective:  BP (!) 164/78   Pulse 96   Temp 98.4 °F (36.9 °C) (Oral)   Resp 18   Ht 5' 4\" (1.626 m)   Wt 156 lb (70.8 kg)   SpO2 94%   BMI 26.78 kg/m²     In: 175 [P.O.:25; I.V.:150]  Out: 1050 [Urine:1050]    General appearance: NAD, appears stated age  Lungs: Equal chest rise bilaterally, no retractions, no audible wheezing  Heart: warm throughout  Abdomen: soft, appropriately tender, ND, incisions clean/dry/intact  Skin: no obvious rashes or lesions appreciated    CBC x3  Recent Labs     10/10/22  0328 10/11/22  0355   WBC 6.7 8.7   RBC 4.10 3.92   HGB 13.3 12.7   HCT 40.4 38.5   MCV 98.5 98.2   MCH 32.4 32.4   MCHC 32.9 33.0   RDW 12.3 12.1    297   MPV 9.2 9.4         BMP x3  Recent Labs     10/10/22  0328 10/11/22  0400    134   K 4.1 4.4    102   CO2 28 23   BUN 12 16   CREATININE 0.9 0.9   GLUCOSE 95 190*   CALCIUM 9.3 8.5*           Assessment/Plan:  78 y.o. male with pyelonephritis on IV abx with known colovesicular fistula in the setting of multiple prior episodes of diverticulitis POD #1 laparascopic robotic assisted left colon resection with primary anastomosis    - or for lap robo assisted L hemicolectomy, poss open poss ostomy today  - Continue IVF   - Continue Clear liquid diet   - Continue to monitor abdominal exam  - ID recommendations on Rocephin flagyl currently, awaiting recs for dc  - Ambulate   - Pain and nausea control as needed  - DO NOT remove kowalski catheter, plan for fluoroscopic cystography 10/13  - bowel reg  - appreciate urology recs for catheter     Clyde Castleman, DO  General Surgery Resident, PGY-1    Electronically signed on 10/12/2022 at 5:20 AM

## 2022-10-12 NOTE — PROGRESS NOTES
[facility] Progress Note    Admitting Date and Time: 10/2/2022  4:51 PM  Admit Dx: Diverticulitis [K57.92]  Diverticulitis of colon [K57.32]  Acute cystitis without hematuria [N30.00]    Subjective:  Patient is being followed for Diverticulitis [K57.92]  Diverticulitis of colon [K57.32]  Acute cystitis without hematuria [N30.00]     Patient seen sitting up in bed he is alert and in no apparent distress. He is denying abdominal pain abdomen is flat, and non-tender. Incision are intact with surgical glue. States he is eating without difficulty-denies nausea or vomiting. He states he is passing flatus but has not had a BM. No acute issues at this time. ROS: denies fever, chills, cp, sob, n/v, HA unless stated above.       polyethylene glycol  17 g Oral Daily    metroNIDAZOLE  500 mg Oral 3 times per day    acetaminophen  1,000 mg Oral 3 times per day    sertraline  50 mg Oral Daily    atorvastatin  10 mg Oral Daily    cefTRIAXone (ROCEPHIN) IV  2,000 mg IntraVENous Q24H    sodium chloride flush  5-40 mL IntraVENous 2 times per day    enoxaparin  40 mg SubCUTAneous Daily     oxyCODONE, 5 mg, Q4H PRN   Or  oxyCODONE, 10 mg, Q4H PRN  HYDROmorphone, 0.25 mg, Q4H PRN  sodium chloride, 1 spray, Q4H PRN  sodium chloride flush, 5-40 mL, PRN  sodium chloride, , PRN  ondansetron, 4 mg, Q8H PRN   Or  ondansetron, 4 mg, Q6H PRN       Objective:    BP (!) 182/76   Pulse 82   Temp 98.4 °F (36.9 °C) (Oral)   Resp 20   Ht 5' 4\" (1.626 m)   Wt 156 lb (70.8 kg)   SpO2 98%   BMI 26.78 kg/m²     General Appearance: alert and oriented to person, place and time and in no acute distress  Skin: warm and dry  Head: normocephalic and atraumatic  Eyes: pupils equal, round, and reactive to light, extraocular eye movements intact, conjunctivae normal  Neck: neck supple and non tender without mass   Pulmonary/Chest: clear to auscultation bilaterally- no wheezes, rales or rhonchi, normal air movement, no respiratory distress  Cardiovascular: normal rate, normal S1 and S2 and no carotid bruits  Abdomen: soft, non-tender, non-distended, normal bowel sounds, no masses or organomegaly  Extremities: no cyanosis, no clubbing and no edema  Neurologic: no cranial nerve deficit and speech normal        Recent Labs     10/10/22  0328 10/11/22  0400 10/12/22  0455    134 140   K 4.1 4.4 4.4    102 108*   CO2 28 23 27   BUN 12 16 14   CREATININE 0.9 0.9 0.9   GLUCOSE 95 190* 106*   CALCIUM 9.3 8.5* 8.7         Recent Labs     10/10/22  0328 10/11/22  0355 10/12/22  0455   WBC 6.7 8.7 9.9   RBC 4.10 3.92 3.55*   HGB 13.3 12.7 11.7*   HCT 40.4 38.5 35.3*   MCV 98.5 98.2 99.4   MCH 32.4 32.4 33.0   MCHC 32.9 33.0 33.1   RDW 12.3 12.1 12.8    297 304   MPV 9.2 9.4 9.4             Assessment:    Principal Problem:    Diverticulitis  Active Problems:    Acute cystitis without hematuria    Pyelonephritis    Colovesical fistula    Hyperglycemia  Resolved Problems:    * No resolved hospital problems. *      Plan:  Acute diverticulitis: Presented to the ED with complaints of nausea, dry heaves, fever. CT shows uncomplicated diverticulitis, colovesicular fistula with significantly thickened and inflamed urinary bladder concerning for cystitis and possibly extending inflammation to involve left ureter and pelvis. Laparoscopic robotic assisted left colon resection with primary anastomosis 10/10/2022. Continue regular low fiber diet. No BM yet -continue bowel regime. Continue Rocephin and oral Flagyl-General surgery and ID input appreciated. Colovesicular fistula: Secondary to recurrent episodes of diverticulitis. Continue Lao catheter. Fluoroscopic cystography on 10/13. Continue antibiotics-urology input appreciated  Pyelonephritis: Secondary to acute cystitis. Afebrile. UC positive for E. Coli. Continue Rocephin- ID following.   HLD: Hx CVA, continue Lipitor and ASA    In review of EMR, valuation, management, and diagnosis. Care plan has been discussed with attending. Time spent 25 minutes. NOTE: This report was transcribed using voice recognition software. Every effort was made to ensure accuracy; however, inadvertent computerized transcription errors may be present. Electronically signed by ARMINDA Maravilla CNP on 10/12/2022 at 3:05 PM  HOSPITALIST ATTENDING PHYSICIAN NOTE 10/12/2022 1650PM:    Details of the evaluation - subjective assessment (including medication profile, past medical, family and social history when applicable), examination, review of lab and test data, diagnostic impressions and medical decision making - performed by ARMINDA Maravilla CNP, were discussed with me on the date of service and I agree with clinical information herein unless otherwise noted. The patient has been evaluated by me personally earlier today. Pt reports no fevers, chills,n/v    Exam: heart reg at rate of 84,lungs cta, abd pos bs soft nt, ext neg for le edema    I agree with the assessment and plan of ARMINDA Maravilla CNP    Diverticulosis with hx of diverticulitis with colovesical fistula  pyelonephritis   hyperglycemia  Hyperlipidemia  hypophosphatemia         Electronically signed by Kirit Macdonald D.O.   Hospitalist  [facility] at [facility]

## 2022-10-12 NOTE — PROGRESS NOTES
[address] [place] [address] [facility]  Progress Note    SUBJECTIVE:  Chief Complaint   Patient presents with    Abdominal Pain     Has fistula between bowl and bladder    Fever     102.4 for ems      Patient is tolerating medications. No nausea, vomiting, diarrhea. Sitting up in the chair. Doing okay  Having abdominal pain  Passing gas, denies bowel movement yet  Afebrile overnight     Review of systems:  As stated above in the chief complaint, otherwise negative. Medications:  Scheduled Meds:   polyethylene glycol  17 g Oral Daily    metroNIDAZOLE  500 mg Oral 3 times per day    acetaminophen  1,000 mg Oral 3 times per day    sertraline  50 mg Oral Daily    atorvastatin  10 mg Oral Daily    cefTRIAXone (ROCEPHIN) IV  2,000 mg IntraVENous Q24H    sodium chloride flush  5-40 mL IntraVENous 2 times per day    enoxaparin  40 mg SubCUTAneous Daily     Continuous Infusions:   lactated ringers 125 mL/hr at 10/10/22 0426    sodium chloride       PRN Meds:oxyCODONE **OR** oxyCODONE, HYDROmorphone, sodium chloride, sodium chloride flush, sodium chloride, ondansetron **OR** ondansetron    OBJECTIVE:  BP (!) 182/76   Pulse 82   Temp 98.4 °F (36.9 °C) (Oral)   Resp 20   Ht 5' 4\" (1.626 m)   Wt 156 lb (70.8 kg)   SpO2 98%   BMI 26.78 kg/m²   Temp  Av.4 °F (36.9 °C)  Min: 97.8 °F (36.6 °C)  Max: 99 °F (37.2 °C)  Constitutional: The patient is awake, alert, and oriented. Sitting up in the chair. Skin: Warm and dry. No rashes were noted. HEENT: Round and reactive pupils. Moist mucous membranes. No ulcerations or thrush. Neck: Supple to movements. Chest: No respiratory distress Symmetrical expansion. Lung sounds clear. Cardiovascular: S1 and S2 are rhythmic and regular. No murmurs appreciated. Abdomen: Positive bowel sounds to auscultation. Tenderness post op, incisions clean/dry  Extremities: No edema.   Lines: peripheral  Lao catheter     Laboratory and Tests Review:  Lab Results Component Value Date    WBC 9.9 10/12/2022    WBC 8.7 10/11/2022    WBC 6.7 10/10/2022    HGB 11.7 (L) 10/12/2022    HCT 35.3 (L) 10/12/2022    MCV 99.4 10/12/2022     10/12/2022     Lab Results   Component Value Date    NEUTROABS 4.37 10/10/2022    NEUTROABS 7.42 (H) 10/04/2022    NEUTROABS 8.65 (H) 10/03/2022     No results found for: CRPHS  Lab Results   Component Value Date    ALT 15 10/03/2022    AST 16 10/03/2022    ALKPHOS 58 10/03/2022    BILITOT 0.8 10/03/2022     Lab Results   Component Value Date/Time     10/12/2022 04:55 AM    K 4.4 10/12/2022 04:55 AM    K 4.0 10/04/2022 09:15 AM     10/12/2022 04:55 AM    CO2 27 10/12/2022 04:55 AM    BUN 14 10/12/2022 04:55 AM    CREATININE 0.9 10/12/2022 04:55 AM    CREATININE 0.9 10/11/2022 04:00 AM    CREATININE 0.9 10/10/2022 03:28 AM    GFRAA >60 10/12/2022 04:55 AM    LABGLOM >60 10/12/2022 04:55 AM    GLUCOSE 106 10/12/2022 04:55 AM    PROT 6.6 10/03/2022 04:20 AM    LABALBU 3.7 10/03/2022 04:20 AM    CALCIUM 8.7 10/12/2022 04:55 AM    BILITOT 0.8 10/03/2022 04:20 AM    ALKPHOS 58 10/03/2022 04:20 AM    AST 16 10/03/2022 04:20 AM    ALT 15 10/03/2022 04:20 AM     Lab Results   Component Value Date    CRP <0.1 04/23/2018     Lab Results   Component Value Date    SEDRATE 4 09/17/2020    SEDRATE 24 (H) 04/23/2018     Radiology:  Reviewed     Microbiology:   Blood cultures: negative so far  Urine culture: >100K E.coli (pansensitive)   MRSA nares 10/11/22: negative     ASSESSMENT:  Left sided pyelonephritis from colovesical fistula: cx showed E.coli  Hx of prior diverticulitis:   Multiple antibiotic allergies: he had rash to penicillin, bactrim and cipro  Status post Laparoscopic robotic assisted left colon resection with primary anastomosis 10/10/2022     PLAN:  Continue Ceftriaxone 2g IV daily (day 10) and oral Flagyl - will discharge on orals for approximately 2 more weeks post-op   Surgery following: planning for fluoroscopic cystography 10/13/22  Labs reviewed   We will follow with you     [employer], ARMINDA - CNP  8:07 AM  10/12/2022       Pt seen and examined. Above discussed agree with advanced practice nurse. Labs, cultures, and radiographs reviewed. Face to Face encounter occurred. Changes made as necessary.      Gabriela Herrmann MD

## 2022-10-13 ENCOUNTER — APPOINTMENT (OUTPATIENT)
Dept: GENERAL RADIOLOGY | Age: 77
DRG: 330 | End: 2022-10-13
Payer: MEDICARE

## 2022-10-13 VITALS
DIASTOLIC BLOOD PRESSURE: 73 MMHG | SYSTOLIC BLOOD PRESSURE: 145 MMHG | BODY MASS INDEX: 26.63 KG/M2 | HEIGHT: 64 IN | HEART RATE: 64 BPM | WEIGHT: 156 LBS | OXYGEN SATURATION: 96 % | RESPIRATION RATE: 16 BRPM | TEMPERATURE: 98.7 F

## 2022-10-13 LAB
ANION GAP SERPL CALCULATED.3IONS-SCNC: 8 MMOL/L (ref 7–16)
BUN BLDV-MCNC: 10 MG/DL (ref 6–23)
CALCIUM SERPL-MCNC: 8.9 MG/DL (ref 8.6–10.2)
CHLORIDE BLD-SCNC: 107 MMOL/L (ref 98–107)
CO2: 25 MMOL/L (ref 22–29)
CREAT SERPL-MCNC: 0.7 MG/DL (ref 0.7–1.2)
GFR AFRICAN AMERICAN: >60
GFR NON-AFRICAN AMERICAN: >60 ML/MIN/1.73
GLUCOSE BLD-MCNC: 107 MG/DL (ref 74–99)
HCT VFR BLD CALC: 36.1 % (ref 37–54)
HEMOGLOBIN: 11.8 G/DL (ref 12.5–16.5)
MAGNESIUM: 2 MG/DL (ref 1.6–2.6)
MCH RBC QN AUTO: 31.9 PG (ref 26–35)
MCHC RBC AUTO-ENTMCNC: 32.7 % (ref 32–34.5)
MCV RBC AUTO: 97.6 FL (ref 80–99.9)
PDW BLD-RTO: 12.9 FL (ref 11.5–15)
PHOSPHORUS: 2.3 MG/DL (ref 2.5–4.5)
PLATELET # BLD: 307 E9/L (ref 130–450)
PMV BLD AUTO: 9.1 FL (ref 7–12)
POTASSIUM SERPL-SCNC: 4.1 MMOL/L (ref 3.5–5)
RBC # BLD: 3.7 E12/L (ref 3.8–5.8)
SODIUM BLD-SCNC: 140 MMOL/L (ref 132–146)
WBC # BLD: 9 E9/L (ref 4.5–11.5)

## 2022-10-13 PROCEDURE — 2580000003 HC RX 258: Performed by: STUDENT IN AN ORGANIZED HEALTH CARE EDUCATION/TRAINING PROGRAM

## 2022-10-13 PROCEDURE — 74430 CONTRAST X-RAY BLADDER: CPT

## 2022-10-13 PROCEDURE — 83735 ASSAY OF MAGNESIUM: CPT

## 2022-10-13 PROCEDURE — 6370000000 HC RX 637 (ALT 250 FOR IP): Performed by: REGISTERED NURSE

## 2022-10-13 PROCEDURE — 6360000002 HC RX W HCPCS: Performed by: STUDENT IN AN ORGANIZED HEALTH CARE EDUCATION/TRAINING PROGRAM

## 2022-10-13 PROCEDURE — 6370000000 HC RX 637 (ALT 250 FOR IP): Performed by: STUDENT IN AN ORGANIZED HEALTH CARE EDUCATION/TRAINING PROGRAM

## 2022-10-13 PROCEDURE — 36415 COLL VENOUS BLD VENIPUNCTURE: CPT

## 2022-10-13 PROCEDURE — 85027 COMPLETE CBC AUTOMATED: CPT

## 2022-10-13 PROCEDURE — 99239 HOSP IP/OBS DSCHRG MGMT >30: CPT | Performed by: NURSE PRACTITIONER

## 2022-10-13 PROCEDURE — 6360000004 HC RX CONTRAST MEDICATION: Performed by: RADIOLOGY

## 2022-10-13 PROCEDURE — 80048 BASIC METABOLIC PNL TOTAL CA: CPT

## 2022-10-13 PROCEDURE — 99239 HOSP IP/OBS DSCHRG MGMT >30: CPT | Performed by: INTERNAL MEDICINE

## 2022-10-13 PROCEDURE — 84100 ASSAY OF PHOSPHORUS: CPT

## 2022-10-13 RX ORDER — CEFDINIR 300 MG/1
300 CAPSULE ORAL 2 TIMES DAILY
Qty: 20 CAPSULE | Refills: 0 | Status: SHIPPED | OUTPATIENT
Start: 2022-10-13 | End: 2022-10-13 | Stop reason: SDUPTHER

## 2022-10-13 RX ORDER — METRONIDAZOLE 500 MG/1
500 TABLET ORAL EVERY 8 HOURS SCHEDULED
Qty: 30 TABLET | Refills: 0 | Status: SHIPPED | OUTPATIENT
Start: 2022-10-13 | End: 2022-10-23

## 2022-10-13 RX ORDER — METRONIDAZOLE 500 MG/1
500 TABLET ORAL EVERY 8 HOURS SCHEDULED
Qty: 30 TABLET | Refills: 0 | Status: SHIPPED | OUTPATIENT
Start: 2022-10-13 | End: 2022-10-13 | Stop reason: SDUPTHER

## 2022-10-13 RX ORDER — CEFDINIR 300 MG/1
300 CAPSULE ORAL 2 TIMES DAILY
Qty: 20 CAPSULE | Refills: 0 | Status: SHIPPED | OUTPATIENT
Start: 2022-10-13 | End: 2022-10-23

## 2022-10-13 RX ADMIN — ACETAMINOPHEN 1000 MG: 500 TABLET ORAL at 08:51

## 2022-10-13 RX ADMIN — IOTHALAMATE MEGLUMINE 250 ML: 172 INJECTION URETERAL at 15:23

## 2022-10-13 RX ADMIN — SODIUM CHLORIDE, PRESERVATIVE FREE 10 ML: 5 INJECTION INTRAVENOUS at 08:53

## 2022-10-13 RX ADMIN — SERTRALINE HYDROCHLORIDE 50 MG: 50 TABLET ORAL at 08:53

## 2022-10-13 RX ADMIN — ACETAMINOPHEN 1000 MG: 500 TABLET ORAL at 13:47

## 2022-10-13 RX ADMIN — ONDANSETRON 4 MG: 2 INJECTION INTRAMUSCULAR; INTRAVENOUS at 08:52

## 2022-10-13 RX ADMIN — ENOXAPARIN SODIUM 40 MG: 100 INJECTION SUBCUTANEOUS at 08:51

## 2022-10-13 RX ADMIN — METRONIDAZOLE 500 MG: 500 TABLET ORAL at 05:34

## 2022-10-13 RX ADMIN — METRONIDAZOLE 500 MG: 500 TABLET ORAL at 13:47

## 2022-10-13 RX ADMIN — WATER 2000 MG: 1 INJECTION INTRAMUSCULAR; INTRAVENOUS; SUBCUTANEOUS at 08:52

## 2022-10-13 NOTE — CARE COORDINATION
Updated plan of care. POD#3. Lao in place. Possible cystogram today. Ambulating. Iv antibiotics. Plan will be home with no needs.  Iv will switch to PO-mjo

## 2022-10-13 NOTE — DISCHARGE SUMMARY
[facility] Physician Discharge Summary       Ruth Naranjo MD  [facility]  [address] [id number]    Follow up in 2 week(s)      Tyshawn Walker MD  [address] [facility] [address]  [address] [phone]    Follow up in 2 week(s)      Kahlil Garcia MD  [address] [facility] [address] [facility]  [phone]    Follow up      Activity level: As tolerated     Dispo:Home      Condition on discharge: Stable     Patient ID:  Conchita Portal  [id number]  78 y.o.  1945    Admit date: 10/2/2022    Discharge date and time:  10/13/2022  4:08 PM    Admission Diagnoses: Principal Problem:    Diverticulitis  Active Problems:    Acute cystitis without hematuria    Pyelonephritis    Colovesical fistula    Hyperglycemia    Hypophosphatemia  Resolved Problems:    * No resolved hospital problems. *      Discharge Diagnoses: Principal Problem:    Diverticulitis  Active Problems:    Acute cystitis without hematuria    Pyelonephritis    Colovesical fistula    Hyperglycemia    Hypophosphatemia  Resolved Problems:    * No resolved hospital problems. *      Consults:  IP CONSULT TO GENERAL SURGERY  IP CONSULT TO GENERAL SURGERY  IP CONSULT TO UROLOGY  IP CONSULT TO INFECTIOUS DISEASES    Procedures: L Hemiolectomy 10/10    Hospital Course:   Presented to the ED on 10/2 with complaints of fever and chills. Patient noted to have recurrent diverticulitis and a colovesicular fistula. Patient was being worked up outpatient and had colonoscopy 3 days prior to admission   ED work-up is as follows CT scan showed diverticulosis with focal thickening of the sigmoid colon which may be uncomplicated diverticulitis, fistula was significantly thickened and inflamed ureteric bladder concerning for cystitis and extending inflammation to involve the left ureter and the left renal pelvis with possible pyelonephritis. UA was positive.   No leukocytosis upon presentation to the ED patient was admitted for further management. On admission ID was consulted patient was placed on Rocephin and p.o. Flagyl. Urine culture tested positive for E. coli. Seen inpatient by general surgery. On 10/10/2022 he was taken for a laparoscopic robotic assisted left colon resection with primary anastomosis. Patient is now 2 days postop with minimal complaints of pain. Patient is eating without difficulty and has moved his bowels twice. Cystogram was completed today showing no leak. Lao catheter was removed. Iv Rocephin was transitioned to Cefdinir and he was continued on oral Flagyl for 10 more days. He is to follow up outpatient with ID, general surgery and Urology.      Discharge Exam:  General Appearance: alert and oriented to person, place and time and in no acute distress  Skin: warm and dry  Head: normocephalic and atraumatic  Eyes: pupils equal, round, and reactive to light, extraocular eye movements intact, conjunctivae normal  Neck: neck supple and non tender without mass   Pulmonary/Chest: clear to auscultation bilaterally- no wheezes, rales or rhonchi, normal air movement, no respiratory distress  Cardiovascular: normal rate, normal S1 and S2 and no carotid bruits  Abdomen: soft, non-tender, non-distended, normal bowel sounds, no masses or organomegaly  Extremities: no cyanosis, no clubbing and no edema  Neurologic: no cranial nerve deficit and speech normal    I/O last 3 completed shifts:  In: -   Out: 5700 [Urine:5700]  I/O this shift:  In: -   Out: 950 [Urine:950]      LABS:  Recent Labs     10/11/22  0400 10/12/22  0455 10/13/22  0502    140 140   K 4.4 4.4 4.1    108* 107   CO2 23 27 25   BUN 16 14 10   CREATININE 0.9 0.9 0.7   GLUCOSE 190* 106* 107*   CALCIUM 8.5* 8.7 8.9       Recent Labs     10/11/22  0355 10/12/22  0455 10/13/22  0502   WBC 8.7 9.9 9.0   RBC 3.92 3.55* 3.70*   HGB 12.7 11.7* 11.8*   HCT 38.5 35.3* 36.1*   MCV 98.2 99.4 97.6   MCH 32.4 33.0 31.9   MCHC 33.0 33.1 32.7 RDW 12.1 12.8 12.9    304 307   MPV 9.4 9.4 9.1       No results for input(s): POCGLU in the last 72 hours. Imaging:  CT ABDOMEN PELVIS W IV CONTRAST Additional Contrast? None    Result Date: 10/2/2022  EXAMINATION: CT OF THE ABDOMEN AND PELVIS WITH CONTRAST 10/2/2022 6:50 pm TECHNIQUE: CT of the abdomen and pelvis was performed with the administration of intravenous contrast. Multiplanar reformatted images are provided for review. Automated exposure control, iterative reconstruction, and/or weight based adjustment of the mA/kV was utilized to reduce the radiation dose to as low as reasonably achievable. COMPARISON: April 8, 2018 HISTORY: ORDERING SYSTEM PROVIDED HISTORY: left flank pain, known fistula TECHNOLOGIST PROVIDED HISTORY: Additional Contrast?->None Reason for exam:->left flank pain, known fistula Decision Support Exception - unselect if not a suspected or confirmed emergency medical condition->Emergency Medical Condition (MA) FINDINGS: The lung bases demonstrate atelectasis. Liver is normal.  Gallbladder is partially distended with gallstones. Spleen, pancreas, the adrenals and the right kidney are normal.  There is hydronephrosis and edema in the left kidney with distended and thickened ureter with inflammatory changes surrounding the renal hilum and ureter. A 4 cm cystic lesion in the left kidney is noted. Degenerative changes are identified in the lumbar spine. Pelvis. Bladder is collapsed with the wall thickening. There is diverticulosis of the colon with focal thickening of the sigmoid colon. A fistula is noted between the sigmoid colon in the bladder which may be patent. There is constipation with retained fecal matter in the colon. The appendix is normal.     Diverticulosis of colon with focal thickening of the sigmoid colon which may be due to uncomplicated diverticulitis. Colonoscopy may be considered when feasible to exclude underlying malignancy.  Colovesical fistula with significantly thickened and inflamed urinary bladder concerning for cystitis with possibly  extending inflammation  to involve the left ureter extending to left renal pelvis resulting in ureteritis and possibly pyelonephritis. Please correlate with urinalysis. XR CHEST PORTABLE    Result Date: 10/2/2022  EXAMINATION: ONE XRAY VIEW OF THE CHEST 10/2/2022 5:22 pm COMPARISON: Prior study dated 08/28/2013 HISTORY: ORDERING SYSTEM PROVIDED HISTORY: ro sepsis/fever TECHNOLOGIST PROVIDED HISTORY: Reason for exam:->ro sepsis/fever FINDINGS: The lungs are without acute focal process. There is no effusion or pneumothorax. The cardiomediastinal silhouette is without acute process. The osseous structures are without acute process. There is diminished inspiratory effort. No acute process.        Patient Instructions:      Medication List        START taking these medications      cefdinir 300 MG capsule  Commonly known as: OMNICEF  Take 1 capsule by mouth 2 times daily for 10 days     metroNIDAZOLE 500 MG tablet  Commonly known as: FLAGYL  Take 1 tablet by mouth every 8 hours for 10 days            CONTINUE taking these medications      Apple Cider Vinegar 500 MG Tabs     aspirin 81 MG tablet     b complex vitamins capsule     EPINEPHrine 0.3 MG/0.3ML Soaj injection  Commonly known as: EpiPen 2-Reji  Inject 0.3 mLs into the muscle once for 1 dose Use as directed for allergic reaction     fish oil 1000 MG capsule     magnesium oxide 400 MG tablet  Commonly known as: MAG-OX     Potassium 99 MG Tabs     sertraline 50 MG tablet  Commonly known as: ZOLOFT     simvastatin 10 MG tablet  Commonly known as: ZOCOR  Take 2 tablets by mouth nightly               Where to Get Your Medications        These medications were sent to [address] [place] [address] [address]      Phone: [phone]   cefdinir 300 MG capsule  metroNIDAZOLE 500 MG tablet           Note that more than 30 minutes was spent in preparing discharge papers, discussing discharge with patient, medication review, etc.    Signed:  Electronically signed by ARMINDA Diallo CNP on 10/13/2022 at 4:08 PM

## 2022-10-13 NOTE — PROGRESS NOTES
P Quality Flow/Interdisciplinary Rounds Progress Note        Quality Flow Rounds held on October 13, 2022    Disciplines Attending:  Bedside Nurse, , , and Nursing Unit Leadership    Rianna Lyman was admitted on 10/2/2022  4:51 PM    Anticipated Discharge Date:       Disposition:    Dani Score:  Dani Scale Score: 23    Readmission Risk              Risk of Unplanned Readmission:  11           Discussed patient goal for the day, patient clinical progression, and barriers to discharge.   The following Goal(s) of the Day/Commitment(s) have been identified:   Discharge [address] XI Meng  October 13, 2022

## 2022-10-13 NOTE — PROGRESS NOTES
[facility] Progress Note    Admitting Date and Time: 10/2/2022  4:51 PM  Admit Dx: Diverticulitis [K57.92]  Diverticulitis of colon [K57.32]  Acute cystitis without hematuria [N30.00]    Subjective:  Patient is being followed for Diverticulitis [K57.92]  Diverticulitis of colon [K57.32]  Acute cystitis without hematuria [N30.00]     Patient seen sitting on edge of bed. He is alert and in no distress. He has been up walking the halls this morning without issues. Patient had small BM last night and large BM today. He is eating without issues. He notes minimal pain around incisions site. No issues overnight. ROS: denies fever, chills, cp, sob, n/v, HA unless stated above.       polyethylene glycol  17 g Oral Daily    metroNIDAZOLE  500 mg Oral 3 times per day    acetaminophen  1,000 mg Oral 3 times per day    sertraline  50 mg Oral Daily    atorvastatin  10 mg Oral Daily    cefTRIAXone (ROCEPHIN) IV  2,000 mg IntraVENous Q24H    sodium chloride flush  5-40 mL IntraVENous 2 times per day    enoxaparin  40 mg SubCUTAneous Daily     oxyCODONE, 5 mg, Q4H PRN   Or  oxyCODONE, 10 mg, Q4H PRN  HYDROmorphone, 0.25 mg, Q4H PRN  sodium chloride, 1 spray, Q4H PRN  sodium chloride flush, 5-40 mL, PRN  sodium chloride, , PRN  ondansetron, 4 mg, Q8H PRN   Or  ondansetron, 4 mg, Q6H PRN       Objective:    BP (!) 172/74   Pulse 90   Temp 98.6 °F (37 °C) (Oral)   Resp 16   Ht 5' 4\" (1.626 m)   Wt 156 lb (70.8 kg)   SpO2 96%   BMI 26.78 kg/m²     General Appearance: alert and oriented to person, place and time and in no acute distress  Skin: warm and dry  Head: normocephalic and atraumatic  Eyes: pupils equal, round, and reactive to light, extraocular eye movements intact, conjunctivae normal  Neck: neck supple and non tender without mass   Pulmonary/Chest: clear to auscultation bilaterally- no wheezes, rales or rhonchi, normal air movement, no respiratory distress  Cardiovascular: normal rate, normal S1 and S2 and no carotid bruits  Abdomen: soft, non-tender, non-distended, normal bowel sounds, no masses or organomegaly. Extremities: no cyanosis, no clubbing and no edema  Neurologic: no cranial nerve deficit and speech normal        Recent Labs     10/11/22  0400 10/12/22  0455 10/13/22  0502    140 140   K 4.4 4.4 4.1    108* 107   CO2 23 27 25   BUN 16 14 10   CREATININE 0.9 0.9 0.7   GLUCOSE 190* 106* 107*   CALCIUM 8.5* 8.7 8.9         Recent Labs     10/11/22  0355 10/12/22  0455 10/13/22  0502   WBC 8.7 9.9 9.0   RBC 3.92 3.55* 3.70*   HGB 12.7 11.7* 11.8*   HCT 38.5 35.3* 36.1*   MCV 98.2 99.4 97.6   MCH 32.4 33.0 31.9   MCHC 33.0 33.1 32.7   RDW 12.1 12.8 12.9    304 307   MPV 9.4 9.4 9.1             Assessment:    Principal Problem:    Diverticulitis  Active Problems:    Acute cystitis without hematuria    Pyelonephritis    Colovesical fistula    Hyperglycemia    Hypophosphatemia  Resolved Problems:    * No resolved hospital problems. *      Plan:  Acute diverticulitis: Presented to the ED with complaints of nausea, dry heaves, fever. CT shows uncomplicated diverticulitis, colovesicular fistula with significantly thickened and inflamed urinary bladder concerning for cystitis and possibly extending inflammation to involve left ureter and pelvis. Laparoscopic robotic assisted left colon resection with primary anastomosis 10/10/2022. Continue regular low fiber diet. Moving bowels without issue. Continue Rocephin and oral Flagyl-will transition to oral antibiotics for discharge-General surgery and ID input appreciated. Colovesicular fistula: Secondary to recurrent episodes of diverticulitis. Cystogram 10/13 normal. Continue antibiotics. Would appreciate urology's input on Lao catheter removal.  Pyelonephritis: Secondary to acute cystitis. Afebrile. UC positive for E. Coli. Continue Rocephin- ID following.   HLD: Hx CVA, continue Lipitor and ASA    Disposition: Discharge home on oral antibiotics when okay with general surgery, urology, and ID. In review of EMR, valuation, management, and diagnosis. Care plan has been discussed with attending. Time spent 25 minutes. NOTE: This report was transcribed using voice recognition software. Every effort was made to ensure accuracy; however, inadvertent computerized transcription errors may be present.   Electronically signed by ARMINDA Garner CNP on 10/13/2022 at 10:26 AM

## 2022-10-13 NOTE — PROGRESS NOTES
Called Ishan Schwab per RN [facility].  reason Patient Update

## 2022-10-13 NOTE — PROGRESS NOTES
Notified Dr. July Parkinson of negative cystogram. Placed telephone order to remove catheter and signed off of case. Called ID answering service and left message asking them to add antibiotics for discharge.

## 2022-10-13 NOTE — PROGRESS NOTES
GENERAL SURGERY  DAILY PROGRESS NOTE  10/13/2022  Chief Complaint   Patient presents with    Abdominal Pain     Has fistula between bowl and bladder    Fever     102.4 for ems        Subjective:  No acute events overnight. Tolerating low fiber diet. Patient continuing to pass flatus. Patient states abdominal pain is well controlled currently. Patient had one small BM overnight that is semisolid. AFHS, AM labs pending.      Objective:  BP (!) 159/76   Pulse 64   Temp 98.6 °F (37 °C) (Oral)   Resp 16   Ht 5' 4\" (1.626 m)   Wt 156 lb (70.8 kg)   SpO2 96%   BMI 26.78 kg/m²     In: -   Out: 5700 [Urine:5700]    General appearance: NAD, appears stated age  Lungs: Equal chest rise bilaterally, no retractions, no audible wheezing  Heart: warm throughout  Abdomen: soft, appropriately tender, ND, incisions clean/dry/intact  Skin: no obvious rashes or lesions appreciated    CBC x3  Recent Labs     10/11/22  0355 10/12/22  0455   WBC 8.7 9.9   RBC 3.92 3.55*   HGB 12.7 11.7*   HCT 38.5 35.3*   MCV 98.2 99.4   MCH 32.4 33.0   MCHC 33.0 33.1   RDW 12.1 12.8    304   MPV 9.4 9.4         BMP x3  Recent Labs     10/11/22  0400 10/12/22  0455    140   K 4.4 4.4    108*   CO2 23 27   BUN 16 14   CREATININE 0.9 0.9   GLUCOSE 190* 106*   CALCIUM 8.5* 8.7           Assessment/Plan:  78 y.o. male with pyelonephritis on IV abx with known colovesicular fistula in the setting of multiple prior episodes of diverticulitis POD #1 laparascopic robotic assisted left colon resection with primary anastomosis    - Continue IVF   - Continue low fiber diet  - Continue to monitor abdominal exam  - ID recommendations on Rocephin flagyl currently, awaiting recs for dc with plans to discharge on 2 weeks of p.o. antibiotics per ID recommendation.  - Ambulate   - Pain and nausea control as needed  - DO NOT remove kowalski catheter, plan for fluoroscopic cystography 10/13  - bowel reg  - appreciate urology recs for catheter Desire Heart DO  General Surgery Resident, PGY-1    Electronically signed on 10/13/2022 at 5:14 AM

## 2022-10-13 NOTE — PROGRESS NOTES
I was called that cystogram is negative and RN got orders from surgery that kowalski can be removed and pt can be discharged if ok with us. Ok to discharge the patient on cefdinir and flagyl for 10 more days. Scripts send to [address] [facility] in [place] [address]. See me in 2 weeks.    He needs to follow up with urology regarding stent removal.     Rose Schwartz MD

## 2022-10-13 NOTE — PLAN OF CARE
Problem: Discharge Planning  Goal: Discharge to home or other facility with appropriate resources  10/13/2022 1832 by Jin Bustos RN  Outcome: Completed  10/13/2022 1143 by Jin Bustos RN  Outcome: Progressing     Problem: Pain  Goal: Verbalizes/displays adequate comfort level or baseline comfort level  10/13/2022 1832 by Jin Bustos RN  Outcome: Completed  10/13/2022 1143 by Jin Bustos RN  Outcome: Progressing     Problem: Safety - Adult  Goal: Free from fall injury  10/13/2022 1832 by Jin Bustos RN  Outcome: Completed  10/13/2022 1143 by Jin Bustos RN  Outcome: Progressing     Problem: ABCDS Injury Assessment  Goal: Absence of physical injury  10/13/2022 1832 by Jin Bustos RN  Outcome: Completed  10/13/2022 1143 by Jin Bustos RN  Outcome: Progressing

## 2022-10-13 NOTE — PROGRESS NOTES
[address] [place] [address] [facility]  Progress Note    SUBJECTIVE:  Chief Complaint   Patient presents with    Abdominal Pain     Has fistula between bowl and bladder    Fever     102.4 for ems      Patient is tolerating medications. No nausea, vomiting, diarrhea. In bed, doing well  No new complaints at this time   Afebrile overnight     Review of systems:  As stated above in the chief complaint, otherwise negative. Medications:  Scheduled Meds:   polyethylene glycol  17 g Oral Daily    metroNIDAZOLE  500 mg Oral 3 times per day    acetaminophen  1,000 mg Oral 3 times per day    sertraline  50 mg Oral Daily    atorvastatin  10 mg Oral Daily    cefTRIAXone (ROCEPHIN) IV  2,000 mg IntraVENous Q24H    sodium chloride flush  5-40 mL IntraVENous 2 times per day    enoxaparin  40 mg SubCUTAneous Daily     Continuous Infusions:   sodium chloride       PRN Meds:oxyCODONE **OR** oxyCODONE, HYDROmorphone, sodium chloride, sodium chloride flush, sodium chloride, ondansetron **OR** ondansetron    OBJECTIVE:  BP (!) 172/74   Pulse 90   Temp 98.6 °F (37 °C) (Oral)   Resp 16   Ht 5' 4\" (1.626 m)   Wt 156 lb (70.8 kg)   SpO2 96%   BMI 26.78 kg/m²   Temp  Av.7 °F (37.1 °C)  Min: 98.6 °F (37 °C)  Max: 98.9 °F (37.2 °C)  Constitutional: The patient is awake, alert, and oriented. In bed. Skin: Warm and dry. No rashes were noted. HEENT: Round and reactive pupils. Moist mucous membranes. No ulcerations or thrush. Neck: Supple to movements. Chest: No respiratory distress Symmetrical expansion. Lung sounds clear. Cardiovascular: S1 and S2 are rhythmic and regular. No murmurs appreciated. Abdomen: Positive bowel sounds to auscultation. Tenderness post op, incisions clean/dry  Extremities: No edema.   Lines: peripheral  Lao catheter     Laboratory and Tests Review:  Lab Results   Component Value Date    WBC 9.0 10/13/2022    WBC 9.9 10/12/2022    WBC 8.7 10/11/2022    HGB 11.8 (L) 10/13/2022 HCT 36.1 (L) 10/13/2022    MCV 97.6 10/13/2022     10/13/2022     Lab Results   Component Value Date    NEUTROABS 4.37 10/10/2022    NEUTROABS 7.42 (H) 10/04/2022    NEUTROABS 8.65 (H) 10/03/2022     No results found for: CRPHS  Lab Results   Component Value Date    ALT 15 10/03/2022    AST 16 10/03/2022    ALKPHOS 58 10/03/2022    BILITOT 0.8 10/03/2022     Lab Results   Component Value Date/Time     10/13/2022 05:02 AM    K 4.1 10/13/2022 05:02 AM    K 4.0 10/04/2022 09:15 AM     10/13/2022 05:02 AM    CO2 25 10/13/2022 05:02 AM    BUN 10 10/13/2022 05:02 AM    CREATININE 0.7 10/13/2022 05:02 AM    CREATININE 0.9 10/12/2022 04:55 AM    CREATININE 0.9 10/11/2022 04:00 AM    GFRAA >60 10/13/2022 05:02 AM    LABGLOM >60 10/13/2022 05:02 AM    GLUCOSE 107 10/13/2022 05:02 AM    PROT 6.6 10/03/2022 04:20 AM    LABALBU 3.7 10/03/2022 04:20 AM    CALCIUM 8.9 10/13/2022 05:02 AM    BILITOT 0.8 10/03/2022 04:20 AM    ALKPHOS 58 10/03/2022 04:20 AM    AST 16 10/03/2022 04:20 AM    ALT 15 10/03/2022 04:20 AM     Lab Results   Component Value Date    CRP <0.1 04/23/2018     Lab Results   Component Value Date    SEDRATE 4 09/17/2020    SEDRATE 24 (H) 04/23/2018     Radiology:  Reviewed     Microbiology:   Blood cultures: negative so far  Urine culture: >100K E.coli (pansensitive)   MRSA nares 10/11/22: negative     ASSESSMENT:  Left sided pyelonephritis from colovesical fistula: cx showed E.coli  Hx of prior diverticulitis:   Multiple antibiotic allergies: he had rash to penicillin, bactrim and cipro  Status post Laparoscopic robotic assisted left colon resection with primary anastomosis 10/10/2022     PLAN:  Continue Ceftriaxone 2g IV daily (day 11) and oral Flagyl - will discharge on orals for approximately 2 more weeks post-op   Surgery following: planning for fluoroscopic cystography 10/13/22  Labs reviewed   We will follow with you     ARMINDA Andrews - CNP  10:25 AM  10/13/2022     Pt seen and examined. Above discussed agree with advanced practice nurse. Labs, cultures, and radiographs reviewed. Face to Face encounter occurred. Changes made as necessary.      Chuck Alejo MD

## 2023-06-02 ENCOUNTER — HOSPITAL ENCOUNTER (OUTPATIENT)
Age: 78
Discharge: HOME | End: 2023-06-02
Payer: MEDICARE

## 2023-06-02 LAB — PSA SERPL-MCNC: <0.03 NG/ML (ref 0–4)

## 2023-06-02 PROCEDURE — 36415 COLL VENOUS BLD VENIPUNCTURE: CPT

## 2023-06-02 PROCEDURE — 84153 ASSAY OF PSA TOTAL: CPT

## 2023-11-21 ENCOUNTER — OFFICE VISIT (OUTPATIENT)
Dept: PODIATRY | Age: 78
End: 2023-11-21
Payer: MEDICARE

## 2023-11-21 VITALS
DIASTOLIC BLOOD PRESSURE: 78 MMHG | WEIGHT: 156 LBS | TEMPERATURE: 97.3 F | SYSTOLIC BLOOD PRESSURE: 123 MMHG | BODY MASS INDEX: 26.78 KG/M2

## 2023-11-21 DIAGNOSIS — M79.671 PAIN IN BOTH FEET: ICD-10-CM

## 2023-11-21 DIAGNOSIS — S93.492A SPRAIN OF ANTERIOR TALOFIBULAR LIGAMENT OF LEFT ANKLE, INITIAL ENCOUNTER: Primary | ICD-10-CM

## 2023-11-21 DIAGNOSIS — G62.9 NEUROPATHY: ICD-10-CM

## 2023-11-21 DIAGNOSIS — M79.672 PAIN IN BOTH FEET: ICD-10-CM

## 2023-11-21 PROCEDURE — 3078F DIAST BP <80 MM HG: CPT | Performed by: PODIATRIST

## 2023-11-21 PROCEDURE — 1123F ACP DISCUSS/DSCN MKR DOCD: CPT | Performed by: PODIATRIST

## 2023-11-21 PROCEDURE — 20605 DRAIN/INJ JOINT/BURSA W/O US: CPT | Performed by: PODIATRIST

## 2023-11-21 PROCEDURE — 3074F SYST BP LT 130 MM HG: CPT | Performed by: PODIATRIST

## 2023-11-21 PROCEDURE — 99204 OFFICE O/P NEW MOD 45 MIN: CPT | Performed by: PODIATRIST

## 2023-11-21 RX ORDER — TRIAMCINOLONE ACETONIDE 40 MG/ML
40 INJECTION, SUSPENSION INTRA-ARTICULAR; INTRAMUSCULAR ONCE
Status: COMPLETED | OUTPATIENT
Start: 2023-11-21 | End: 2023-11-21

## 2023-11-21 RX ORDER — GABAPENTIN 100 MG/1
100 CAPSULE ORAL 2 TIMES DAILY
Qty: 120 CAPSULE | Refills: 0 | Status: SHIPPED | OUTPATIENT
Start: 2023-11-21 | End: 2024-01-20

## 2023-11-21 RX ADMIN — TRIAMCINOLONE ACETONIDE 40 MG: 40 INJECTION, SUSPENSION INTRA-ARTICULAR; INTRAMUSCULAR at 12:36

## 2023-11-21 NOTE — PROGRESS NOTES
23  Enma Iverson : 1945 Sex: male  Age: 70 y.o. Patient was referred by Rani Puente MD    Chief Complaint   Patient presents with    Foot Pain     Pt was last seen in 2019 presents today for new issue possible sprain left ankle  Rani Puente MD  Office did xrays showing arthritis  Pt has cramps in both feet     Numbness     Pt gets numbness in toes        SUBJECTIVE patient was seen several years ago seen for new issues patient relates that his left ankle has been bothering him for several months no trauma noted patient is also here for numbness in feet his neuropathy has progressed and gotten worse  HPI  Review of Systems  Const: Denies constitutional symptoms  Musculo: Denies symptoms other than stated above  Skin: Denies symptoms other than stated above       Current Outpatient Medications:     gabapentin (NEURONTIN) 100 MG capsule, Take 1 capsule by mouth 2 times daily for 60 days. Intended supply: 30 days, Disp: 120 capsule, Rfl: 0    simvastatin (ZOCOR) 10 MG tablet, Take 2 tablets by mouth nightly, Disp: 180 tablet, Rfl: 1    EPINEPHrine (EPIPEN 2-ALTA) 0.3 MG/0.3ML SOAJ injection, Inject 0.3 mLs into the muscle once for 1 dose Use as directed for allergic reaction, Disp: 2 each, Rfl: 3    magnesium oxide (MAG-OX) 400 MG tablet, Take 1 tablet by mouth daily, Disp: , Rfl:     Apple Cider Vinegar 500 MG TABS, Take by mouth daily, Disp: , Rfl:     Potassium 99 MG TABS, Take by mouth at bedtime, Disp: , Rfl:     aspirin 81 MG tablet, Take 1 tablet by mouth daily Ld 2019 per dr. Gilbert De La Paz, Disp: , Rfl:     Omega-3 Fatty Acids (FISH OIL) 1000 MG CAPS, Take 1 capsule by mouth daily Ld 2019, Disp: , Rfl:     b complex vitamins capsule, Take 1 capsule by mouth daily Ld 2019, Disp: , Rfl:   Allergies   Allergen Reactions    Ciprofloxacin Anaphylaxis    Penicillins Rash    Bactrim [Sulfamethoxazole-Trimethoprim] Other (See Comments)     ?     Bee Venom Other (See Comments)       Past

## 2023-11-22 LAB
ANION GAP SERPL CALCULATED.3IONS-SCNC: 14 MMOL/L (ref 7–16)
BUN BLDV-MCNC: 18 MG/DL (ref 6–23)
CALCIUM SERPL-MCNC: 10.1 MG/DL (ref 8.6–10.2)
CHLORIDE BLD-SCNC: 103 MMOL/L (ref 98–107)
CO2: 25 MMOL/L (ref 22–29)
CREAT SERPL-MCNC: 0.9 MG/DL (ref 0.7–1.2)
GFR SERPL CREATININE-BSD FRML MDRD: >60 ML/MIN/1.73M2
GLUCOSE BLD-MCNC: 107 MG/DL (ref 74–99)
POTASSIUM SERPL-SCNC: 4.7 MMOL/L (ref 3.5–5)
RHEUMATOID FACTOR: <10 IU/ML (ref 0–13)
SEDIMENTATION RATE, ERYTHROCYTE: 7 MM/HR (ref 0–15)
SODIUM BLD-SCNC: 142 MMOL/L (ref 132–146)

## 2024-06-11 LAB — PSA SERPL-MCNC: 0.02 NG/ML (ref 0–4)

## 2025-02-26 NOTE — PROGRESS NOTES
Patient was reminded throughout day that we are measuring his output and to void in urinal. Voided several times and did not measure Progress Note - Dictate


Date Seen:  Feb 26, 2025


Medical Necessity Reason


Pt with a Central, PICC or Fol:  Yes


The following are medically ne:  Cuevas Catheter


Reason for cuevas catheter:  Strict I&O


Subjective


Patient seen and examined at bedside.


Remains on supplemental oxygen


Overnight events reviewed.


vital signs





                                   Vital Sign








  Date Time  Temp Pulse Resp B/P (MAP) Pulse Ox O2 Delivery O2 Flow Rate FiO2


 


2/26/25 21:37  91  107/69    


 


2/26/25 21:00 98.3  19  96   





 98.3       


 


2/26/25 20:00      Nasal Cannula* 5 40














                           Total Intake and Output   


 


 2/25/25 2/25/25 2/26/25





 15:00 23:00 07:00


 


Intake Total 50 ml 1070 ml 800 ml


 


Output Total 2000 ml 1900 ml 800 ml


 


Balance -1950 ml -830 ml 0 ml








medications





                               Current Medications








 Medications  Dose


 Ordered  Sig/Hardy


 Route  Start Time


 Stop Time Status Last Admin


Dose Admin


 


 Carvedilol  12.5 mg  Q12HR


 PO  2/22/25 10:00


    2/26/25 21:37


12.5 MG


 


 Levalbuterol HCl  1.25 mg  Q6HR


 NEB  2/22/25 06:00


    2/26/25 18:38


1.25 MG


 


 Ipratropium


 Bromide  0.5 mg  Q4HPRN  PRN


 NEB  2/22/25 04:15


    2/26/25 11:52


0.5 MG


 


 Aspirin  81 mg  DAILY


 PO  2/23/25 10:00


    2/26/25 08:59


81 MG


 


 Sodium Chloride  10 ml  Q8HR


 IV  2/22/25 06:00


    2/26/25 21:37


10 ML


 


 Acetaminophen/


 Hydrocodone Bitart  1 tab  Q4HP  PRN


 PO  2/22/25 04:15


    2/25/25 21:34


1 TAB


 


 Ondansetron HCl  4 mg  Q4HP  PRN


 IV  2/22/25 04:15


     





 


 Docusate Sodium  100 mg  BIDPRN  PRN


 PO  2/22/25 04:15


     





 


 Acetaminophen  650 mg  Q6HP  PRN


 PO  2/22/25 04:15


    2/23/25 01:06


650 MG


 


 Nitroglycerin  0.4 mg  Q5MINP  PRN


 SL  2/22/25 04:15


     





 


 Morphine Sulfate  2 mg  Q30M  PRN


 IV  2/22/25 04:15


     





 


 Furosemide  40 mg  DAILY


 IV  2/23/25 10:00


   UNV  





 


 Furosemide  40 mg  DAILY


 IV  2/23/25 10:00


    2/26/25 08:58


40 MG


 


 Melatonin  5 mg  HS  PRN


 PO  2/25/25 22:15


    2/26/25 21:36


5 MG


 


 Guaifenesin  200 mg  Q8HP  PRN


 PO  2/25/25 22:15


    2/26/25 19:44


200 MG


 


 Sacubitril/


 Valsartan  1 tab  BID


 PO  2/26/25 22:00


    2/26/25 21:37


1 TAB


 


 Apixaban  5 mg  BIDPC


 PO  2/26/25 19:00


    2/26/25 19:44


5 MG








objective


Gen.: Patient lying in bed in no apparent distress. On supplemental oxygen.


Head: Normocephalic, atraumatic.


Eyes: EOMI/PERRLA.


Ears: Normal hearing. Normal anatomy.


Neck/trachea: Trachea midline, supple.


Nose: Normal external anatomy.


Mouth: Moist mucous membranes.


Chest: Decreased air entry bilaterally. No wheezing or rhonchi.


Cardiovascular: Positive S1, positive S2. Regular rate and rhythm.


Abdomen: Positive bowel sounds in all 4 quadrants. Soft, non-tender, non-

distended.


: Deferred.


Rectal: Deferred.


Skin: Warm, dry. Intact.


Extremities: 2+ radial pulses bilaterally. No lower extremity edema.


Neuro: Awake, alert, oriented x3. No gross motor or sensory deficits. Cranial 

nerves II through XII intact. Gait not assessed.


laboratory and microbiology


                                Laboratory Tests


2/26/25 06:20








2/25/25 06:11

















Test


 2/25/25


06:11 Range/Units


 


 


Serum Glucose 96    mg/dL








Assessment/Plan


Impression:


Acute hypoxic respiratory failure


CHF exacerbation


Dyspnea


Morbid obesity BMI 57.9


Elevated troponin





Events:


Remains on supplemental oxygen, 6 LPM NC


Taper O2 as tolerated





ABG shows hypoxia


Arrange for home oxygen





Continue bronchodilators





Therapeutic Lovenox





PT evaluation





Continue diuresis w/ Lasix


Monitor renal function.


Monitor electrolytes. Supplement as necessary.





Disposition per hospitalist.





Labs and imaging reviewed.


Rest of plan as noted below.





Plan:


Supplemental O2


Titrate to keep O2 sats above 92%.





Continue bronchodilators


Antibiotics


Follow up cultures


Blood cultures show no growth after 72 hours in one set; one set notable for GPC

in clusters.


Repeat blood cultures show no growth after 24 hours


Urine cultures show no growth after 48 hours.





F/u Cardiology recs


Elevated troponin


Follow up Echo report





Diurese w/ Lasix


Monitor renal function.


Monitor electrolytes. Supplement as necessary.


Monitor ins and outs.





Diet and lifestyle modifications for weight reduction


Morbid obesity - complicates all care





Recommend outpatient sleep study to r/o COLELEN.





DVT prophylaxis.





Prognosis: Poor given patient's multiple co-morbidities.





Rest of plan per hospitalist and other consultants.





Thank you, [employer] Cancino, for allowing me to participate in this patient's care.


Further recommendations will depend on the patient's clinical course.


Please do not hesitate to contact me if you have any questions or concerns.





This medical document was created using an electronic medical record system with

Dragon computerized dictation system. Although these documentations are being 

carefully reviewed, there may still be some phonetic and typographical changes. 

The errors are purely typographical, due to imperfection on the software 

program, and do not reflect any compromise in the patient's medical care.


Plan discussed with:  Patient, Other (AD Cruz)











MARIANNA BARNETT MD             Feb 26, 2025 23:06

## 2025-04-25 ENCOUNTER — TRANSCRIBE ORDERS (OUTPATIENT)
Dept: ADMINISTRATIVE | Age: 80
End: 2025-04-25

## 2025-04-25 DIAGNOSIS — R79.89 HYPOURICEMIA: Primary | ICD-10-CM

## 2025-04-25 DIAGNOSIS — K80.80 OTHER CHOLELITHIASIS WITHOUT OBSTRUCTION: ICD-10-CM

## 2025-04-29 ENCOUNTER — HOSPITAL ENCOUNTER (OUTPATIENT)
Dept: MRI IMAGING | Age: 80
Discharge: HOME | End: 2025-05-01
Attending: SURGERY
Payer: MEDICARE

## 2025-04-29 DIAGNOSIS — R79.89 HYPOURICEMIA: ICD-10-CM

## 2025-04-29 DIAGNOSIS — K80.80 OTHER CHOLELITHIASIS WITHOUT OBSTRUCTION: ICD-10-CM

## 2025-04-29 PROCEDURE — A9577 INJ MULTIHANCE: HCPCS | Performed by: RADIOLOGY

## 2025-04-29 PROCEDURE — 74183 MRI ABD W/O CNTR FLWD CNTR: CPT

## 2025-04-29 PROCEDURE — 6360000004 HC RX CONTRAST MEDICATION: Performed by: RADIOLOGY

## 2025-04-29 RX ADMIN — GADOBENATE DIMEGLUMINE 14 ML: 529 INJECTION, SOLUTION INTRAVENOUS at 12:44

## 2025-05-19 ENCOUNTER — HOSPITAL ENCOUNTER (OUTPATIENT)
Age: 80
Discharge: HOME | End: 2025-05-21

## 2025-05-19 LAB
AMYLASE SERPL-CCNC: 68 U/L (ref 20–100)
BASOPHILS # BLD: 0.03 K/UL (ref 0–0.2)
BASOPHILS NFR BLD: 1 % (ref 0–2)
EOSINOPHIL # BLD: 0.16 K/UL (ref 0.05–0.5)
EOSINOPHILS RELATIVE PERCENT: 3 % (ref 0–6)
ERYTHROCYTE [DISTWIDTH] IN BLOOD BY AUTOMATED COUNT: 12.4 % (ref 11.5–15)
HCT VFR BLD AUTO: 48 % (ref 37–54)
HGB BLD-MCNC: 15.5 G/DL (ref 12.5–16.5)
IMM GRANULOCYTES # BLD AUTO: <0.03 K/UL (ref 0–0.58)
IMM GRANULOCYTES NFR BLD: 0 % (ref 0–5)
INR PPP: 1.1
LIPASE SERPL-CCNC: 18 U/L (ref 13–60)
LYMPHOCYTES NFR BLD: 1.05 K/UL (ref 1.5–4)
LYMPHOCYTES RELATIVE PERCENT: 22 % (ref 20–42)
MCH RBC QN AUTO: 32.2 PG (ref 26–35)
MCHC RBC AUTO-ENTMCNC: 32.3 G/DL (ref 32–34.5)
MCV RBC AUTO: 99.8 FL (ref 80–99.9)
MONOCYTES NFR BLD: 0.5 K/UL (ref 0.1–0.95)
MONOCYTES NFR BLD: 11 % (ref 2–12)
NEUTROPHILS NFR BLD: 63 % (ref 43–80)
NEUTS SEG NFR BLD: 2.99 K/UL (ref 1.8–7.3)
PLATELET # BLD AUTO: 277 K/UL (ref 130–450)
PMV BLD AUTO: 10.3 FL (ref 7–12)
PROTHROMBIN TIME: 11.3 SEC (ref 9.3–12.4)
RBC # BLD AUTO: 4.81 M/UL (ref 3.8–5.8)
WBC OTHER # BLD: 4.7 K/UL (ref 4.5–11.5)

## 2025-05-19 PROCEDURE — 88112 CYTOPATH CELL ENHANCE TECH: CPT

## 2025-05-19 PROCEDURE — 82150 ASSAY OF AMYLASE: CPT

## 2025-05-19 PROCEDURE — 85025 COMPLETE CBC W/AUTO DIFF WBC: CPT

## 2025-05-19 PROCEDURE — 85610 PROTHROMBIN TIME: CPT

## 2025-05-19 PROCEDURE — 83690 ASSAY OF LIPASE: CPT

## 2025-05-19 PROCEDURE — 88305 TISSUE EXAM BY PATHOLOGIST: CPT

## 2025-05-20 ENCOUNTER — HOSPITAL ENCOUNTER (OUTPATIENT)
Age: 80
Discharge: HOME | End: 2025-05-22

## 2025-05-20 LAB
ALBUMIN SERPL-MCNC: 3.9 G/DL (ref 3.5–5.2)
ALP SERPL-CCNC: 201 U/L (ref 40–129)
ALT SERPL-CCNC: 363 U/L (ref 0–40)
ANION GAP SERPL CALCULATED.3IONS-SCNC: 10 MMOL/L (ref 7–16)
AST SERPL-CCNC: 204 U/L (ref 0–39)
BILIRUB SERPL-MCNC: 3.3 MG/DL (ref 0–1.2)
BUN SERPL-MCNC: 18 MG/DL (ref 6–23)
CALCIUM SERPL-MCNC: 9.3 MG/DL (ref 8.6–10.2)
CHLORIDE SERPL-SCNC: 106 MMOL/L (ref 98–107)
CO2 SERPL-SCNC: 25 MMOL/L (ref 22–29)
CREAT SERPL-MCNC: 0.9 MG/DL (ref 0.7–1.2)
ERYTHROCYTE [DISTWIDTH] IN BLOOD BY AUTOMATED COUNT: 12.5 % (ref 11.5–15)
GFR, ESTIMATED: 84 ML/MIN/1.73M2
GLUCOSE SERPL-MCNC: 125 MG/DL (ref 74–99)
HCT VFR BLD AUTO: 42.2 % (ref 37–54)
HGB BLD-MCNC: 13.5 G/DL (ref 12.5–16.5)
LIPASE SERPL-CCNC: 184 U/L (ref 13–60)
MCH RBC QN AUTO: 32.1 PG (ref 26–35)
MCHC RBC AUTO-ENTMCNC: 32 G/DL (ref 32–34.5)
MCV RBC AUTO: 100.2 FL (ref 80–99.9)
PLATELET # BLD AUTO: 240 K/UL (ref 130–450)
PMV BLD AUTO: 10.8 FL (ref 7–12)
POTASSIUM SERPL-SCNC: 4.6 MMOL/L (ref 3.5–5)
PROT SERPL-MCNC: 6 G/DL (ref 6.4–8.3)
RBC # BLD AUTO: 4.21 M/UL (ref 3.8–5.8)
SODIUM SERPL-SCNC: 141 MMOL/L (ref 132–146)
WBC OTHER # BLD: 6.1 K/UL (ref 4.5–11.5)

## 2025-05-20 PROCEDURE — 85027 COMPLETE CBC AUTOMATED: CPT

## 2025-05-20 PROCEDURE — 80053 COMPREHEN METABOLIC PANEL: CPT

## 2025-05-20 PROCEDURE — 83690 ASSAY OF LIPASE: CPT

## 2025-05-21 ENCOUNTER — HOSPITAL ENCOUNTER (OUTPATIENT)
Age: 80
Discharge: HOME | End: 2025-05-23

## 2025-05-21 LAB
ALBUMIN SERPL-MCNC: 3.7 G/DL (ref 3.5–5.2)
ALP SERPL-CCNC: 223 U/L (ref 40–129)
ALT SERPL-CCNC: 325 U/L (ref 0–40)
ANION GAP SERPL CALCULATED.3IONS-SCNC: 9 MMOL/L (ref 7–16)
AST SERPL-CCNC: 152 U/L (ref 0–39)
BASOPHILS # BLD: 0.05 K/UL (ref 0–0.2)
BASOPHILS NFR BLD: 1 % (ref 0–2)
BILIRUB SERPL-MCNC: 4.3 MG/DL (ref 0–1.2)
BUN SERPL-MCNC: 13 MG/DL (ref 6–23)
CALCIUM SERPL-MCNC: 9.1 MG/DL (ref 8.6–10.2)
CHLORIDE SERPL-SCNC: 105 MMOL/L (ref 98–107)
CO2 SERPL-SCNC: 25 MMOL/L (ref 22–29)
CREAT SERPL-MCNC: 0.8 MG/DL (ref 0.7–1.2)
EOSINOPHIL # BLD: 0.38 K/UL (ref 0.05–0.5)
EOSINOPHILS RELATIVE PERCENT: 6 % (ref 0–6)
ERYTHROCYTE [DISTWIDTH] IN BLOOD BY AUTOMATED COUNT: 12.9 % (ref 11.5–15)
GFR, ESTIMATED: 89 ML/MIN/1.73M2
GLUCOSE SERPL-MCNC: 118 MG/DL (ref 74–99)
HCT VFR BLD AUTO: 40.9 % (ref 37–54)
HGB BLD-MCNC: 13.1 G/DL (ref 12.5–16.5)
IMM GRANULOCYTES # BLD AUTO: <0.03 K/UL (ref 0–0.58)
IMM GRANULOCYTES NFR BLD: 0 % (ref 0–5)
LIPASE SERPL-CCNC: 47 U/L (ref 13–60)
LYMPHOCYTES NFR BLD: 1.17 K/UL (ref 1.5–4)
LYMPHOCYTES RELATIVE PERCENT: 17 % (ref 20–42)
MCH RBC QN AUTO: 32.3 PG (ref 26–35)
MCHC RBC AUTO-ENTMCNC: 32 G/DL (ref 32–34.5)
MCV RBC AUTO: 100.7 FL (ref 80–99.9)
MONOCYTES NFR BLD: 0.79 K/UL (ref 0.1–0.95)
MONOCYTES NFR BLD: 12 % (ref 2–12)
NEUTROPHILS NFR BLD: 65 % (ref 43–80)
NEUTS SEG NFR BLD: 4.44 K/UL (ref 1.8–7.3)
NON-GYN CYTOLOGY REPORT: NORMAL
PLATELET # BLD AUTO: 223 K/UL (ref 130–450)
PMV BLD AUTO: 10.7 FL (ref 7–12)
POTASSIUM SERPL-SCNC: 4.3 MMOL/L (ref 3.5–5)
PROT SERPL-MCNC: 6 G/DL (ref 6.4–8.3)
RBC # BLD AUTO: 4.06 M/UL (ref 3.8–5.8)
SODIUM SERPL-SCNC: 139 MMOL/L (ref 132–146)
WBC OTHER # BLD: 6.9 K/UL (ref 4.5–11.5)

## 2025-05-21 PROCEDURE — 83690 ASSAY OF LIPASE: CPT

## 2025-05-21 PROCEDURE — 88305 TISSUE EXAM BY PATHOLOGIST: CPT

## 2025-05-21 PROCEDURE — 88112 CYTOPATH CELL ENHANCE TECH: CPT

## 2025-05-21 PROCEDURE — 85025 COMPLETE CBC W/AUTO DIFF WBC: CPT

## 2025-05-21 PROCEDURE — 80053 COMPREHEN METABOLIC PANEL: CPT

## 2025-05-22 ENCOUNTER — HOSPITAL ENCOUNTER (OUTPATIENT)
Age: 80
Discharge: HOME | End: 2025-05-24

## 2025-05-22 LAB
ALBUMIN SERPL-MCNC: 4.2 G/DL (ref 3.5–5.2)
ALP SERPL-CCNC: 255 U/L (ref 40–129)
ALT SERPL-CCNC: 307 U/L (ref 0–40)
ANION GAP SERPL CALCULATED.3IONS-SCNC: 12 MMOL/L (ref 7–16)
AST SERPL-CCNC: 118 U/L (ref 0–39)
BILIRUB SERPL-MCNC: 2.1 MG/DL (ref 0–1.2)
BUN SERPL-MCNC: 10 MG/DL (ref 6–23)
CALCIUM SERPL-MCNC: 9.6 MG/DL (ref 8.6–10.2)
CHLORIDE SERPL-SCNC: 102 MMOL/L (ref 98–107)
CO2 SERPL-SCNC: 24 MMOL/L (ref 22–29)
CREAT SERPL-MCNC: 0.8 MG/DL (ref 0.7–1.2)
ERYTHROCYTE [DISTWIDTH] IN BLOOD BY AUTOMATED COUNT: 13.1 % (ref 11.5–15)
GFR, ESTIMATED: 90 ML/MIN/1.73M2
GLUCOSE SERPL-MCNC: 123 MG/DL (ref 74–99)
HCT VFR BLD AUTO: 43.8 % (ref 37–54)
HGB BLD-MCNC: 14.9 G/DL (ref 12.5–16.5)
LIPASE SERPL-CCNC: 66 U/L (ref 13–60)
MCH RBC QN AUTO: 33.1 PG (ref 26–35)
MCHC RBC AUTO-ENTMCNC: 34 G/DL (ref 32–34.5)
MCV RBC AUTO: 97.3 FL (ref 80–99.9)
PLATELET # BLD AUTO: 236 K/UL (ref 130–450)
PMV BLD AUTO: 10.7 FL (ref 7–12)
POTASSIUM SERPL-SCNC: 4 MMOL/L (ref 3.5–5)
PROT SERPL-MCNC: 6.7 G/DL (ref 6.4–8.3)
RBC # BLD AUTO: 4.5 M/UL (ref 3.8–5.8)
SODIUM SERPL-SCNC: 138 MMOL/L (ref 132–146)
WBC OTHER # BLD: 9 K/UL (ref 4.5–11.5)

## 2025-05-22 PROCEDURE — 83690 ASSAY OF LIPASE: CPT

## 2025-05-22 PROCEDURE — 80053 COMPREHEN METABOLIC PANEL: CPT

## 2025-05-22 PROCEDURE — 85027 COMPLETE CBC AUTOMATED: CPT

## 2025-05-24 LAB — NON-GYN CYTOLOGY REPORT: NORMAL

## 2025-06-23 ENCOUNTER — HOSPITAL ENCOUNTER (OUTPATIENT)
Age: 80
Discharge: HOME | End: 2025-06-25

## 2025-06-24 ENCOUNTER — APPOINTMENT (OUTPATIENT)
Dept: CT IMAGING | Age: 80
End: 2025-06-24
Payer: MEDICARE

## 2025-06-24 ENCOUNTER — HOSPITAL ENCOUNTER (EMERGENCY)
Age: 80
Discharge: HOME | End: 2025-06-25
Attending: STUDENT IN AN ORGANIZED HEALTH CARE EDUCATION/TRAINING PROGRAM
Payer: MEDICARE

## 2025-06-24 DIAGNOSIS — R93.89 ABNORMAL CT SCAN: ICD-10-CM

## 2025-06-24 DIAGNOSIS — K59.00 CONSTIPATION, UNSPECIFIED CONSTIPATION TYPE: ICD-10-CM

## 2025-06-24 DIAGNOSIS — G89.18 POST-OPERATIVE PAIN: Primary | ICD-10-CM

## 2025-06-24 LAB
ALBUMIN SERPL-MCNC: 4.2 G/DL (ref 3.5–5.2)
ALP SERPL-CCNC: 127 U/L (ref 40–129)
ALT SERPL-CCNC: 71 U/L (ref 0–50)
ANION GAP SERPL CALCULATED.3IONS-SCNC: 10 MMOL/L (ref 7–16)
AST SERPL-CCNC: 52 U/L (ref 0–50)
BASOPHILS # BLD: 0.03 K/UL (ref 0–0.2)
BASOPHILS NFR BLD: 0 % (ref 0–2)
BILIRUB DIRECT SERPL-MCNC: <0.1 MG/DL (ref 0–0.2)
BILIRUB INDIRECT SERPL-MCNC: ABNORMAL MG/DL (ref 0–1)
BILIRUB SERPL-MCNC: 0.4 MG/DL (ref 0–1.2)
BUN SERPL-MCNC: 17 MG/DL (ref 8–23)
CALCIUM SERPL-MCNC: 9.5 MG/DL (ref 8.8–10.2)
CHLORIDE SERPL-SCNC: 102 MMOL/L (ref 98–107)
CO2 SERPL-SCNC: 27 MMOL/L (ref 22–29)
CREAT SERPL-MCNC: 0.8 MG/DL (ref 0.7–1.2)
EOSINOPHIL # BLD: 0.05 K/UL (ref 0.05–0.5)
EOSINOPHILS RELATIVE PERCENT: 0 % (ref 0–6)
ERYTHROCYTE [DISTWIDTH] IN BLOOD BY AUTOMATED COUNT: 11.9 % (ref 11.5–15)
GFR, ESTIMATED: 89 ML/MIN/1.73M2
GLUCOSE SERPL-MCNC: 107 MG/DL (ref 74–99)
HCT VFR BLD AUTO: 42.8 % (ref 37–54)
HGB BLD-MCNC: 13.9 G/DL (ref 12.5–16.5)
IMM GRANULOCYTES # BLD AUTO: 0.11 K/UL (ref 0–0.58)
IMM GRANULOCYTES NFR BLD: 1 % (ref 0–5)
LACTATE BLDV-SCNC: 1.3 MMOL/L (ref 0.5–2.2)
LIPASE SERPL-CCNC: 12 U/L (ref 13–60)
LYMPHOCYTES NFR BLD: 1.25 K/UL (ref 1.5–4)
LYMPHOCYTES RELATIVE PERCENT: 7 % (ref 20–42)
MCH RBC QN AUTO: 32.3 PG (ref 26–35)
MCHC RBC AUTO-ENTMCNC: 32.5 G/DL (ref 32–34.5)
MCV RBC AUTO: 99.3 FL (ref 80–99.9)
MONOCYTES NFR BLD: 1.03 K/UL (ref 0.1–0.95)
MONOCYTES NFR BLD: 6 % (ref 2–12)
NEUTROPHILS NFR BLD: 87 % (ref 43–80)
NEUTS SEG NFR BLD: 16 K/UL (ref 1.8–7.3)
PLATELET # BLD AUTO: 304 K/UL (ref 130–450)
PMV BLD AUTO: 9.8 FL (ref 7–12)
POTASSIUM SERPL-SCNC: 4.6 MMOL/L (ref 3.5–5.1)
POTASSIUM SERPL-SCNC: 5.8 MMOL/L (ref 3.5–5.1)
PROT SERPL-MCNC: 7.3 G/DL (ref 6.4–8.3)
RBC # BLD AUTO: 4.31 M/UL (ref 3.8–5.8)
SODIUM SERPL-SCNC: 139 MMOL/L (ref 136–145)
TROPONIN I SERPL HS-MCNC: 7 NG/L (ref 0–22)
WBC OTHER # BLD: 18.5 K/UL (ref 4.5–11.5)

## 2025-06-24 PROCEDURE — 6360000002 HC RX W HCPCS: Performed by: STUDENT IN AN ORGANIZED HEALTH CARE EDUCATION/TRAINING PROGRAM

## 2025-06-24 PROCEDURE — 81001 URINALYSIS AUTO W/SCOPE: CPT

## 2025-06-24 PROCEDURE — 6360000004 HC RX CONTRAST MEDICATION: Performed by: RADIOLOGY

## 2025-06-24 PROCEDURE — 83690 ASSAY OF LIPASE: CPT

## 2025-06-24 PROCEDURE — 96372 THER/PROPH/DIAG INJ SC/IM: CPT

## 2025-06-24 PROCEDURE — 84132 ASSAY OF SERUM POTASSIUM: CPT

## 2025-06-24 PROCEDURE — 84484 ASSAY OF TROPONIN QUANT: CPT

## 2025-06-24 PROCEDURE — 99285 EMERGENCY DEPT VISIT HI MDM: CPT

## 2025-06-24 PROCEDURE — 74177 CT ABD & PELVIS W/CONTRAST: CPT

## 2025-06-24 PROCEDURE — 83605 ASSAY OF LACTIC ACID: CPT

## 2025-06-24 PROCEDURE — 82248 BILIRUBIN DIRECT: CPT

## 2025-06-24 PROCEDURE — 85025 COMPLETE CBC W/AUTO DIFF WBC: CPT

## 2025-06-24 PROCEDURE — 80053 COMPREHEN METABOLIC PANEL: CPT

## 2025-06-24 PROCEDURE — 93005 ELECTROCARDIOGRAM TRACING: CPT | Performed by: STUDENT IN AN ORGANIZED HEALTH CARE EDUCATION/TRAINING PROGRAM

## 2025-06-24 PROCEDURE — 2580000003 HC RX 258

## 2025-06-24 RX ORDER — IOPAMIDOL 755 MG/ML
75 INJECTION, SOLUTION INTRAVASCULAR
Status: COMPLETED | OUTPATIENT
Start: 2025-06-24 | End: 2025-06-24

## 2025-06-24 RX ORDER — 0.9 % SODIUM CHLORIDE 0.9 %
1000 INTRAVENOUS SOLUTION INTRAVENOUS ONCE
Status: COMPLETED | OUTPATIENT
Start: 2025-06-24 | End: 2025-06-24

## 2025-06-24 RX ADMIN — SODIUM CHLORIDE 1000 ML: 0.9 INJECTION, SOLUTION INTRAVENOUS at 21:45

## 2025-06-24 RX ADMIN — METHYLNALTREXONE BROMIDE 12 MG: 12 INJECTION, SOLUTION SUBCUTANEOUS at 21:58

## 2025-06-24 RX ADMIN — IOPAMIDOL 75 ML: 755 INJECTION, SOLUTION INTRAVENOUS at 23:34

## 2025-06-24 ASSESSMENT — PAIN DESCRIPTION - DESCRIPTORS: DESCRIPTORS: CRUSHING;DISCOMFORT

## 2025-06-24 ASSESSMENT — PAIN DESCRIPTION - FREQUENCY: FREQUENCY: INTERMITTENT

## 2025-06-24 ASSESSMENT — PAIN DESCRIPTION - LOCATION
LOCATION: ABDOMEN
LOCATION: ABDOMEN

## 2025-06-24 ASSESSMENT — LIFESTYLE VARIABLES
HOW OFTEN DO YOU HAVE A DRINK CONTAINING ALCOHOL: NEVER
HOW MANY STANDARD DRINKS CONTAINING ALCOHOL DO YOU HAVE ON A TYPICAL DAY: PATIENT DOES NOT DRINK

## 2025-06-24 ASSESSMENT — PAIN SCALES - GENERAL
PAINLEVEL_OUTOF10: 10
PAINLEVEL_OUTOF10: 10

## 2025-06-24 ASSESSMENT — PAIN - FUNCTIONAL ASSESSMENT: PAIN_FUNCTIONAL_ASSESSMENT: 0-10

## 2025-06-24 ASSESSMENT — PAIN DESCRIPTION - ONSET: ONSET: ON-GOING

## 2025-06-24 ASSESSMENT — PAIN DESCRIPTION - ORIENTATION: ORIENTATION: RIGHT

## 2025-06-24 ASSESSMENT — PAIN DESCRIPTION - PAIN TYPE: TYPE: SURGICAL PAIN

## 2025-06-25 VITALS
HEIGHT: 64 IN | SYSTOLIC BLOOD PRESSURE: 115 MMHG | DIASTOLIC BLOOD PRESSURE: 65 MMHG | RESPIRATION RATE: 15 BRPM | HEART RATE: 70 BPM | OXYGEN SATURATION: 93 % | TEMPERATURE: 97.7 F | BODY MASS INDEX: 25.44 KG/M2 | WEIGHT: 149 LBS

## 2025-06-25 LAB
BILIRUB UR QL STRIP: NEGATIVE
CLARITY UR: CLEAR
COLOR UR: YELLOW
EKG ATRIAL RATE: 74 BPM
EKG P AXIS: 36 DEGREES
EKG P-R INTERVAL: 140 MS
EKG Q-T INTERVAL: 370 MS
EKG QRS DURATION: 78 MS
EKG QTC CALCULATION (BAZETT): 410 MS
EKG R AXIS: 29 DEGREES
EKG T AXIS: 40 DEGREES
EKG VENTRICULAR RATE: 74 BPM
GLUCOSE UR STRIP-MCNC: NEGATIVE MG/DL
HGB UR QL STRIP.AUTO: NEGATIVE
KETONES UR STRIP-MCNC: NEGATIVE MG/DL
LEUKOCYTE ESTERASE UR QL STRIP: NEGATIVE
NITRITE UR QL STRIP: NEGATIVE
PH UR STRIP: 7 [PH] (ref 5–8)
POTASSIUM SERPL-SCNC: 4.5 MMOL/L (ref 3.5–5.1)
PROT UR STRIP-MCNC: NEGATIVE MG/DL
RBC #/AREA URNS HPF: NORMAL /HPF
SP GR UR STRIP: 1.01 (ref 1–1.03)
UROBILINOGEN UR STRIP-ACNC: 0.2 EU/DL (ref 0–1)
WBC #/AREA URNS HPF: NORMAL /HPF

## 2025-06-25 PROCEDURE — 6360000002 HC RX W HCPCS: Performed by: STUDENT IN AN ORGANIZED HEALTH CARE EDUCATION/TRAINING PROGRAM

## 2025-06-25 PROCEDURE — 84132 ASSAY OF SERUM POTASSIUM: CPT

## 2025-06-25 PROCEDURE — 93010 ELECTROCARDIOGRAM REPORT: CPT | Performed by: INTERNAL MEDICINE

## 2025-06-25 PROCEDURE — 96374 THER/PROPH/DIAG INJ IV PUSH: CPT

## 2025-06-25 RX ORDER — POLYETHYLENE GLYCOL 3350 17 G/17G
17 POWDER, FOR SOLUTION ORAL DAILY PRN
Qty: 30 PACKET | Refills: 0 | Status: SHIPPED | OUTPATIENT
Start: 2025-06-25 | End: 2025-07-25

## 2025-06-25 RX ORDER — FENTANYL CITRATE 50 UG/ML
50 INJECTION, SOLUTION INTRAMUSCULAR; INTRAVENOUS ONCE
Status: COMPLETED | OUTPATIENT
Start: 2025-06-25 | End: 2025-06-25

## 2025-06-25 RX ORDER — DOCUSATE SODIUM 100 MG/1
100 CAPSULE, LIQUID FILLED ORAL 2 TIMES DAILY
Qty: 14 CAPSULE | Refills: 0 | Status: SHIPPED | OUTPATIENT
Start: 2025-06-25 | End: 2025-07-02

## 2025-06-25 RX ORDER — DOCUSATE SODIUM 100 MG/1
100 CAPSULE, LIQUID FILLED ORAL 2 TIMES DAILY
Qty: 60 CAPSULE | Refills: 0 | Status: SHIPPED | OUTPATIENT
Start: 2025-06-25 | End: 2025-07-25

## 2025-06-25 RX ORDER — POLYETHYLENE GLYCOL 3350 17 G/17G
17 POWDER, FOR SOLUTION ORAL DAILY PRN
Qty: 510 G | Refills: 0 | Status: SHIPPED | OUTPATIENT
Start: 2025-06-25 | End: 2025-07-25

## 2025-06-25 RX ADMIN — FENTANYL CITRATE 50 MCG: 50 INJECTION INTRAMUSCULAR; INTRAVENOUS at 01:35

## 2025-06-25 ASSESSMENT — PAIN DESCRIPTION - LOCATION: LOCATION: ABDOMEN

## 2025-06-25 ASSESSMENT — PAIN DESCRIPTION - ORIENTATION: ORIENTATION: RIGHT

## 2025-06-25 ASSESSMENT — PAIN SCALES - GENERAL: PAINLEVEL_OUTOF10: 8

## 2025-06-25 NOTE — DISCHARGE INSTRUCTIONS
Follow-up with general surgery as an outpatient.  Continue bowel regimen at home.    CT ABDOMEN PELVIS W IV CONTRAST Additional Contrast? None (Final result)  Result time 06/25/25 00:18:53  Final result by Barby Griffiths MD (06/25/25 00:18:53)                Impression:    Moderate central intrahepatic biliary dilatation with common bile duct stent  in place.  Trace fluid is seen in the gallbladder fossa.  Pancreatic duct  stent centrally.    There is narrowing of the main portal vein at the bifurcation with poor  enhancement of the left portal vein, concerning for at least partial  thrombosis.  This may be further evaluated with liver duplex.    Large amount of stool within the cecum, ascending colon and transverse colon.  Large amount of formed stool within the rectum.    Scattered pneumoperitoneum, compatible with recent surgery.            Narrative:

## 2025-06-25 NOTE — ED PROVIDER NOTES
[facility] EMERGENCY DEPARTMENT  EMERGENCY DEPARTMENT ENCOUNTER        Pt Name: Marbin Carroll  MRN: [id number]  Birthdate 1945  Date of evaluation: 6/24/2025  Provider: Rich Butler MD  PCP: Carmina Hi MD  Note Started: 8:32 PM EDT 6/24/25    CHIEF COMPLAINT       Chief Complaint   Patient presents with    Post-op Problem     Gall bladder surgery yesterday + pain and constipation, last BM 4 days     Chills       HISTORY OF PRESENT ILLNESS: 1 or more Elements   History From: Patient    Limitations to history : None  Social Determinants : None    Marbin Carroll is a 80 y.o. male who presents to the emergency department for evaluation constipation.  Patient is status post cholecystectomy done at [facility] on 6/23/2025 by Dr. Jimenes.  Patient states that he has been unable to have a bowel movement for the last 4 days.  Patient states that he has had worsening symptoms since he had surgery yesterday.  Patient has tried MiraLAX, stool softeners but nothing seems to be working.  Patient states that he has been eating without any problems.  Patient states that nothing seems to be working for him.  He states that he has a little bit of abdominal tenderness which is likely due to his recent operation.  He denies any fever but endorses chills.  Denies any chest pain, cough, dysuria, hematuria, diarrhea, bloody stools.  Patient stated that he is able to pass gas.    Nursing Notes were all reviewed and agreed with or any disagreements were addressed in the HPI.    ROS:   Pertinent positives and negatives are stated within HPI, all other systems reviewed and are negative.      --------------------------------------------- PAST HISTORY ---------------------------------------------  Past Medical History:  has a past medical history of Arthritis, Benign prostatic hyperplasia, Diverticulosis, Erectile dysfunction, GERD (gastroesophageal reflux disease), Heart murmur,

## 2025-06-26 RX ORDER — AMLODIPINE BESYLATE 5 MG/1
5 TABLET ORAL DAILY
Status: ON HOLD | COMMUNITY

## 2025-06-26 NOTE — PROGRESS NOTES
[facility] PRE-ADMISSION TESTING   ENDOSCOPY INSTRUCTIONS  PAT- Phone Number: [phone]    ENDOSCOPY INSTRUCTIONS:     [x] Antibacterial Soap Shower Night before AND morning of procedure.  [x] Do not wear lotions, powders, deodorant.   [x] No solid food after midnight. You may have SIPS of clear liquids up until 2 hours before your arrival time to the hospital.   [x] You may brush your teeth, gargle, but do NOT swallow water.   [x] No tobacco products, illegal drugs, or alcohol within 24 hours of your surgery.  [x] Jewelry or valuables should not be brought to the hospital. All body and/or tongue piercing's must be removed prior to arriving to hospital. No contact lens or hair pins.   [] Urine Pregnancy test will be preformed the day of surgery. A specimen sample may be brought from home.  [x] Arrange transportation with a responsible adult  to and from the hospital. If you do not have a responsible adult  to transport you, you will need to make arrangements with a medical transportation company. Arrange for someone to be with you for the remainder of the day and for 24 hours after your procedure due to having had anesthesia.    -Who will be your  for transportation? wife  -Who will be staying with you for 24 hrs after your procedure? Wife   [x] Bring insurance card and photo ID.  [x] Bring copy of living will or healthcare power of  papers to be placed in your electronic record.    PARKING INSTRUCTIONS:     [x] ARRIVAL DATE & TIME: 7/2  @  0730  [x] Times are subject to change. We will contact you the business day before surgery if that were to occur.  [x] Enter into the [facility] Entrance. Two people may accompany you. Masks are not required.  [x] Parking Lot \"I\" is where you will park. It is located on the corner of [address]. The entrance is on [address].   Only one vehicle - per patient, is permitted in parking

## 2025-06-30 LAB — SURGICAL PATHOLOGY REPORT: NORMAL

## 2025-07-02 ENCOUNTER — APPOINTMENT (OUTPATIENT)
Dept: GENERAL RADIOLOGY | Age: 80
End: 2025-07-02
Attending: INTERNAL MEDICINE
Payer: MEDICARE

## 2025-07-02 ENCOUNTER — HOSPITAL ENCOUNTER (OUTPATIENT)
Age: 80
Setting detail: OUTPATIENT SURGERY
Discharge: HOME | End: 2025-07-02
Attending: INTERNAL MEDICINE | Admitting: INTERNAL MEDICINE
Payer: MEDICARE

## 2025-07-02 ENCOUNTER — ANESTHESIA EVENT (OUTPATIENT)
Dept: ENDOSCOPY | Age: 80
End: 2025-07-02
Payer: MEDICARE

## 2025-07-02 ENCOUNTER — ANESTHESIA (OUTPATIENT)
Dept: ENDOSCOPY | Age: 80
End: 2025-07-02
Payer: MEDICARE

## 2025-07-02 VITALS
HEART RATE: 69 BPM | HEIGHT: 64 IN | WEIGHT: 145 LBS | DIASTOLIC BLOOD PRESSURE: 59 MMHG | TEMPERATURE: 97.1 F | SYSTOLIC BLOOD PRESSURE: 116 MMHG | BODY MASS INDEX: 24.75 KG/M2 | RESPIRATION RATE: 16 BRPM | OXYGEN SATURATION: 95 %

## 2025-07-02 DIAGNOSIS — K80.40 CALCULUS OF BILE DUCT WITH CHOLECYSTITIS WITHOUT OBSTRUCTION, UNSPECIFIED CHOLECYSTITIS ACUITY: ICD-10-CM

## 2025-07-02 PROCEDURE — 7100000000 HC PACU RECOVERY - FIRST 15 MIN: Performed by: INTERNAL MEDICINE

## 2025-07-02 PROCEDURE — 3609015200 HC ERCP REMOVE CALCULI/DEBRIS BILIARY/PANCREAS DUCT: Performed by: INTERNAL MEDICINE

## 2025-07-02 PROCEDURE — 2500000003 HC RX 250 WO HCPCS

## 2025-07-02 PROCEDURE — C1769 GUIDE WIRE: HCPCS | Performed by: INTERNAL MEDICINE

## 2025-07-02 PROCEDURE — 3609014200 HC ERCP W/BIOPSY SINGLE/MULTIPLE: Performed by: INTERNAL MEDICINE

## 2025-07-02 PROCEDURE — 3700000000 HC ANESTHESIA ATTENDED CARE: Performed by: INTERNAL MEDICINE

## 2025-07-02 PROCEDURE — 88112 CYTOPATH CELL ENHANCE TECH: CPT

## 2025-07-02 PROCEDURE — C2625 STENT, NON-COR, TEM W/DEL SY: HCPCS | Performed by: INTERNAL MEDICINE

## 2025-07-02 PROCEDURE — 6360000002 HC RX W HCPCS: Performed by: INTERNAL MEDICINE

## 2025-07-02 PROCEDURE — 7100000011 HC PHASE II RECOVERY - ADDTL 15 MIN: Performed by: INTERNAL MEDICINE

## 2025-07-02 PROCEDURE — 2720000010 HC SURG SUPPLY STERILE: Performed by: INTERNAL MEDICINE

## 2025-07-02 PROCEDURE — 3700000001 HC ADD 15 MINUTES (ANESTHESIA): Performed by: INTERNAL MEDICINE

## 2025-07-02 PROCEDURE — 2709999900 HC NON-CHARGEABLE SUPPLY: Performed by: INTERNAL MEDICINE

## 2025-07-02 PROCEDURE — 88305 TISSUE EXAM BY PATHOLOGIST: CPT

## 2025-07-02 PROCEDURE — 6360000002 HC RX W HCPCS

## 2025-07-02 PROCEDURE — 7100000001 HC PACU RECOVERY - ADDTL 15 MIN: Performed by: INTERNAL MEDICINE

## 2025-07-02 PROCEDURE — 2580000003 HC RX 258: Performed by: INTERNAL MEDICINE

## 2025-07-02 PROCEDURE — 7100000010 HC PHASE II RECOVERY - FIRST 15 MIN: Performed by: INTERNAL MEDICINE

## 2025-07-02 PROCEDURE — 3609015100 HC ERCP STENT PLACEMENT BILIARY/PANCREATIC DUCT: Performed by: INTERNAL MEDICINE

## 2025-07-02 PROCEDURE — 3609015000 HC ERCP REMOVE FOREIGN BODY/STENT BILIARY/PANC DUCT: Performed by: INTERNAL MEDICINE

## 2025-07-02 PROCEDURE — 3609018800 HC ERCP DX COLLECTION SPECIMEN BRUSHING/WASHING: Performed by: INTERNAL MEDICINE

## 2025-07-02 PROCEDURE — 6360000004 HC RX CONTRAST MEDICATION: Performed by: INTERNAL MEDICINE

## 2025-07-02 DEVICE — BILIARY STENT WITH RX DELIVERY SYSTEM
Type: IMPLANTABLE DEVICE | Status: FUNCTIONAL
Brand: RX BILIARY

## 2025-07-02 RX ORDER — FENTANYL CITRATE 50 UG/ML
INJECTION, SOLUTION INTRAMUSCULAR; INTRAVENOUS
Status: DISCONTINUED | OUTPATIENT
Start: 2025-07-02 | End: 2025-07-02 | Stop reason: SDUPTHER

## 2025-07-02 RX ORDER — PROPOFOL 10 MG/ML
INJECTION, EMULSION INTRAVENOUS
Status: DISCONTINUED | OUTPATIENT
Start: 2025-07-02 | End: 2025-07-02 | Stop reason: SDUPTHER

## 2025-07-02 RX ORDER — ONDANSETRON 2 MG/ML
INJECTION INTRAMUSCULAR; INTRAVENOUS
Status: DISCONTINUED | OUTPATIENT
Start: 2025-07-02 | End: 2025-07-02 | Stop reason: SDUPTHER

## 2025-07-02 RX ORDER — IOPAMIDOL 612 MG/ML
INJECTION, SOLUTION INTRAVASCULAR PRN
Status: DISCONTINUED | OUTPATIENT
Start: 2025-07-02 | End: 2025-07-02 | Stop reason: ALTCHOICE

## 2025-07-02 RX ORDER — SODIUM CHLORIDE 0.9 % (FLUSH) 0.9 %
5-40 SYRINGE (ML) INJECTION EVERY 12 HOURS SCHEDULED
Status: DISCONTINUED | OUTPATIENT
Start: 2025-07-02 | End: 2025-07-02 | Stop reason: HOSPADM

## 2025-07-02 RX ORDER — SODIUM CHLORIDE 9 MG/ML
25 INJECTION, SOLUTION INTRAVENOUS PRN
Status: DISCONTINUED | OUTPATIENT
Start: 2025-07-02 | End: 2025-07-02 | Stop reason: HOSPADM

## 2025-07-02 RX ORDER — ROCURONIUM BROMIDE 10 MG/ML
INJECTION, SOLUTION INTRAVENOUS
Status: DISCONTINUED | OUTPATIENT
Start: 2025-07-02 | End: 2025-07-02 | Stop reason: SDUPTHER

## 2025-07-02 RX ORDER — LIDOCAINE HYDROCHLORIDE 20 MG/ML
INJECTION, SOLUTION INTRAVENOUS
Status: DISCONTINUED | OUTPATIENT
Start: 2025-07-02 | End: 2025-07-02 | Stop reason: SDUPTHER

## 2025-07-02 RX ORDER — DEXAMETHASONE SODIUM PHOSPHATE 10 MG/ML
INJECTION, SOLUTION INTRA-ARTICULAR; INTRALESIONAL; INTRAMUSCULAR; INTRAVENOUS; SOFT TISSUE
Status: DISCONTINUED | OUTPATIENT
Start: 2025-07-02 | End: 2025-07-02 | Stop reason: SDUPTHER

## 2025-07-02 RX ORDER — SODIUM CHLORIDE 0.9 % (FLUSH) 0.9 %
5-40 SYRINGE (ML) INJECTION PRN
Status: DISCONTINUED | OUTPATIENT
Start: 2025-07-02 | End: 2025-07-02 | Stop reason: HOSPADM

## 2025-07-02 RX ADMIN — PROPOFOL 100 MG: 10 INJECTION, EMULSION INTRAVENOUS at 08:57

## 2025-07-02 RX ADMIN — FENTANYL CITRATE 50 MCG: 50 INJECTION, SOLUTION INTRAMUSCULAR; INTRAVENOUS at 09:22

## 2025-07-02 RX ADMIN — ROCURONIUM BROMIDE 30 MG: 10 INJECTION, SOLUTION INTRAVENOUS at 08:57

## 2025-07-02 RX ADMIN — LIDOCAINE HYDROCHLORIDE 60 MG: 20 INJECTION, SOLUTION INTRAVENOUS at 08:57

## 2025-07-02 RX ADMIN — SODIUM CHLORIDE: 9 INJECTION, SOLUTION INTRAVENOUS at 08:44

## 2025-07-02 RX ADMIN — DEXAMETHASONE SODIUM PHOSPHATE 10 MG: 10 INJECTION INTRAMUSCULAR; INTRAVENOUS at 09:00

## 2025-07-02 RX ADMIN — FENTANYL CITRATE 50 MCG: 50 INJECTION, SOLUTION INTRAMUSCULAR; INTRAVENOUS at 08:57

## 2025-07-02 RX ADMIN — VANCOMYCIN HYDROCHLORIDE 1000 MG: 1 INJECTION, POWDER, LYOPHILIZED, FOR SOLUTION INTRAVENOUS at 11:02

## 2025-07-02 RX ADMIN — ROCURONIUM BROMIDE 10 MG: 10 INJECTION, SOLUTION INTRAVENOUS at 09:55

## 2025-07-02 RX ADMIN — ONDANSETRON 4 MG: 2 INJECTION, SOLUTION INTRAMUSCULAR; INTRAVENOUS at 09:16

## 2025-07-02 RX ADMIN — SUGAMMADEX 200 MG: 100 INJECTION, SOLUTION INTRAVENOUS at 10:15

## 2025-07-02 ASSESSMENT — PAIN SCALES - GENERAL
PAINLEVEL_OUTOF10: 0

## 2025-07-02 ASSESSMENT — PAIN - FUNCTIONAL ASSESSMENT
PAIN_FUNCTIONAL_ASSESSMENT: 0-10
PAIN_FUNCTIONAL_ASSESSMENT: 0-10

## 2025-07-02 NOTE — ANESTHESIA POSTPROCEDURE EVALUATION
Department of Anesthesiology  Postprocedure Note    Patient: Marbin Carroll  MRN: [id number]  YOB: 1945  Date of evaluation: 7/2/2025    Procedure Summary       Date: 07/02/25 Room / Location: [place] [address] / [facility]    Anesthesia Start: 0844 Anesthesia Stop: 1026    Procedures:       ENDOSCOPIC RETROGRADE CHOLANGIOPANCREATOGRAPHY WITH SPY GLASS      ENDOSCOPIC RETROGRADE CHOLANGIOPANCREATOGRAPHY STENT REMOVAL      ENDOSCOPIC RETROGRADE CHOLANGIOPANCREATOGRAPHY STENT INSERTION      ENDOSCOPIC RETROGRADE CHOLANGIOPANCREATOGRAPHY BIOPSY      ENDOSCOPIC RETROGRADE CHOLANGIOPANCREATOGRAPHY STONE REMOVAL Diagnosis:       Calculus of bile duct with cholecystitis without obstruction, unspecified cholecystitis acuity      (Calculus of bile duct with cholecystitis without obstruction, unspecified cholecystitis acuity [K80.40])    Surgeons: Yared Garnett DO Responsible Provider: Kenrick Montana DO    Anesthesia Type: MAC ASA Status: 4            Anesthesia Type: No value filed.    Larry Phase I: Larry Score: 9    Larry Phase II:      Anesthesia Post Evaluation    Patient location during evaluation: PACU  Patient participation: complete - patient participated  Level of consciousness: awake and alert  Airway patency: patent  Nausea & Vomiting: no nausea and no vomiting  Cardiovascular status: blood pressure returned to baseline  Respiratory status: acceptable  Hydration status: euvolemic  Multimodal analgesia pain management approach  Pain management: adequate    No notable events documented.

## 2025-07-02 NOTE — PROGRESS NOTES
Spoke with Dr lopez to see when patient can resume taking aspirin. Dr lopez said patient is okay to resume aspirin today.

## 2025-07-02 NOTE — ANESTHESIA PRE PROCEDURE
Department of Anesthesiology  Preprocedure Note       Name:  Marbin Carroll   Age:  80 y.o.  :  1945                                          MRN:  [id number]         Date:  2025      Surgeon: Surgeon(s):  Yared Garnett DO    Procedure: Procedure(s):  ENDOSCOPIC RETROGRADE CHOLANGIOPANCREATOGRAPHY WITH SPY GLASS    Medications prior to admission:   Prior to Admission medications    Medication Sig Start Date End Date Taking? Authorizing Provider   amLODIPine (NORVASC) 5 MG tablet Take 1 tablet by mouth daily   Yes Provider, MD Nico   magnesium oxide (MAG-OX) 400 MG tablet Take 1 tablet by mouth daily   Yes Provider, Historical, MD   Apple Cider Vinegar 500 MG TABS Take by mouth daily   Yes Provider, MD Nico   Potassium 99 MG TABS Take by mouth at bedtime   Yes Provider, MD Nico   Omega-3 Fatty Acids (FISH OIL) 1000 MG CAPS Take 1 capsule by mouth daily Ld 2019   Yes ProviderNico MD   b complex vitamins capsule Take 1 capsule by mouth daily Ld 2019   Yes Provider, MD Nico   docusate sodium (COLACE) 100 MG capsule Take 1 capsule by mouth 2 times daily for 7 days  Patient not taking: Reported on 2025  Teresa Ernst DO   polyethylene glycol (MIRALAX) 17 g packet Take 1 packet by mouth daily as needed for Constipation 25  Teresa Ernst DO   polyethylene glycol (GLYCOLAX) 17 GM/SCOOP powder Take 17 g by mouth daily as needed (Take as needed for constipation) 25  Rich Sales MD   magnesium hydroxide (MILK OF MAGNESIA) 400 MG/5ML suspension Take 30 mLs by mouth daily as needed for Constipation 25   Rich Sales MD   docusate sodium (COLACE) 100 MG capsule Take 1 capsule by mouth 2 times daily 25  Rich Sales MD   pravastatin (PRAVACHOL) 40 MG tablet Take 1 tablet by mouth nightly at bedtime  Patient not taking: Reported on

## 2025-07-02 NOTE — H&P
Advanced Endoscopy/Gastroenterology Outpatient Procedure H&P Note  Yared Garnett D.O.    Date:6:04 AM 7/2/2025    Marbin Carroll  80 y.o.  male    HPI:  Patient is here for an ERCP with biliary stent removal and likely spyglass.  ERCP was performed at [facility] demonstrating a CHD stricture again long biliary stent was placed.  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 Jimenes.    PAST MEDICAL Hx:  Past Medical History:   Diagnosis Date    Arthritis     Benign prostatic hyperplasia     Diverticulosis     Erectile dysfunction     Impotence of organic origin    GERD (gastroesophageal reflux disease)     Heart murmur     History of prostate cancer     prostate- disorder of prostate    History of stroke 2003    l eye    Hypercholesterolemia     Hyperlipidemia     MVP (mitral valve prolapse)        PAST SURGICAL Hx:   Past Surgical History:   Procedure Laterality Date    ARTHRODESIS Left 1/11/2019    LEFT FOOT ARTHRODESIS 1ST METATARSOPHALANGEAL JOINT performed by Griffin Leary DPM at [facility] OR    BLADDER SUSPENSION      BREAST SURGERY  2011    l lump neg    COLECTOMY Left 10/10/2022    LAPAROSCOPIC ROBOTIC XI ASSISTED LEFT COLON RESECTION POSSIBLE OPEN performed by Lui Jimenes MD at [facility] OR    COLONOSCOPY  2012 4/18/2016,9/19/2007,2/14/2011    CYSTOSCOPY Bilateral 10/10/2022    CYSTOSCOPY EXTERNAL URETERAL STENT INSERTION performed by Lui Jimenes MD at [facility] OR    HERNIA REPAIR  1962    r inguinal hydrocele also    KNEE ARTHROSCOPY Left     LYMPH NODE BIOPSY  1965    neg    PROSTATECTOMY  [id number]    LAP ROBOTIC    UPPER GASTROINTESTINAL ENDOSCOPY  2016       FAMILY Hx:  Family History   Problem Relation Age of Onset    Cancer Mother         breast    Dementia Father     Heart Disease Father        HOME MEDICATIONS:  Prior to Admission medications    Medication Sig Start Date End Date  Taking? Authorizing Provider   amLODIPine (NORVASC) 5 MG tablet Take 1 tablet by mouth daily    Nico Lozada MD   docusate sodium (COLACE) 100 MG capsule Take 1 capsule by mouth 2 times daily for 7 days 6/25/25 7/2/25  Teresa Ernst DO   polyethylene glycol (MIRALAX) 17 g packet Take 1 packet by mouth daily as needed for Constipation 6/25/25 7/25/25  Teresa Ernst DO   polyethylene glycol (GLYCOLAX) 17 GM/SCOOP powder Take 17 g by mouth daily as needed (Take as needed for constipation) 6/25/25 7/25/25  Rich Sales MD   magnesium hydroxide (MILK OF MAGNESIA) 400 MG/5ML suspension Take 30 mLs by mouth daily as needed for Constipation 6/25/25   Rich Sales MD   docusate sodium (COLACE) 100 MG capsule Take 1 capsule by mouth 2 times daily 6/25/25 7/25/25  Rich Sales MD   pravastatin (PRAVACHOL) 40 MG tablet Take 1 tablet by mouth nightly at bedtime  Patient not taking: Reported on 6/26/2025 12/4/23   Nico Lozada MD   EPINEPHrine (EPIPEN 2-ALTA) 0.3 MG/0.3ML SOAJ injection Inject 0.3 mLs into the muscle once for 1 dose Use as directed for allergic reaction 9/2/20 12/20/23  Delbert Guerrier DO   magnesium oxide (MAG-OX) 400 MG tablet Take 1 tablet by mouth daily  Patient not taking: Reported on 6/26/2025    Nico Lozada MD   Apple Cider Vinegar 500 MG TABS Take by mouth daily  Patient not taking: Reported on 6/26/2025    Nico Lozada MD   Potassium 99 MG TABS Take by mouth at bedtime  Patient not taking: Reported on 6/26/2025    Nico Lozada MD   aspirin 81 MG tablet Take 1 tablet by mouth daily    Nico Lozada MD   Omega-3 Fatty Acids (FISH OIL) 1000 MG CAPS Take 1 capsule by mouth daily Ld 1/6/2019  Patient not taking: Reported on 6/26/2025    Nico Lozada MD   b complex vitamins capsule Take 1 capsule by mouth daily Ld 1/6/2019  Patient not taking: Reported on 6/26/2025

## 2025-07-02 NOTE — DISCHARGE INSTRUCTIONS
Resume all home medications  Resume all anticoagulants today   Follow up in 3-4 weeks   Call office [phone]  Resume regular diet

## 2025-07-02 NOTE — PROGRESS NOTES
Dr garnett came to bedside and gave discharge instructions to patient. Dr Garnett said patient is okay to discharge home once IV vancomycin is done infusing.

## 2025-07-02 NOTE — PROCEDURES
PROCEDURE PERFORMED:  ERCP with any interventions as indicated     PREOPERATIVE DIAGNOSES:  Biliary stricture, hx of obstructive jaundice,      POSTOPERATIVE DIAGNOSES:  biliary stricture     ANESTHESIA:  General      DESCRIPTION OF PROCEDURE:  With the patient in supine position, the Olympus side-viewing video duodenoscope was introduced into the esophagus, advanced through the GE junction into the gastric body, advanced through the pylorus into duodenal bulb, second portion of duodenum, the ampulla was visualized w/ stents protuding from papilla. The stents were removed and guide wire and extraction balloon utilized to cannulate the biliary system.  Cholangiogram was performed demonstrating a hilar/common hepatic duct area of stricture, area was approximately 1.5 to 2 cm in length.  Over the top of the guidewire balloon sweeps were performed removing biliary sludge/stent debris.  Decision was made to perform cholangioscopy for sampling of the strictured area.  Spyglass was advanced to the area of the stricture and spy bite was performed.  Area was mildly nodular with some increased vascularity, biopsies did not appear to be friable but more fibrotic.  Cholangioscope was able to pass the strictured area with minimal difficulty.  Proximal dilation was noted.  Aspiration of contents injected from the cholangioscope was performed.  Cholangioscope was withdrawn and 7 Chinese by 12 cm duodenal band plastic stent was placed across the stricture to maintain continued drainage. Good flow of bile and contrast was observed post stent placement. Pictures were taken.  The scope was retrieved, area decompressed, and the scope was withdrawn. The patient tolerated the procedure well.  Note that once decision was made to perform cholangioscopy antibiotics were ordered.    Blood loss was minimal and self limited      Assessment:  1) biliary stent//sludge debris/pancreatic stent removal and replacement (7 Chinese by 12 cm duodenal

## 2025-07-02 NOTE — PROGRESS NOTES
Contacted Dr garnett about discharge instructions. Dr Garnett stated that he's working on putting them in.

## 2025-07-03 ENCOUNTER — HOSPITAL ENCOUNTER (INPATIENT)
Age: 80
LOS: 4 days | Discharge: HOME HEALTH | DRG: 919 | End: 2025-07-07
Attending: EMERGENCY MEDICINE | Admitting: INTERNAL MEDICINE
Payer: MEDICARE

## 2025-07-03 DIAGNOSIS — A41.9 SEPSIS, DUE TO UNSPECIFIED ORGANISM, UNSPECIFIED WHETHER ACUTE ORGAN DYSFUNCTION PRESENT (HCC): ICD-10-CM

## 2025-07-03 DIAGNOSIS — K83.09 ASCENDING CHOLANGITIS (HCC): Primary | ICD-10-CM

## 2025-07-03 DIAGNOSIS — R78.81 BACTEREMIA DUE TO ENTEROBACTER SPECIES: ICD-10-CM

## 2025-07-03 DIAGNOSIS — B96.89 BACTEREMIA DUE TO ENTEROBACTER SPECIES: ICD-10-CM

## 2025-07-03 PROBLEM — R65.21 SEPTIC SHOCK (HCC): Status: ACTIVE | Noted: 2025-07-03

## 2025-07-03 LAB
ALBUMIN SERPL-MCNC: 3.6 G/DL (ref 3.5–5.2)
ALP SERPL-CCNC: 246 U/L (ref 40–129)
ALT SERPL-CCNC: 340 U/L (ref 0–50)
ANION GAP SERPL CALCULATED.3IONS-SCNC: 13 MMOL/L (ref 7–16)
AST SERPL-CCNC: 211 U/L (ref 0–50)
BASOPHILS # BLD: 0.19 K/UL (ref 0–0.2)
BASOPHILS NFR BLD: 1 % (ref 0–2)
BILIRUB SERPL-MCNC: 1.3 MG/DL (ref 0–1.2)
BUN SERPL-MCNC: 18 MG/DL (ref 8–23)
CALCIUM SERPL-MCNC: 8.9 MG/DL (ref 8.8–10.2)
CHLORIDE SERPL-SCNC: 102 MMOL/L (ref 98–107)
CO2 SERPL-SCNC: 24 MMOL/L (ref 22–29)
CREAT SERPL-MCNC: 1 MG/DL (ref 0.7–1.2)
CRP SERPL HS-MCNC: 28.2 MG/L (ref 0–5)
EOSINOPHIL # BLD: 0 K/UL (ref 0.05–0.5)
EOSINOPHILS RELATIVE PERCENT: 0 % (ref 0–6)
ERYTHROCYTE [DISTWIDTH] IN BLOOD BY AUTOMATED COUNT: 12.1 % (ref 11.5–15)
ERYTHROCYTE [SEDIMENTATION RATE] IN BLOOD BY WESTERGREN METHOD: 27 MM/HR (ref 0–15)
GFR, ESTIMATED: 76 ML/MIN/1.73M2
GLUCOSE SERPL-MCNC: 183 MG/DL (ref 74–99)
HCT VFR BLD AUTO: 39.9 % (ref 37–54)
HGB BLD-MCNC: 12.9 G/DL (ref 12.5–16.5)
LACTATE BLDV-SCNC: 2.8 MMOL/L (ref 0.5–1.9)
LACTATE BLDV-SCNC: 3.3 MMOL/L (ref 0.5–1.9)
LACTATE BLDV-SCNC: 3.6 MMOL/L (ref 0.5–1.9)
LACTATE BLDV-SCNC: 5.5 MMOL/L (ref 0.5–2.2)
LIPASE SERPL-CCNC: 32 U/L (ref 13–60)
LYMPHOCYTES NFR BLD: 0.19 K/UL (ref 1.5–4)
LYMPHOCYTES RELATIVE PERCENT: 1 % (ref 20–42)
MCH RBC QN AUTO: 32.5 PG (ref 26–35)
MCHC RBC AUTO-ENTMCNC: 32.3 G/DL (ref 32–34.5)
MCV RBC AUTO: 100.5 FL (ref 80–99.9)
METAMYELOCYTES ABSOLUTE COUNT: 0.19 K/UL (ref 0–0.12)
METAMYELOCYTES: 1 % (ref 0–1)
MONOCYTES NFR BLD: 0.37 K/UL (ref 0.1–0.95)
MONOCYTES NFR BLD: 2 % (ref 2–12)
NEUTROPHILS NFR BLD: 96 % (ref 43–80)
NEUTS SEG NFR BLD: 20.57 K/UL (ref 1.8–7.3)
NON-GYN CYTOLOGY REPORT: NORMAL
PLATELET # BLD AUTO: 265 K/UL (ref 130–450)
PMV BLD AUTO: 9.5 FL (ref 7–12)
POTASSIUM SERPL-SCNC: 3.9 MMOL/L (ref 3.5–5.1)
PROT SERPL-MCNC: 6.1 G/DL (ref 6.4–8.3)
RBC # BLD AUTO: 3.97 M/UL (ref 3.8–5.8)
RBC # BLD: NORMAL 10*6/UL
SODIUM SERPL-SCNC: 139 MMOL/L (ref 136–145)
WBC OTHER # BLD: 21.5 K/UL (ref 4.5–11.5)

## 2025-07-03 PROCEDURE — 99285 EMERGENCY DEPT VISIT HI MDM: CPT

## 2025-07-03 PROCEDURE — 96365 THER/PROPH/DIAG IV INF INIT: CPT

## 2025-07-03 PROCEDURE — 85652 RBC SED RATE AUTOMATED: CPT

## 2025-07-03 PROCEDURE — 80053 COMPREHEN METABOLIC PANEL: CPT

## 2025-07-03 PROCEDURE — 83605 ASSAY OF LACTIC ACID: CPT

## 2025-07-03 PROCEDURE — 87040 BLOOD CULTURE FOR BACTERIA: CPT

## 2025-07-03 PROCEDURE — 85025 COMPLETE CBC W/AUTO DIFF WBC: CPT

## 2025-07-03 PROCEDURE — 87150 DNA/RNA AMPLIFIED PROBE: CPT

## 2025-07-03 PROCEDURE — 2580000003 HC RX 258: Performed by: EMERGENCY MEDICINE

## 2025-07-03 PROCEDURE — 6360000002 HC RX W HCPCS: Performed by: EMERGENCY MEDICINE

## 2025-07-03 PROCEDURE — 6360000002 HC RX W HCPCS: Performed by: FAMILY MEDICINE

## 2025-07-03 PROCEDURE — 2060000000 HC ICU INTERMEDIATE R&B

## 2025-07-03 PROCEDURE — 86140 C-REACTIVE PROTEIN: CPT

## 2025-07-03 PROCEDURE — 2580000003 HC RX 258: Performed by: FAMILY MEDICINE

## 2025-07-03 PROCEDURE — 83690 ASSAY OF LIPASE: CPT

## 2025-07-03 RX ORDER — ENOXAPARIN SODIUM 100 MG/ML
40 INJECTION SUBCUTANEOUS DAILY
Status: DISCONTINUED | OUTPATIENT
Start: 2025-07-03 | End: 2025-07-07 | Stop reason: HOSPADM

## 2025-07-03 RX ORDER — LANOLIN ALCOHOL/MO/W.PET/CERES
400 CREAM (GRAM) TOPICAL DAILY
Status: DISCONTINUED | OUTPATIENT
Start: 2025-07-04 | End: 2025-07-07 | Stop reason: HOSPADM

## 2025-07-03 RX ORDER — 0.9 % SODIUM CHLORIDE 0.9 %
1000 INTRAVENOUS SOLUTION INTRAVENOUS ONCE
Status: COMPLETED | OUTPATIENT
Start: 2025-07-03 | End: 2025-07-03

## 2025-07-03 RX ORDER — POLYETHYLENE GLYCOL 3350 17 G/17G
17 POWDER, FOR SOLUTION ORAL DAILY PRN
Status: DISCONTINUED | OUTPATIENT
Start: 2025-07-03 | End: 2025-07-07 | Stop reason: HOSPADM

## 2025-07-03 RX ORDER — ACETAMINOPHEN 325 MG/1
650 TABLET ORAL EVERY 6 HOURS PRN
Status: DISCONTINUED | OUTPATIENT
Start: 2025-07-03 | End: 2025-07-07 | Stop reason: HOSPADM

## 2025-07-03 RX ORDER — SODIUM CHLORIDE 9 MG/ML
INJECTION, SOLUTION INTRAVENOUS PRN
Status: DISCONTINUED | OUTPATIENT
Start: 2025-07-03 | End: 2025-07-07 | Stop reason: HOSPADM

## 2025-07-03 RX ORDER — AMLODIPINE BESYLATE 5 MG/1
5 TABLET ORAL DAILY
Status: DISCONTINUED | OUTPATIENT
Start: 2025-07-03 | End: 2025-07-07 | Stop reason: HOSPADM

## 2025-07-03 RX ORDER — SODIUM CHLORIDE 9 MG/ML
INJECTION, SOLUTION INTRAVENOUS CONTINUOUS
Status: DISCONTINUED | OUTPATIENT
Start: 2025-07-03 | End: 2025-07-05

## 2025-07-03 RX ORDER — SODIUM CHLORIDE 0.9 % (FLUSH) 0.9 %
5-40 SYRINGE (ML) INJECTION EVERY 12 HOURS SCHEDULED
Status: DISCONTINUED | OUTPATIENT
Start: 2025-07-03 | End: 2025-07-07 | Stop reason: HOSPADM

## 2025-07-03 RX ORDER — SODIUM CHLORIDE 0.9 % (FLUSH) 0.9 %
5-40 SYRINGE (ML) INJECTION PRN
Status: DISCONTINUED | OUTPATIENT
Start: 2025-07-03 | End: 2025-07-07 | Stop reason: HOSPADM

## 2025-07-03 RX ORDER — ACETAMINOPHEN 650 MG/1
650 SUPPOSITORY RECTAL EVERY 6 HOURS PRN
Status: DISCONTINUED | OUTPATIENT
Start: 2025-07-03 | End: 2025-07-07 | Stop reason: HOSPADM

## 2025-07-03 RX ADMIN — ENOXAPARIN SODIUM 40 MG: 100 INJECTION SUBCUTANEOUS at 12:38

## 2025-07-03 RX ADMIN — SODIUM CHLORIDE 1000 ML: 0.9 INJECTION, SOLUTION INTRAVENOUS at 08:41

## 2025-07-03 RX ADMIN — SODIUM CHLORIDE: 0.9 INJECTION, SOLUTION INTRAVENOUS at 19:05

## 2025-07-03 RX ADMIN — MEROPENEM 1000 MG: 1 INJECTION INTRAVENOUS at 17:42

## 2025-07-03 RX ADMIN — SODIUM CHLORIDE 1000 ML: 9 INJECTION, SOLUTION INTRAVENOUS at 10:26

## 2025-07-03 RX ADMIN — MEROPENEM 1000 MG: 1 INJECTION INTRAVENOUS at 09:47

## 2025-07-03 RX ADMIN — SODIUM CHLORIDE: 0.9 INJECTION, SOLUTION INTRAVENOUS at 12:38

## 2025-07-03 ASSESSMENT — LIFESTYLE VARIABLES
HOW MANY STANDARD DRINKS CONTAINING ALCOHOL DO YOU HAVE ON A TYPICAL DAY: PATIENT DOES NOT DRINK
HOW OFTEN DO YOU HAVE A DRINK CONTAINING ALCOHOL: NEVER

## 2025-07-03 ASSESSMENT — PAIN - FUNCTIONAL ASSESSMENT: PAIN_FUNCTIONAL_ASSESSMENT: NONE - DENIES PAIN

## 2025-07-03 ASSESSMENT — PAIN SCALES - GENERAL: PAINLEVEL_OUTOF10: 0

## 2025-07-03 NOTE — ED NOTES
ED to Inpatient Handoff Report    Notified Ronda that electronic handoff available and patient ready for transport to room 447.    Safety Risks: Hearing problems and Risk of falls    Patient in Restraints: no    Constant Observer or Patient : no    Telemetry Monitoring Ordered :Yes           Order to transfer to unit without monitor:YES    Last MEWS: 1 Time completed: 1116    Deterioration Index Score:   Predictive Model Details          22 (Normal)  Factor Value    Calculated 7/3/2025 11:27 61% Age 80 years old    Deterioration Index Model 18% WBC count abnormal (21.5 k/uL)     12% Respiratory rate 18     5% Systolic 105     2% Sodium 139 mmol/L     1% Pulse 82     0% Temperature 98.8 °F (37.1 °C)     0% Pulse oximetry 95 %     0% Potassium 3.9 mmol/L     0% Hematocrit 39.9 %        Vitals:    07/03/25 0814 07/03/25 0952 07/03/25 1101 07/03/25 1115   BP: 101/61 (!) 95/54 (!) 101/57 105/63   Pulse: 85 85 77 82   Resp: 17 19 16 18   Temp: 98.8 °F (37.1 °C)   98.8 °F (37.1 °C)   TempSrc: Oral   Oral   SpO2: 99% 95% 96% 95%   Weight:       Height:             Opportunity for questions and clarification was provided.

## 2025-07-03 NOTE — ED PROVIDER NOTES
ertapenem (INVanz) 1,000 mg in sodium chloride (PF) 0.9 % 10 mL IV syringe (1,000 mg IntraVENous Given 7/6/25 1552)   sodium chloride flush 0.9 % injection 5-40 mL (10 mLs IntraVENous Given 7/6/25 2028)   sodium chloride flush 0.9 % injection 5-40 mL (20 mLs IntraVENous Given 7/6/25 1538)   0.9 % sodium chloride infusion (has no administration in time range)   lactobacillus (CULTURELLE) capsule 1 capsule (1 capsule Oral Given 7/6/25 0829)   sodium chloride 0.9 % bolus 1,000 mL (0 mLs IntraVENous Stopped 7/3/25 1124)   meropenem (MERREM) 1,000 mg in sodium chloride 0.9 % 100 mL IVPB (0 mg IntraVENous Stopped 7/3/25 1025)   sodium chloride 0.9 % bolus 1,000 mL (0 mLs IntraVENous Stopped 7/3/25 1124)   lidocaine PF 1 % injection 50 mg (10 mg IntraDERmal Given 7/6/25 1515)       JUANITA  .  Patient 80 he had a biliary stent replaced yesterday with Dr. Garnett.  He had chills and fatigue overnight.  His white count yesterday was 20.  Lab work is all pending to evaluate for rising white count and elevated bilirubin.  Dr. Garnett was kind enough to field my call and we decided that if his white count is going up or he has acute lab abnormalities such as rising bili likely needs admission and if his workup is reassuring may be appropriate for outpatient follow-up and antibiotics dispo pending lab work.  Considered CT abdomen and pelvis but would not obtain imaging given benign exam    REVAL:     Patient's lactate is 5 his white count is rising plan to treat with broad-spectrum antibiotics and admit for suspected sepsis bacteremia    SEP-1 CORE MEASURE DATA      SIRS Criteria   Sepsis Criteria   Severe Sepsis Criteria   Septic Shock Criteria       Must meet 2:    [] Temperature > 100.4 F (38 C)        or < 96.8 F (36 C)  [] HR > 90  [x] RR > 20  [x] WBC > 12 or < 4 or 10% bands Must be confirmed or suspected to move forward with diagnosis of sepsis.    [] Infection Confirmed or        Suspected.                          [] No

## 2025-07-04 LAB
ALBUMIN SERPL-MCNC: 2.8 G/DL (ref 3.5–5.2)
ALP SERPL-CCNC: 181 U/L (ref 40–129)
ALT SERPL-CCNC: 224 U/L (ref 0–50)
ANION GAP SERPL CALCULATED.3IONS-SCNC: 8 MMOL/L (ref 7–16)
AST SERPL-CCNC: 97 U/L (ref 0–50)
BASOPHILS # BLD: 0.05 K/UL (ref 0–0.2)
BASOPHILS NFR BLD: 0 % (ref 0–2)
BILIRUB SERPL-MCNC: 1.2 MG/DL (ref 0–1.2)
BUN SERPL-MCNC: 15 MG/DL (ref 8–23)
CALCIUM SERPL-MCNC: 8.3 MG/DL (ref 8.8–10.2)
CHLORIDE SERPL-SCNC: 108 MMOL/L (ref 98–107)
CO2 SERPL-SCNC: 22 MMOL/L (ref 22–29)
CREAT SERPL-MCNC: 0.8 MG/DL (ref 0.7–1.2)
EOSINOPHIL # BLD: 0.08 K/UL (ref 0.05–0.5)
EOSINOPHILS RELATIVE PERCENT: 0 % (ref 0–6)
ERYTHROCYTE [DISTWIDTH] IN BLOOD BY AUTOMATED COUNT: 12.8 % (ref 11.5–15)
GFR, ESTIMATED: >90 ML/MIN/1.73M2
GLUCOSE SERPL-MCNC: 110 MG/DL (ref 74–99)
HCT VFR BLD AUTO: 34.3 % (ref 37–54)
HGB BLD-MCNC: 10.8 G/DL (ref 12.5–16.5)
IMM GRANULOCYTES # BLD AUTO: 0.1 K/UL (ref 0–0.58)
IMM GRANULOCYTES NFR BLD: 1 % (ref 0–5)
LACTATE BLDV-SCNC: 1.8 MMOL/L (ref 0.5–1.9)
LACTATE BLDV-SCNC: 1.9 MMOL/L (ref 0.5–1.9)
LYMPHOCYTES NFR BLD: 0.92 K/UL (ref 1.5–4)
LYMPHOCYTES RELATIVE PERCENT: 5 % (ref 20–42)
MCH RBC QN AUTO: 32 PG (ref 26–35)
MCHC RBC AUTO-ENTMCNC: 31.5 G/DL (ref 32–34.5)
MCV RBC AUTO: 101.8 FL (ref 80–99.9)
MONOCYTES NFR BLD: 1.02 K/UL (ref 0.1–0.95)
MONOCYTES NFR BLD: 6 % (ref 2–12)
NEUTROPHILS NFR BLD: 88 % (ref 43–80)
NEUTS SEG NFR BLD: 15.94 K/UL (ref 1.8–7.3)
PLATELET # BLD AUTO: 215 K/UL (ref 130–450)
PMV BLD AUTO: 9.8 FL (ref 7–12)
POTASSIUM SERPL-SCNC: 4.7 MMOL/L (ref 3.5–5.1)
PROT SERPL-MCNC: 5.2 G/DL (ref 6.4–8.3)
RBC # BLD AUTO: 3.37 M/UL (ref 3.8–5.8)
SODIUM SERPL-SCNC: 138 MMOL/L (ref 136–145)
WBC OTHER # BLD: 18.1 K/UL (ref 4.5–11.5)

## 2025-07-04 PROCEDURE — 6360000002 HC RX W HCPCS: Performed by: FAMILY MEDICINE

## 2025-07-04 PROCEDURE — 6370000000 HC RX 637 (ALT 250 FOR IP): Performed by: FAMILY MEDICINE

## 2025-07-04 PROCEDURE — 2580000003 HC RX 258: Performed by: FAMILY MEDICINE

## 2025-07-04 PROCEDURE — 80053 COMPREHEN METABOLIC PANEL: CPT

## 2025-07-04 PROCEDURE — 83605 ASSAY OF LACTIC ACID: CPT

## 2025-07-04 PROCEDURE — 85025 COMPLETE CBC W/AUTO DIFF WBC: CPT

## 2025-07-04 PROCEDURE — 6370000000 HC RX 637 (ALT 250 FOR IP): Performed by: NURSE PRACTITIONER

## 2025-07-04 PROCEDURE — 2060000000 HC ICU INTERMEDIATE R&B

## 2025-07-04 PROCEDURE — 2580000003 HC RX 258: Performed by: STUDENT IN AN ORGANIZED HEALTH CARE EDUCATION/TRAINING PROGRAM

## 2025-07-04 PROCEDURE — 2500000003 HC RX 250 WO HCPCS: Performed by: FAMILY MEDICINE

## 2025-07-04 RX ORDER — DOCUSATE SODIUM 100 MG/1
100 CAPSULE, LIQUID FILLED ORAL 2 TIMES DAILY
Status: DISCONTINUED | OUTPATIENT
Start: 2025-07-04 | End: 2025-07-07 | Stop reason: HOSPADM

## 2025-07-04 RX ORDER — SIMETHICONE 80 MG
80 TABLET,CHEWABLE ORAL EVERY 6 HOURS PRN
Status: DISCONTINUED | OUTPATIENT
Start: 2025-07-04 | End: 2025-07-07 | Stop reason: HOSPADM

## 2025-07-04 RX ADMIN — ACETAMINOPHEN 650 MG: 325 TABLET ORAL at 20:19

## 2025-07-04 RX ADMIN — AMLODIPINE BESYLATE 5 MG: 5 TABLET ORAL at 10:23

## 2025-07-04 RX ADMIN — MAGNESIUM OXIDE 400 MG (241.3 MG MAGNESIUM) TABLET 400 MG: TABLET at 10:23

## 2025-07-04 RX ADMIN — SODIUM CHLORIDE, PRESERVATIVE FREE 10 ML: 5 INJECTION INTRAVENOUS at 10:23

## 2025-07-04 RX ADMIN — SODIUM CHLORIDE: 0.9 INJECTION, SOLUTION INTRAVENOUS at 13:59

## 2025-07-04 RX ADMIN — DOCUSATE SODIUM 100 MG: 100 CAPSULE, LIQUID FILLED ORAL at 13:59

## 2025-07-04 RX ADMIN — MEROPENEM 1000 MG: 1 INJECTION INTRAVENOUS at 01:54

## 2025-07-04 RX ADMIN — MEROPENEM 1000 MG: 1 INJECTION INTRAVENOUS at 18:37

## 2025-07-04 RX ADMIN — SODIUM CHLORIDE, PRESERVATIVE FREE 10 ML: 5 INJECTION INTRAVENOUS at 20:20

## 2025-07-04 RX ADMIN — MEROPENEM 1000 MG: 1 INJECTION INTRAVENOUS at 10:28

## 2025-07-04 NOTE — H&P
Family History:       Problem Relation Age of Onset    Cancer Mother         breast    Dementia Father     Heart Disease Father        REVIEW OF SYSTEMS:    As above in the HPI, otherwise negative  negative    Vitals:  /63   Pulse 55   Temp 98.1 °F (36.7 °C) (Oral)   Resp 18   Ht 1.626 m (5' 4\")   Wt 65.8 kg (145 lb)   SpO2 94%   BMI 24.89 kg/m²     PHYSICAL EXAM:  General:  Awake, alert, oriented X 3.  Well developed, well nourished, well groomed.  No apparent distress.  HEENT:  Normocephalic, atraumatic.  Pupils equal, round, reactive to light.  No scleral icterus.  No conjunctival injection.  Normal lips, teeth, and gums.  No nasal discharge.  Neck:  Supple, FROM  Heart:  RRR, no murmurs, gallops, rubs, carotid upstroke normal, no carotid bruits  Lungs:  CTA bilaterally, bilat symmetrical expansion, no wheeze, rales, or rhonchi  Abdomen:  Bowel sounds present, soft, nontender, no masses, no organomegaly, no peritoneal signs  Extremities:  No clubbing, cyanosis, or edema  Skin:  Warm and dry, no open lesions or rash  Neuro:  Cranial nerves 2-12 intact, no focal deficits  Vascular: Radial and pedal Pulses 2+  Breast: deferred  Rectal: deferred  Genitalia:  deferred      DATA:     Recent Labs     07/03/25  0829 07/04/25  0251   WBC 21.5* 18.1*   HGB 12.9 10.8*    215     Recent Labs     07/03/25  0829 07/04/25  0251    138   K 3.9 4.7   BUN 18 15   CREATININE 1.0 0.8     No results for input(s): \"INR\" in the last 72 hours.    Invalid input(s): \"PROT\"  Recent Labs     07/03/25  0829 07/04/25  0251   * 97*   * 224*   ALKPHOS 246* 181*   BILITOT 1.3* 1.2     No results for input(s): \"BNP\" in the last 72 hours.  No results for input(s): \"CKTOTAL\", \"CKMB\", \"CKMBINDEX\", \"TROPONINI\" in the last 72 hours.      FLUORO FOR SURGICAL PROCEDURES  Result Date: 7/2/2025  Intraprocedural fluoroscopic spot images as above.  See separate procedure report for more information.

## 2025-07-04 NOTE — CONSULTS
[facility]  [facility]  Consultation Note     Admit Date: 7/3/2025  8:14 AM    Reason for Consult:   Sepsis    Attending Physician:  Stormy Dash MD    HISTORY OF PRESENT ILLNESS:             The history is obtained from extensive review of available past medical records. The patient is a 80 y.o. male who is previously known to the ID service.  The patient had an ERCP [facility].  This was followed by cholecystectomy at [facility] on 6/23/2025.  He was seen in the ED on 6/24/2025 with constipation.  White count was 18.5.  Lactic acid was normal.  ALT was 71 and AST was 52.  He was discharged home.  He had a stent placed.  He had another ERCP with sphincterectomy with balloon extraction of stone on 7/2/2025.  The patient comes to the ED at [facility] on 7/3/2025 with shaking chills and fatigue.  On presentation vital signs were normal.  Later on had a drop in blood pressure.  White count was 21.5.  ALT was 340 with an AST of 211.  Bilirubin was 1.3.  Lactic acid was 5.5.  He was treated with a dose of Meropenem.    Past Medical History:  October 2022.  Admitted to [facility] with fever and left-sided flank pain.  Treated with Ceftriaxone and Metronidazole seen by Dr. 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  Urine cultures grew pansensitive E. coli.  Underwent laparoscopic left colon resection with anastomosis.  The patient was discharged on Cefdinir.    April 2018.  Admitted to [facility] with a diverticular abscess.  He had been treated with Ciprofloxacin and Metronidazole but had an ADR to Ciprofloxacin.  Seen by Dr. Strauss.  Treated with Meropenem.        Diagnosis Date    Arthritis     Benign prostatic hyperplasia     Diverticulosis     Erectile dysfunction     Impotence of organic origin    GERD (gastroesophageal reflux disease)     Heart murmur     History of prostate cancer     prostate- disorder of prostate    History of 
pneumoperitoneum, compatible with recent surgery.       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 Every effort has been made to ensure accuracy, however; inadvertent computerized transcription errors may be present.     Thank you very much for your consultation. We will follow closely with you.    Discussed with Dr. Garnett  Treatment plan developed by Dr. Tamir Doll, APRGOPI-NP-C 7/4/2025 11:38 AM for Dr. Garnett      GI Attending Addendum:  The patient was seen and examined independently from the midlevel team. The case was discuss with the team and the above assessment and plan was developed. Leukocytosis, bili, and liver chem are improving. CT from 6/24 was reviewed. Narrowing of PV possible thrombosis. Awaiting pathology from cholangioscopy. Additional imaging to be considered pending results/clinical course, ie doppler vs CTA. Biliary stricture currently of unknown etiology, possible malignant vs.  ischemia vs mirrizzi like process (case was previous discussed post cholecystectomy w/ Dr. Jimenes, inflammatory process at the cystic duct was noted with irregular/uncommon course). The latter currently favored. As for this admission, likely sequela of cholangioscopy, pt appears improved.  Yared Garnett, DO

## 2025-07-04 NOTE — PLAN OF CARE
Problem: ABCDS Injury Assessment  Goal: Absence of physical injury  7/4/2025 1221 by Hermila Leary RN  Outcome: Progressing     Problem: Discharge Planning  Goal: Discharge to home or other facility with appropriate resources  7/4/2025 1221 by Hermila Leary RN  Outcome: Progressing     Problem: Safety - Adult  Goal: Free from fall injury  7/4/2025 1221 by Hermila Leary RN  Outcome: Progressing     Problem: Skin/Tissue Integrity  Goal: Skin integrity remains intact  Description: 1.  Monitor for areas of redness and/or skin breakdown  2.  Assess vascular access sites hourly  3.  Every 4-6 hours minimum:  Change oxygen saturation probe site  4.  Every 4-6 hours:  If on nasal continuous positive airway pressure, respiratory therapy assess nares and determine need for appliance change or resting period  7/4/2025 1221 by Hermila Leary RN  Outcome: Progressing     Problem: Cardiovascular - Adult  Goal: Maintains optimal cardiac output and hemodynamic stability  7/4/2025 1221 by Hermila Leary RN  Outcome: Progressing     Problem: Skin/Tissue Integrity - Adult  Goal: Skin integrity remains intact  Description: 1.  Monitor for areas of redness and/or skin breakdown  2.  Assess vascular access sites hourly  3.  Every 4-6 hours minimum:  Change oxygen saturation probe site  4.  Every 4-6 hours:  If on nasal continuous positive airway pressure, respiratory therapy assess nares and determine need for appliance change or resting period  7/4/2025 1221 by Hermila Leary RN  Outcome: Progressing     Problem: Gastrointestinal - Adult  Goal: Minimal or absence of nausea and vomiting  7/4/2025 1221 by Hermila Leary RN  Outcome: Progressing     Problem: Gastrointestinal - Adult  Goal: Maintains or returns to baseline bowel function  7/4/2025 1221 by Hermila Leary RN  Outcome: Progressing     Problem: Gastrointestinal - Adult  Goal: Maintains adequate nutritional intake  7/4/2025 1221 by Hermila Leary

## 2025-07-05 PROBLEM — K83.09 ASCENDING CHOLANGITIS (HCC): Status: ACTIVE | Noted: 2025-07-05

## 2025-07-05 PROBLEM — B96.89 BACTEREMIA DUE TO ENTEROBACTER SPECIES: Status: ACTIVE | Noted: 2018-04-10

## 2025-07-05 LAB
ACB COMPLEX DNA BLD POS QL NAA+NON-PROBE: NOT DETECTED
ALBUMIN SERPL-MCNC: 2.8 G/DL (ref 3.5–5.2)
ALP SERPL-CCNC: 194 U/L (ref 40–129)
ALT SERPL-CCNC: 139 U/L (ref 0–50)
ANION GAP SERPL CALCULATED.3IONS-SCNC: 8 MMOL/L (ref 7–16)
AST SERPL-CCNC: 37 U/L (ref 0–50)
B FRAGILIS DNA BLD POS QL NAA+NON-PROBE: NOT DETECTED
BASOPHILS # BLD: 0.03 K/UL (ref 0–0.2)
BASOPHILS NFR BLD: 0 % (ref 0–2)
BILIRUB SERPL-MCNC: 0.6 MG/DL (ref 0–1.2)
BIOFIRE TEST COMMENT: ABNORMAL
BLACTX-M ISLT/SPM QL: NOT DETECTED
BLAIMP ISLT/SPM QL: NOT DETECTED
BLAKPC ISLT/SPM QL: NOT DETECTED
BLAOXA-48-LIKE ISLT/SPM QL: NOT DETECTED
BLAVIM ISLT/SPM QL: NOT DETECTED
BUN SERPL-MCNC: 13 MG/DL (ref 8–23)
C ALBICANS DNA BLD POS QL NAA+NON-PROBE: NOT DETECTED
C AURIS DNA BLD POS QL NAA+NON-PROBE: NOT DETECTED
C GATTII+NEOFOR DNA BLD POS QL NAA+N-PRB: NOT DETECTED
C GLABRATA DNA BLD POS QL NAA+NON-PROBE: NOT DETECTED
C KRUSEI DNA BLD POS QL NAA+NON-PROBE: NOT DETECTED
C PARAP DNA BLD POS QL NAA+NON-PROBE: NOT DETECTED
C TROPICLS DNA BLD POS QL NAA+NON-PROBE: NOT DETECTED
CALCIUM SERPL-MCNC: 8.5 MG/DL (ref 8.8–10.2)
CEA SERPL-MCNC: 2.6 NG/ML (ref 0–5.2)
CHLORIDE SERPL-SCNC: 110 MMOL/L (ref 98–107)
CO2 SERPL-SCNC: 24 MMOL/L (ref 22–29)
COLISTIN RES MCR-1 ISLT/SPM QL: NOT DETECTED
CREAT SERPL-MCNC: 0.7 MG/DL (ref 0.7–1.2)
E CLOAC COMP DNA BLD POS NAA+NON-PROBE: DETECTED
E COLI DNA BLD POS QL NAA+NON-PROBE: NOT DETECTED
E FAECALIS DNA BLD POS QL NAA+NON-PROBE: NOT DETECTED
E FAECIUM DNA BLD POS QL NAA+NON-PROBE: NOT DETECTED
ENTEROBACTERALES DNA BLD POS NAA+N-PRB: DETECTED
EOSINOPHIL # BLD: 0.18 K/UL (ref 0.05–0.5)
EOSINOPHILS RELATIVE PERCENT: 2 % (ref 0–6)
ERYTHROCYTE [DISTWIDTH] IN BLOOD BY AUTOMATED COUNT: 12.5 % (ref 11.5–15)
GFR, ESTIMATED: >90 ML/MIN/1.73M2
GLUCOSE SERPL-MCNC: 117 MG/DL (ref 74–99)
GP B STREP DNA BLD POS QL NAA+NON-PROBE: NOT DETECTED
HAEM INFLU DNA BLD POS QL NAA+NON-PROBE: NOT DETECTED
HCT VFR BLD AUTO: 36.6 % (ref 37–54)
HGB BLD-MCNC: 11.9 G/DL (ref 12.5–16.5)
IMM GRANULOCYTES # BLD AUTO: 0.05 K/UL (ref 0–0.58)
IMM GRANULOCYTES NFR BLD: 0 % (ref 0–5)
K OXYTOCA DNA BLD POS QL NAA+NON-PROBE: NOT DETECTED
KLEBSIELLA SP DNA BLD POS QL NAA+NON-PRB: NOT DETECTED
KLEBSIELLA SP DNA BLD POS QL NAA+NON-PRB: NOT DETECTED
L MONOCYTOG DNA BLD POS QL NAA+NON-PROBE: NOT DETECTED
LACTATE BLDV-SCNC: 0.9 MMOL/L (ref 0.5–1.9)
LACTATE BLDV-SCNC: 0.9 MMOL/L (ref 0.5–1.9)
LYMPHOCYTES NFR BLD: 0.91 K/UL (ref 1.5–4)
LYMPHOCYTES RELATIVE PERCENT: 8 % (ref 20–42)
MCH RBC QN AUTO: 32.6 PG (ref 26–35)
MCHC RBC AUTO-ENTMCNC: 32.5 G/DL (ref 32–34.5)
MCV RBC AUTO: 100.3 FL (ref 80–99.9)
MICROORGANISM SPEC CULT: ABNORMAL
MICROORGANISM/AGENT SPEC: ABNORMAL
MONOCYTES NFR BLD: 0.74 K/UL (ref 0.1–0.95)
MONOCYTES NFR BLD: 6 % (ref 2–12)
N MEN DNA BLD POS QL NAA+NON-PROBE: NOT DETECTED
NEUTROPHILS NFR BLD: 84 % (ref 43–80)
NEUTS SEG NFR BLD: 9.8 K/UL (ref 1.8–7.3)
P AERUGINOSA DNA BLD POS NAA+NON-PROBE: NOT DETECTED
PLATELET # BLD AUTO: 200 K/UL (ref 130–450)
PMV BLD AUTO: 9.6 FL (ref 7–12)
POTASSIUM SERPL-SCNC: 4.2 MMOL/L (ref 3.5–5.1)
PROT SERPL-MCNC: 5.3 G/DL (ref 6.4–8.3)
PROTEUS SP DNA BLD POS QL NAA+NON-PROBE: NOT DETECTED
RBC # BLD AUTO: 3.65 M/UL (ref 3.8–5.8)
RESISTANT GENE NDM BY PCR: NOT DETECTED
S AUREUS DNA BLD POS QL NAA+NON-PROBE: NOT DETECTED
S AUREUS+CONS DNA BLD POS NAA+NON-PROBE: NOT DETECTED
S EPIDERMIDIS DNA BLD POS QL NAA+NON-PRB: NOT DETECTED
S LUGDUNENSIS DNA BLD POS QL NAA+NON-PRB: NOT DETECTED
S MALTOPHILIA DNA BLD POS QL NAA+NON-PRB: NOT DETECTED
S MARCESCENS DNA BLD POS NAA+NON-PROBE: NOT DETECTED
S PNEUM DNA BLD POS QL NAA+NON-PROBE: NOT DETECTED
S PYO DNA BLD POS QL NAA+NON-PROBE: NOT DETECTED
SALMONELLA DNA BLD POS QL NAA+NON-PROBE: NOT DETECTED
SERVICE CMNT-IMP: ABNORMAL
SODIUM SERPL-SCNC: 141 MMOL/L (ref 136–145)
SPECIMEN DESCRIPTION: ABNORMAL
STREPTOCOCCUS DNA BLD POS NAA+NON-PROBE: NOT DETECTED
WBC OTHER # BLD: 11.7 K/UL (ref 4.5–11.5)

## 2025-07-05 PROCEDURE — 2580000003 HC RX 258: Performed by: STUDENT IN AN ORGANIZED HEALTH CARE EDUCATION/TRAINING PROGRAM

## 2025-07-05 PROCEDURE — 6360000002 HC RX W HCPCS: Performed by: REGISTERED NURSE

## 2025-07-05 PROCEDURE — 86301 IMMUNOASSAY TUMOR CA 19-9: CPT

## 2025-07-05 PROCEDURE — 6370000000 HC RX 637 (ALT 250 FOR IP): Performed by: FAMILY MEDICINE

## 2025-07-05 PROCEDURE — 36415 COLL VENOUS BLD VENIPUNCTURE: CPT

## 2025-07-05 PROCEDURE — 82378 CARCINOEMBRYONIC ANTIGEN: CPT

## 2025-07-05 PROCEDURE — 6360000002 HC RX W HCPCS: Performed by: FAMILY MEDICINE

## 2025-07-05 PROCEDURE — 85025 COMPLETE CBC W/AUTO DIFF WBC: CPT

## 2025-07-05 PROCEDURE — 2580000003 HC RX 258: Performed by: FAMILY MEDICINE

## 2025-07-05 PROCEDURE — 2500000003 HC RX 250 WO HCPCS: Performed by: REGISTERED NURSE

## 2025-07-05 PROCEDURE — 2500000003 HC RX 250 WO HCPCS: Performed by: FAMILY MEDICINE

## 2025-07-05 PROCEDURE — 80053 COMPREHEN METABOLIC PANEL: CPT

## 2025-07-05 PROCEDURE — 83605 ASSAY OF LACTIC ACID: CPT

## 2025-07-05 PROCEDURE — 6370000000 HC RX 637 (ALT 250 FOR IP): Performed by: NURSE PRACTITIONER

## 2025-07-05 PROCEDURE — 2060000000 HC ICU INTERMEDIATE R&B

## 2025-07-05 RX ORDER — SODIUM CHLORIDE 9 MG/ML
INJECTION, SOLUTION INTRAVENOUS PRN
Status: DISCONTINUED | OUTPATIENT
Start: 2025-07-05 | End: 2025-07-07 | Stop reason: HOSPADM

## 2025-07-05 RX ORDER — SODIUM CHLORIDE 0.9 % (FLUSH) 0.9 %
5-40 SYRINGE (ML) INJECTION PRN
Status: DISCONTINUED | OUTPATIENT
Start: 2025-07-05 | End: 2025-07-07 | Stop reason: HOSPADM

## 2025-07-05 RX ORDER — SODIUM CHLORIDE 0.9 % (FLUSH) 0.9 %
5-40 SYRINGE (ML) INJECTION EVERY 12 HOURS SCHEDULED
Status: DISCONTINUED | OUTPATIENT
Start: 2025-07-05 | End: 2025-07-07 | Stop reason: HOSPADM

## 2025-07-05 RX ORDER — LIDOCAINE HYDROCHLORIDE 10 MG/ML
50 INJECTION, SOLUTION EPIDURAL; INFILTRATION; INTRACAUDAL; PERINEURAL ONCE
Status: COMPLETED | OUTPATIENT
Start: 2025-07-05 | End: 2025-07-06

## 2025-07-05 RX ORDER — LACTOBACILLUS RHAMNOSUS GG 10B CELL
1 CAPSULE ORAL
Status: DISCONTINUED | OUTPATIENT
Start: 2025-07-06 | End: 2025-07-07 | Stop reason: HOSPADM

## 2025-07-05 RX ADMIN — DOCUSATE SODIUM 100 MG: 100 CAPSULE, LIQUID FILLED ORAL at 22:02

## 2025-07-05 RX ADMIN — MEROPENEM 1000 MG: 1 INJECTION INTRAVENOUS at 09:20

## 2025-07-05 RX ADMIN — SODIUM CHLORIDE, PRESERVATIVE FREE 10 ML: 5 INJECTION INTRAVENOUS at 22:02

## 2025-07-05 RX ADMIN — DOCUSATE SODIUM 100 MG: 100 CAPSULE, LIQUID FILLED ORAL at 09:15

## 2025-07-05 RX ADMIN — MAGNESIUM OXIDE 400 MG (241.3 MG MAGNESIUM) TABLET 400 MG: TABLET at 09:15

## 2025-07-05 RX ADMIN — SODIUM CHLORIDE: 0.9 INJECTION, SOLUTION INTRAVENOUS at 02:46

## 2025-07-05 RX ADMIN — AMLODIPINE BESYLATE 5 MG: 5 TABLET ORAL at 09:15

## 2025-07-05 RX ADMIN — MAGNESIUM HYDROXIDE 30 ML: 400 SUSPENSION ORAL at 17:45

## 2025-07-05 RX ADMIN — ENOXAPARIN SODIUM 40 MG: 100 INJECTION SUBCUTANEOUS at 09:15

## 2025-07-05 RX ADMIN — ERTAPENEM SODIUM 1000 MG: 1 INJECTION INTRAMUSCULAR; INTRAVENOUS at 13:29

## 2025-07-05 RX ADMIN — MEROPENEM 1000 MG: 1 INJECTION INTRAVENOUS at 02:49

## 2025-07-05 RX ADMIN — SODIUM CHLORIDE, PRESERVATIVE FREE 10 ML: 5 INJECTION INTRAVENOUS at 09:21

## 2025-07-05 NOTE — PLAN OF CARE
Problem: ABCDS Injury Assessment  Goal: Absence of physical injury  Outcome: Progressing     Problem: Discharge Planning  Goal: Discharge to home or other facility with appropriate resources  Outcome: Progressing     Problem: Safety - Adult  Goal: Free from fall injury  Outcome: Progressing     Problem: Skin/Tissue Integrity  Goal: Skin integrity remains intact  Description: 1.  Monitor for areas of redness and/or skin breakdown  2.  Assess vascular access sites hourly  3.  Every 4-6 hours minimum:  Change oxygen saturation probe site  4.  Every 4-6 hours:  If on nasal continuous positive airway pressure, respiratory therapy assess nares and determine need for appliance change or resting period  Outcome: Progressing     Problem: Skin/Tissue Integrity - Adult  Goal: Skin integrity remains intact  Description: 1.  Monitor for areas of redness and/or skin breakdown  2.  Assess vascular access sites hourly  3.  Every 4-6 hours minimum:  Change oxygen saturation probe site  4.  Every 4-6 hours:  If on nasal continuous positive airway pressure, respiratory therapy assess nares and determine need for appliance change or resting period  Outcome: Progressing

## 2025-07-06 ENCOUNTER — APPOINTMENT (OUTPATIENT)
Dept: ULTRASOUND IMAGING | Age: 80
DRG: 919 | End: 2025-07-06
Payer: MEDICARE

## 2025-07-06 LAB
ALBUMIN SERPL-MCNC: 3 G/DL (ref 3.5–5.2)
ALP SERPL-CCNC: 226 U/L (ref 40–129)
ALT SERPL-CCNC: 102 U/L (ref 0–50)
ANION GAP SERPL CALCULATED.3IONS-SCNC: 9 MMOL/L (ref 7–16)
AST SERPL-CCNC: 26 U/L (ref 0–50)
BASOPHILS # BLD: 0.03 K/UL (ref 0–0.2)
BASOPHILS NFR BLD: 0 % (ref 0–2)
BILIRUB SERPL-MCNC: 0.6 MG/DL (ref 0–1.2)
BUN SERPL-MCNC: 13 MG/DL (ref 8–23)
CALCIUM SERPL-MCNC: 9 MG/DL (ref 8.8–10.2)
CHLORIDE SERPL-SCNC: 104 MMOL/L (ref 98–107)
CO2 SERPL-SCNC: 24 MMOL/L (ref 22–29)
CREAT SERPL-MCNC: 0.6 MG/DL (ref 0.7–1.2)
EOSINOPHIL # BLD: 0.25 K/UL (ref 0.05–0.5)
EOSINOPHILS RELATIVE PERCENT: 3 % (ref 0–6)
ERYTHROCYTE [DISTWIDTH] IN BLOOD BY AUTOMATED COUNT: 12.4 % (ref 11.5–15)
GFR, ESTIMATED: >90 ML/MIN/1.73M2
GLUCOSE SERPL-MCNC: 124 MG/DL (ref 74–99)
HCT VFR BLD AUTO: 37.2 % (ref 37–54)
HGB BLD-MCNC: 12.2 G/DL (ref 12.5–16.5)
IMM GRANULOCYTES # BLD AUTO: 0.03 K/UL (ref 0–0.58)
IMM GRANULOCYTES NFR BLD: 0 % (ref 0–5)
LYMPHOCYTES NFR BLD: 1.08 K/UL (ref 1.5–4)
LYMPHOCYTES RELATIVE PERCENT: 12 % (ref 20–42)
MCH RBC QN AUTO: 32.3 PG (ref 26–35)
MCHC RBC AUTO-ENTMCNC: 32.8 G/DL (ref 32–34.5)
MCV RBC AUTO: 98.4 FL (ref 80–99.9)
MONOCYTES NFR BLD: 0.77 K/UL (ref 0.1–0.95)
MONOCYTES NFR BLD: 8 % (ref 2–12)
NEUTROPHILS NFR BLD: 77 % (ref 43–80)
NEUTS SEG NFR BLD: 7.09 K/UL (ref 1.8–7.3)
PLATELET # BLD AUTO: 231 K/UL (ref 130–450)
PMV BLD AUTO: 9.9 FL (ref 7–12)
POTASSIUM SERPL-SCNC: 4.4 MMOL/L (ref 3.5–5.1)
PROT SERPL-MCNC: 5.7 G/DL (ref 6.4–8.3)
RBC # BLD AUTO: 3.78 M/UL (ref 3.8–5.8)
SODIUM SERPL-SCNC: 137 MMOL/L (ref 136–145)
WBC OTHER # BLD: 9.3 K/UL (ref 4.5–11.5)

## 2025-07-06 PROCEDURE — 2500000003 HC RX 250 WO HCPCS: Performed by: FAMILY MEDICINE

## 2025-07-06 PROCEDURE — 80053 COMPREHEN METABOLIC PANEL: CPT

## 2025-07-06 PROCEDURE — 93975 VASCULAR STUDY: CPT

## 2025-07-06 PROCEDURE — 2500000003 HC RX 250 WO HCPCS: Performed by: REGISTERED NURSE

## 2025-07-06 PROCEDURE — 6370000000 HC RX 637 (ALT 250 FOR IP): Performed by: NURSE PRACTITIONER

## 2025-07-06 PROCEDURE — 6370000000 HC RX 637 (ALT 250 FOR IP): Performed by: REGISTERED NURSE

## 2025-07-06 PROCEDURE — 2060000000 HC ICU INTERMEDIATE R&B

## 2025-07-06 PROCEDURE — 6360000002 HC RX W HCPCS: Performed by: REGISTERED NURSE

## 2025-07-06 PROCEDURE — 6370000000 HC RX 637 (ALT 250 FOR IP): Performed by: FAMILY MEDICINE

## 2025-07-06 PROCEDURE — 05HC33Z INSERTION OF INFUSION DEVICE INTO LEFT BASILIC VEIN, PERCUTANEOUS APPROACH: ICD-10-PCS | Performed by: INTERNAL MEDICINE

## 2025-07-06 PROCEDURE — 36410 VNPNXR 3YR/> PHY/QHP DX/THER: CPT

## 2025-07-06 PROCEDURE — 6360000002 HC RX W HCPCS: Performed by: FAMILY MEDICINE

## 2025-07-06 PROCEDURE — 76937 US GUIDE VASCULAR ACCESS: CPT

## 2025-07-06 PROCEDURE — C1751 CATH, INF, PER/CENT/MIDLINE: HCPCS

## 2025-07-06 PROCEDURE — 85025 COMPLETE CBC W/AUTO DIFF WBC: CPT

## 2025-07-06 PROCEDURE — 76705 ECHO EXAM OF ABDOMEN: CPT

## 2025-07-06 RX ADMIN — SODIUM CHLORIDE, PRESERVATIVE FREE 10 ML: 5 INJECTION INTRAVENOUS at 08:30

## 2025-07-06 RX ADMIN — SODIUM CHLORIDE, PRESERVATIVE FREE 10 ML: 5 INJECTION INTRAVENOUS at 20:28

## 2025-07-06 RX ADMIN — DOCUSATE SODIUM 100 MG: 100 CAPSULE, LIQUID FILLED ORAL at 20:28

## 2025-07-06 RX ADMIN — Medication 1 CAPSULE: at 08:29

## 2025-07-06 RX ADMIN — MAGNESIUM OXIDE 400 MG (241.3 MG MAGNESIUM) TABLET 400 MG: TABLET at 08:30

## 2025-07-06 RX ADMIN — LIDOCAINE HYDROCHLORIDE 10 MG: 10 SOLUTION INTRAVENOUS at 15:15

## 2025-07-06 RX ADMIN — ENOXAPARIN SODIUM 40 MG: 100 INJECTION SUBCUTANEOUS at 08:30

## 2025-07-06 RX ADMIN — ERTAPENEM SODIUM 1000 MG: 1 INJECTION INTRAMUSCULAR; INTRAVENOUS at 15:52

## 2025-07-06 RX ADMIN — SODIUM CHLORIDE, PRESERVATIVE FREE 20 ML: 5 INJECTION INTRAVENOUS at 15:38

## 2025-07-06 RX ADMIN — MAGNESIUM HYDROXIDE 30 ML: 400 SUSPENSION ORAL at 20:28

## 2025-07-06 RX ADMIN — AMLODIPINE BESYLATE 5 MG: 5 TABLET ORAL at 08:29

## 2025-07-06 RX ADMIN — DOCUSATE SODIUM 100 MG: 100 CAPSULE, LIQUID FILLED ORAL at 08:30

## 2025-07-06 NOTE — PLAN OF CARE
Problem: ABCDS Injury Assessment  Goal: Absence of physical injury  Outcome: Progressing     Problem: Safety - Adult  Goal: Free from fall injury  Outcome: Progressing     Problem: Skin/Tissue Integrity  Goal: Skin integrity remains intact  Description: 1.  Monitor for areas of redness and/or skin breakdown  2.  Assess vascular access sites hourly  3.  Every 4-6 hours minimum:  Change oxygen saturation probe site  4.  Every 4-6 hours:  If on nasal continuous positive airway pressure, respiratory therapy assess nares and determine need for appliance change or resting period  Outcome: Progressing     Problem: Skin/Tissue Integrity - Adult  Goal: Skin integrity remains intact  Description: 1.  Monitor for areas of redness and/or skin breakdown  2.  Assess vascular access sites hourly  3.  Every 4-6 hours minimum:  Change oxygen saturation probe site  4.  Every 4-6 hours:  If on nasal continuous positive airway pressure, respiratory therapy assess nares and determine need for appliance change or resting period  Outcome: Progressing

## 2025-07-06 NOTE — PROCEDURES
PROCEDURE NOTE  Date: 7/6/2025   Name: Marbin Carroll  YOB: 1945    MIDLINE  Catheter insertion date 7/6/2025     Product Number:  [id number]   Lot No: [id number]   Gauge: 17   Lumen: SINGLE/ 4.5 Gabonese   L Basilic    Vein Diameter: 0.40CM   Mid-Upper arm circumference: 25CM   Catheter Length: 12CM                    Internal Length: 12CM   Exposed Catheter Length: 0CM   Ultrasound Used: YES  Floor notified Midline is good to use.  :  ASMITA MCLAUGHLIN RN, CELESTINO

## 2025-07-07 VITALS
HEIGHT: 64 IN | WEIGHT: 145 LBS | BODY MASS INDEX: 24.75 KG/M2 | OXYGEN SATURATION: 96 % | RESPIRATION RATE: 18 BRPM | SYSTOLIC BLOOD PRESSURE: 111 MMHG | HEART RATE: 58 BPM | TEMPERATURE: 98.2 F | DIASTOLIC BLOOD PRESSURE: 61 MMHG

## 2025-07-07 PROCEDURE — 6370000000 HC RX 637 (ALT 250 FOR IP): Performed by: REGISTERED NURSE

## 2025-07-07 PROCEDURE — 6370000000 HC RX 637 (ALT 250 FOR IP): Performed by: FAMILY MEDICINE

## 2025-07-07 PROCEDURE — 2500000003 HC RX 250 WO HCPCS: Performed by: REGISTERED NURSE

## 2025-07-07 PROCEDURE — 6360000002 HC RX W HCPCS: Performed by: FAMILY MEDICINE

## 2025-07-07 PROCEDURE — 6360000002 HC RX W HCPCS: Performed by: REGISTERED NURSE

## 2025-07-07 RX ORDER — LACTOBACILLUS RHAMNOSUS GG 10B CELL
1 CAPSULE ORAL
Qty: 7 CAPSULE | Refills: 0 | Status: SHIPPED | OUTPATIENT
Start: 2025-07-08 | End: 2025-07-15

## 2025-07-07 RX ADMIN — AMLODIPINE BESYLATE 5 MG: 5 TABLET ORAL at 09:55

## 2025-07-07 RX ADMIN — ERTAPENEM SODIUM 1000 MG: 1 INJECTION INTRAMUSCULAR; INTRAVENOUS at 12:51

## 2025-07-07 RX ADMIN — MAGNESIUM OXIDE 400 MG (241.3 MG MAGNESIUM) TABLET 400 MG: TABLET at 09:55

## 2025-07-07 RX ADMIN — Medication 1 CAPSULE: at 09:55

## 2025-07-07 RX ADMIN — ENOXAPARIN SODIUM 40 MG: 100 INJECTION SUBCUTANEOUS at 09:56

## 2025-07-07 NOTE — CARE COORDINATION
Social Work/Discharge Planning:  Left several messages to [facility] regarding status of referral.  Met with patient and his wife and provided them with an update.  Patient would like to go to the [facility].  Called [facility] (ph: [phone]) and left a message regarding referral.  The infusion center closes at 3:30pm.  Faxed demographic sheet and signed IV script to [facility].  Await response.   did not cancel referral to [facility].  Updated patient and his wife. Electronically signed by MERRILL Cabrera on 7/7/2025 at 3:08 PM    Addendum:  Charis with [facility] states they will confirm if they can obtain authorization today.  Will continue to follow.  Electronically signed by MERRILL Cabrera on 7/7/2025 at 3:45 PM    Addendum:  Patient wife states she received call from [facility] for daily IV at 2:00pm.  Gi with [facility] confirmed patient to have IV antibiotic daily at 2:00pm.  Cancelled referral to Charis with [facility].  Electronically signed by MERRILL Cabrera on 7/7/2025 at 3:55 PM

## 2025-07-07 NOTE — DISCHARGE SUMMARY
[facility]   Discharge summary   Patient ID:  Marbin Carroll  [id number]  80 y.o.  1945    Admit date: 7/3/2025    Discharge date and time: 7/7/2025    Admission Diagnoses:   Patient Active Problem List   Diagnosis    History of prostate cancer    Diverticulosis    History of stroke    Hyperlipidemia    Colonic diverticular abscess    Diverticulitis of colon    Bacteremia due to Enterobacter species    Moderate protein-calorie malnutrition    Hallux rigidus of left foot    Post-op pain    Nonrheumatic aortic valve stenosis    Muscle cramps at night    Essential hypertension, benign    Heart murmur    Impotence of organic origin    Disorder of prostate    Diverticulitis    Acute cystitis without hematuria    Pyelonephritis    Colovesical fistula    Hyperglycemia    Hypophosphatemia    Septic shock (HCC)    Ascending cholangitis (HCC)       Discharge Diagnoses: ***    Consults: {consultation:04188}    Procedures: ***    Hospital Course: The patient is a 80 y.o. male of Car[place] Hi MD with significant past medical history of *** who presents with ***    Recent Labs     07/05/25  0418 07/06/25  0350   WBC 11.7* 9.3   HGB 11.9* 12.2*   HCT 36.6* 37.2    231       Recent Labs     07/05/25  0418 07/06/25  0350    137   K 4.2 4.4   * 104   CO2 24 24   BUN 13 13   CREATININE 0.7 0.6*   CALCIUM 8.5* 9.0       No results found.    Discharge Exam:    HEENT: NCAT,  PERRLA, No JVD  Heart:  RRR, no murmurs, gallops, or rubs.  Lungs:  CTA bilaterally, no wheeze, rales or rhonchi  Abd: bowel sounds present, nontender, nondistended, no masses  Extrem:  No clubbing, cyanosis, or edema    Disposition: {disposition:50858}     Patient Condition at Discharge: ***    Patient Instructions:      Medication List        START taking these medications      ertapenem infusion  Commonly known as: INVanz  Infuse 1,000 mg intravenously every 24 hours for 7 days Compound per protocol.     lactobacillus Caps

## 2025-07-07 NOTE — CARE COORDINATION
Social Work/Discharge Planning:  Received notification patient will need IV Invanz for seven days and prefers [facility].  Left voicemail with [facility] (ph: [phone], fax: [phone]) regarding referral and faxed IV antibiotic script.  Met with patient and his wife regarding discharge planning.  Explained Social Work role.  Patient lives with his wife and is independent with no adaptive device.  He prefers to have IV antibiotic at [facility].  Called Catherine with [facility] (ph: [phone]) and conifrmed the only medication they can administer is Dalvance.  She states they are only open every Wednesday and Friday.  Updated patient and his wife and they are agreeable to going to [facility] in [place], [place].  Will continue to follow and await return call from [facility].  Electronically signed by MERRILL Cabrera on 7/7/2025 at 10:42 AM

## 2025-07-07 NOTE — CARE COORDINATION
Social Work/Discharge Planning;  Received call from Francesca with [facility] stating they are confirming if prior authorization is needed and if they can supply/administer medication starting tomorrow.  Await return call.  Will continue to follow.  Electronically signed by MERRILL Cabrera on 7/7/2025 at 12:32 PM

## 2025-07-07 NOTE — ACP (ADVANCE CARE PLANNING)
Advance Care Planning   Healthcare Decision Maker:    Primary Decision Maker: CarrollFlora Faith [facility] - [phone]    Click here to complete Healthcare Decision Makers including selection of the Healthcare Decision Maker Relationship (ie \"Primary\").

## 2025-07-08 LAB
CANCER AG19-9 SERPL IA-ACNC: 366 U/ML (ref 0–35)
MICROORGANISM SPEC CULT: NORMAL
SERVICE CMNT-IMP: NORMAL
SPECIMEN DESCRIPTION: NORMAL
SURGICAL PATHOLOGY REPORT: NORMAL

## 2025-07-10 NOTE — PROGRESS NOTES
[facility]   Progress note      Subjective:    The patient is awake and alert.    No acute events overnight.    Denies chest pain, angina, SOB.   No fever or chills.    Objective:    /71   Pulse 60   Temp 99 °F (37.2 °C) (Oral)   Resp 18   Ht 1.626 m (5' 4\")   Wt 65.8 kg (145 lb)   SpO2 94%   BMI 24.89 kg/m²     In: 2950.4 [P.O.:240; I.V.:2274.6]  Out: -   In: 2950.4   Out: -     Physical Exam  Vitals reviewed.   Constitutional:       General: He is not in acute distress.     Appearance: Normal appearance. He is normal weight. He is not ill-appearing.   HENT:      Head: Normocephalic and atraumatic.      Mouth/Throat:      Mouth: Mucous membranes are moist.   Cardiovascular:      Rate and Rhythm: Normal rate and regular rhythm.      Pulses: Normal pulses.      Heart sounds: Normal heart sounds. No murmur heard.  Pulmonary:      Effort: Pulmonary effort is normal. No respiratory distress.      Breath sounds: Normal breath sounds. No wheezing or rales.   Abdominal:      General: Abdomen is flat. Bowel sounds are normal. There is no distension.      Palpations: Abdomen is soft.      Tenderness: There is no abdominal tenderness.   Musculoskeletal:      Right lower leg: No edema.      Left lower leg: No edema.   Neurological:      General: No focal deficit present.      Mental Status: He is alert and oriented to person, place, and time. Mental status is at baseline.   Psychiatric:         Mood and Affect: Mood normal.         Behavior: Behavior normal.            Recent Labs     07/03/25  0829 07/04/25  0251 07/05/25  0418   WBC 21.5* 18.1* 11.7*   HGB 12.9 10.8* 11.9*   HCT 39.9 34.3* 36.6*    215 200       Recent Labs     07/03/25  0829 07/04/25  0251 07/05/25  0418    138 141   K 3.9 4.7 4.2    108* 110*   CO2 24 22 24   BUN 18 15 13   CREATININE 1.0 0.8 0.7   CALCIUM 8.9 8.3* 8.5*       Assessment:    Principal Problem:    Septic shock (HCC)  Active Problems:    
    [facility]   Progress note      Subjective:    The patient is awake and alert.    Comfortably in bed in no acute distress  No abdominal pain nausea vomiting chills or fevers    Objective:    /74   Pulse 52   Temp 98.1 °F (36.7 °C) (Oral)   Resp 18   Ht 1.626 m (5' 4\")   Wt 65.8 kg (145 lb)   SpO2 100%   BMI 24.89 kg/m²     In: 358 [I.V.:260.4]  Out: -   In: 358   Out: -     Physical Exam  Vitals reviewed.   Constitutional:       General: He is not in acute distress.     Appearance: Normal appearance. He is normal weight. He is not ill-appearing.   HENT:      Head: Normocephalic and atraumatic.      Mouth/Throat:      Mouth: Mucous membranes are moist.   Cardiovascular:      Rate and Rhythm: Normal rate and regular rhythm.      Pulses: Normal pulses.      Heart sounds: Normal heart sounds. No murmur heard.  Pulmonary:      Effort: Pulmonary effort is normal. No respiratory distress.      Breath sounds: Normal breath sounds. No wheezing or rales.   Abdominal:      General: Abdomen is flat. Bowel sounds are normal. There is no distension.      Palpations: Abdomen is soft.      Tenderness: There is no abdominal tenderness.   Musculoskeletal:      Right lower leg: No edema.      Left lower leg: No edema.   Neurological:      General: No focal deficit present.      Mental Status: He is alert and oriented to person, place, and time. Mental status is at baseline.   Psychiatric:         Mood and Affect: Mood normal.         Behavior: Behavior normal.            Recent Labs     07/04/25  0251 07/05/25  0418 07/06/25  0350   WBC 18.1* 11.7* 9.3   HGB 10.8* 11.9* 12.2*   HCT 34.3* 36.6* 37.2    200 231       Recent Labs     07/04/25  0251 07/05/25  0418 07/06/25  0350    141 137   K 4.7 4.2 4.4   * 110* 104   CO2 22 24 24   BUN 15 13 13   CREATININE 0.8 0.7 0.6*   CALCIUM 8.3* 8.5* 9.0       Assessment:    Principal Problem:    Septic shock (HCC)  Active Problems:    
   [facility]  [facility]  Progress Note    SUBJECTIVE:  Chief Complaint   Patient presents with    Chills     Had a biliary stent replacement yesterday at [facility], reports chills and shaking this morning     Fatigue     Patient is tolerating medications. No reported adverse drug reactions.  Sitting up in the chair.  Wife at bedside  Says he is waiting for breakfast to come  Denies any complaints  No fevers  Explained to him and wife that we are waiting for final sensitivities    Review of systems:  As stated above in the chief complaint, otherwise negative.    Medications:  Scheduled Meds:   docusate sodium  100 mg Oral BID    amLODIPine  5 mg Oral Daily    magnesium oxide  400 mg Oral Daily    sodium chloride flush  5-40 mL IntraVENous 2 times per day    enoxaparin  40 mg SubCUTAneous Daily    meropenem  1,000 mg IntraVENous Q8H     Continuous Infusions:   sodium chloride      sodium chloride Stopped (25 0249)     PRN Meds:simethicone, magnesium hydroxide, polyethylene glycol, sodium chloride flush, sodium chloride, acetaminophen **OR** acetaminophen    OBJECTIVE:  BP (!) 148/85   Pulse 56   Temp 98.1 °F (36.7 °C) (Oral)   Resp 18   Ht 1.626 m (5' 4\")   Wt 65.8 kg (145 lb)   SpO2 96%   BMI 24.89 kg/m²   Temp  Av.7 °F (37.1 °C)  Min: 97.7 °F (36.5 °C)  Max: 100.2 °F (37.9 °C)  Constitutional: The patient is awake, alert, and oriented.  Sitting up in the chair.  Wife at bedside.   Skin: Warm and dry. No rashes were noted.   HEENT: Round and reactive pupils.  Moist mucous membranes.  No ulcerations or thrush.  Neck: Supple to movements.   Chest: No use of accessory muscles to breathe. Symmetrical expansion.  Clear throughout  Cardiovascular: S1 and S2 are rhythmic and regular. No murmurs appreciated.   Abdomen: Positive bowel sounds to auscultation. Benign to palpation. No masses felt.  Lap sites are clean  Extremities: No edema.  Lines: Peripheral.    Laboratory and 
   [facility]  [facility]  Progress Note    SUBJECTIVE:  Chief Complaint   Patient presents with    Chills     Had a biliary stent replacement yesterday at [facility], reports chills and shaking this morning     Fatigue     Patient is tolerating medications. No reported adverse drug reactions.  No nausea, vomiting, diarrhea.  No fevers  Says he is feeling better today, denies abdominal pain    Review of systems:  As stated above in the chief complaint, otherwise negative.    Medications:  Scheduled Meds:   amLODIPine  5 mg Oral Daily    magnesium oxide  400 mg Oral Daily    sodium chloride flush  5-40 mL IntraVENous 2 times per day    enoxaparin  40 mg SubCUTAneous Daily    meropenem  1,000 mg IntraVENous Q8H     Continuous Infusions:   sodium chloride      sodium chloride 150 mL/hr at 25 1905     PRN Meds:polyethylene glycol, sodium chloride flush, sodium chloride, acetaminophen **OR** acetaminophen    OBJECTIVE:  /63   Pulse 55   Temp 98.1 °F (36.7 °C) (Oral)   Resp 18   Ht 1.626 m (5' 4\")   Wt 65.8 kg (145 lb)   SpO2 94%   BMI 24.89 kg/m²   Temp  Av.5 °F (36.9 °C)  Min: 98.1 °F (36.7 °C)  Max: 99.3 °F (37.4 °C)  Constitutional: The patient is awake, alert, and oriented.  Sitting up in bed in no distress.  Skin: Warm and dry. No rashes were noted.   HEENT: Round and reactive pupils.  Moist mucous membranes.  No ulcerations or thrush.  Neck: Supple to movements.   Chest: No use of accessory muscles to breathe. Symmetrical expansion.  No wheezing, crackles or rhonchi.  Cardiovascular: S1 and S2 are rhythmic and regular. No murmurs appreciated.   Abdomen: Positive bowel sounds to auscultation. Benign to palpation. No masses felt.  Lap sites are clean  Extremities: No clubbing, no cyanosis, no edema.  Lines: Peripheral.    Laboratory and Tests:  Lab Results   Component Value Date    CRP 28.2 (H) 2025    CRP <0.1 2018     Lab Results   Component Value Date    
   [facility]  [facility]  Progress Note    SUBJECTIVE:  Chief Complaint   Patient presents with    Chills     Had a biliary stent replacement yesterday at [facility], reports chills and shaking this morning     Fatigue     Patient is tolerating medications. No reported adverse drug reactions.  Sitting up in bed, in no distress  No fevers  Denies any complaints today     Review of systems:  As stated above in the chief complaint, otherwise negative.    Medications:  Scheduled Meds:   ertapenem (INVanz) 1,000 mg in sodium chloride (PF) 0.9 % 10 mL IV syringe  1,000 mg IntraVENous Q24H    sodium chloride flush  5-40 mL IntraVENous 2 times per day    lidocaine 1 % injection  50 mg IntraDERmal Once    lactobacillus  1 capsule Oral Daily with breakfast    docusate sodium  100 mg Oral BID    amLODIPine  5 mg Oral Daily    magnesium oxide  400 mg Oral Daily    sodium chloride flush  5-40 mL IntraVENous 2 times per day    enoxaparin  40 mg SubCUTAneous Daily     Continuous Infusions:   sodium chloride      sodium chloride       PRN Meds:sodium chloride flush, sodium chloride, simethicone, magnesium hydroxide, polyethylene glycol, sodium chloride flush, sodium chloride, acetaminophen **OR** acetaminophen    OBJECTIVE:  /74   Pulse 52   Temp 98.1 °F (36.7 °C) (Oral)   Resp 18   Ht 1.626 m (5' 4\")   Wt 65.8 kg (145 lb)   SpO2 100%   BMI 24.89 kg/m²   Temp  Av.7 °F (37.1 °C)  Min: 98.1 °F (36.7 °C)  Max: 99.2 °F (37.3 °C)  Constitutional: The patient is awake, alert, and oriented. Sitting up in bed, in no distress  Skin: Warm and dry. No rashes were noted.   HEENT: Round and reactive pupils.  Moist mucous membranes.  No ulcerations or thrush.  Neck: Supple to movements.   Chest: No use of accessory muscles to breathe. Symmetrical expansion.  Clear throughout  Cardiovascular: S1 and S2 are rhythmic and regular. No murmurs appreciated.   Abdomen: Positive bowel sounds to auscultation. 
   [facility]  [facility]  Progress Note    SUBJECTIVE:  Chief Complaint   Patient presents with    Chills     Had a biliary stent replacement yesterday at [facility], reports chills and shaking this morning     Fatigue     Patient is tolerating medications. No reported adverse drug reactions.  Sitting up in the chair.  Wife at bedside  No new complaints  No fevers    Review of systems:  As stated above in the chief complaint, otherwise negative.    Medications:  Scheduled Meds:   ertapenem (INVanz) 1,000 mg in sodium chloride (PF) 0.9 % 10 mL IV syringe  1,000 mg IntraVENous Q24H    sodium chloride flush  5-40 mL IntraVENous 2 times per day    lactobacillus  1 capsule Oral Daily with breakfast    docusate sodium  100 mg Oral BID    amLODIPine  5 mg Oral Daily    magnesium oxide  400 mg Oral Daily    sodium chloride flush  5-40 mL IntraVENous 2 times per day    enoxaparin  40 mg SubCUTAneous Daily     Continuous Infusions:   sodium chloride      sodium chloride       PRN Meds:sodium chloride flush, sodium chloride, simethicone, magnesium hydroxide, polyethylene glycol, sodium chloride flush, sodium chloride, acetaminophen **OR** acetaminophen    OBJECTIVE:  /66   Pulse 91   Temp 97.9 °F (36.6 °C) (Oral)   Resp 18   Ht 1.626 m (5' 4\")   Wt 65.8 kg (145 lb)   SpO2 98%   BMI 24.89 kg/m²   Temp  Av.5 °F (36.9 °C)  Min: 97.9 °F (36.6 °C)  Max: 99.3 °F (37.4 °C)  Constitutional: The patient is awake, alert, and oriented.  Sitting up in the chair.  Wife at bedside.  Skin: Warm and dry. No rashes were noted.   HEENT: Round and reactive pupils.  Moist mucous membranes.  No ulcerations or thrush.  Neck: Supple to movements.   Chest: No use of accessory muscles to breathe. Symmetrical expansion.  Clear throughout  Cardiovascular: S1 and S2 are rhythmic and regular. No murmurs appreciated.   Abdomen: Positive bowel sounds to auscultation. Benign to palpation. No masses felt.  Lap sites 
  Physician Progress Note      PATIENT:               SHAWN FISHMAN  CSN #:                  [id number]  :                       1945  ADMIT DATE:       7/3/2025 8:14 AM  DISCH DATE:        2025 5:19 PM  RESPONDING  PROVIDER #:        Stormy Dash MD          QUERY TEXT:    Sepsis is documented in the medical record in the H&P and IM PNs and patient   has a device. Please specify the relationship, if any, between the biliary   stent and the sepsis:    The clinical indicators include:  - 80 y.o. male patient with recent ERCP/biliary stent on  presented with   chills and fatigue. WBC 21.5. Transaminitis. Blood culture positive for   Enterobacter cloacae complex. Admitted with septic shock. (H&P, )  - Status post ERCP with stent exchange and balloon sweep 2025. Probable   ascending cholangitis. (ID consult, 7/3)  - As for this admission, likely sequela of cholangioscopy. (GI consult, )  - IV fluid bolus, Merrem, Invanz (MAR)  Options provided:  -- Sepsis related to biliary stent  -- Sepsis related to ERCP  -- Sepsis unrelated to device or procedure  -- Other - I will add my own diagnosis  -- Disagree - Not applicable / Not valid  -- Disagree - Clinically unable to determine / Unknown  -- Refer to Clinical Documentation Reviewer    PROVIDER RESPONSE TEXT:    This patient has sepsis related to biliary stent.    Query created by: Birgit Mulligan on 2025 10:56 AM      Electronically signed by:  Stormy Dash MD 7/10/2025 7:07 AM          
24 hour chart check complete    
Database initiated pharmacy and medications verified with the patient. He is A&O comes in from home with wife. He uses no assistive devices and is RA at baseline.   
Dr palomares on floor updated on new consult.   
New consult sent to Dr Garnett via perfect serve.  
Notified ID of 1/4 bcx +  
PROGRESS NOTE      Patient Presents with/Seen in Consultation For      Reason for Consult: Recent ERCP     CHIEF COMPLAINT: Chills and fatigue   Subjective:     Patient seen sitting in bed no apparent distress.  No abdominal pain, nausea or vomiting.  Had bowel movement denies melena hematochezia.  Review of Systems  Aside from what was mentioned in the PMH and HPI, essentially unremarkable, all others negative.    Objective:     /66   Pulse 56   Temp 98.2 °F (36.8 °C) (Oral)   Resp 18   Ht 1.626 m (5' 4\")   Wt 65.8 kg (145 lb)   SpO2 97%   BMI 24.89 kg/m²     General appearance: alert, awake, laying in bed, and cooperative  Eyes: conjunctiva pale, sclera anicteric. PERRL.  Lungs: clear to auscultation bilaterally  Heart: regular rate and rhythm, no murmur, 2+ pulses; no edema  Abdomen: soft, non-tender; bowel sounds normal; no masses,  no organomegaly  Extremities: extremities without edema  Pulses: 2+ and symmetric  Skin: Skin color, texture, turgor normal.   Neurologic: Grossly normal    simethicone (MYLICON) chewable tablet 80 mg, Q6H PRN  docusate sodium (COLACE) capsule 100 mg, BID  magnesium hydroxide (MILK OF MAGNESIA) 400 MG/5ML suspension 30 mL, Daily PRN  amLODIPine (NORVASC) tablet 5 mg, Daily  magnesium oxide (MAG-OX) tablet 400 mg, Daily  polyethylene glycol (GLYCOLAX) packet 17 g, Daily PRN  sodium chloride flush 0.9 % injection 5-40 mL, 2 times per day  sodium chloride flush 0.9 % injection 5-40 mL, PRN  0.9 % sodium chloride infusion, PRN  enoxaparin (LOVENOX) injection 40 mg, Daily  acetaminophen (TYLENOL) tablet 650 mg, Q6H PRN   Or  acetaminophen (TYLENOL) suppository 650 mg, Q6H PRN  0.9 % sodium chloride infusion, Continuous  meropenem (MERREM) 1,000 mg in sodium chloride 0.9 % 100 mL IVPB, Q8H         Data Review  CBC:   Lab Results   Component Value Date/Time    WBC 11.7 07/05/2025 04:18 AM    RBC 3.65 07/05/2025 04:18 AM    HGB 11.9 07/05/2025 04:18 AM    HCT 36.6 07/05/2025 
Results   Component Value Date/Time    WBC 9.3 07/06/2025 03:50 AM    RBC 3.78 07/06/2025 03:50 AM    HGB 12.2 07/06/2025 03:50 AM    HCT 37.2 07/06/2025 03:50 AM    MCV 98.4 07/06/2025 03:50 AM    MCH 32.3 07/06/2025 03:50 AM    MCHC 32.8 07/06/2025 03:50 AM    RDW 12.4 07/06/2025 03:50 AM     07/06/2025 03:50 AM    MPV 9.9 07/06/2025 03:50 AM     CMP:    Lab Results   Component Value Date/Time     07/06/2025 03:50 AM    K 4.4 07/06/2025 03:50 AM    K 4.0 10/04/2022 09:15 AM     07/06/2025 03:50 AM    CO2 24 07/06/2025 03:50 AM    BUN 13 07/06/2025 03:50 AM    CREATININE 0.6 07/06/2025 03:50 AM    GFRAA >60 10/13/2022 05:02 AM    LABGLOM >90 07/06/2025 03:50 AM    LABGLOM >60 11/21/2023 11:56 AM    GLUCOSE 124 07/06/2025 03:50 AM    GLUCOSE 113 09/25/2020 07:54 AM    CALCIUM 9.0 07/06/2025 03:50 AM    BILITOT 0.6 07/06/2025 03:50 AM    ALKPHOS 226 07/06/2025 03:50 AM    AST 26 07/06/2025 03:50 AM     07/06/2025 03:50 AM     Hepatic Function Panel:    Lab Results   Component Value Date/Time    ALKPHOS 226 07/06/2025 03:50 AM     07/06/2025 03:50 AM    AST 26 07/06/2025 03:50 AM    BILITOT 0.6 07/06/2025 03:50 AM    BILIDIR <0.1 06/24/2025 08:15 PM    IBILI Can not be calculated 06/24/2025 08:15 PM     No components found for: \"CHLPL\"    Lab Results   Component Value Date    TRIG 134 06/05/2020    TRIG 189 (H) 12/10/2019    TRIG 121 06/10/2019       Lab Results   Component Value Date    HDL 45 06/05/2020    HDL 44 12/10/2019    HDL 48 06/10/2019     No components found for: \"LDLCALC\"  No components found for: \"LABVLDL\"   PT/INR:    Lab Results   Component Value Date/Time    PROTIME 11.3 05/19/2025 11:56 AM    INR 1.1 05/19/2025 11:56 AM     IRON:  No results found for: \"IRON\"  Iron Saturation:  No components found for: \"PERCENTFE\"  FERRITIN:  No results found for: \"FERRITIN\"      Assessment:     Probable cholangitis/sequela from remote cholangioscopy  S/p ERCP with spyglass for 
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 Every effort has been made to ensure accuracy, however; inadvertent computerized transcription errors may be present.     Discussed with Dr. Garnett  Plan per Dr. Tamir Doll APRN-NP-C 7/6/2025  11:02 AM For Dr. Garnett    GI attending addendum:    The patient case was reviewed and discussed with the GI midlevel team.       Yared Garnett DO

## 2025-07-30 ENCOUNTER — APPOINTMENT (OUTPATIENT)
Dept: CT IMAGING | Age: 80
End: 2025-07-30
Payer: MEDICARE

## 2025-07-30 ENCOUNTER — HOSPITAL ENCOUNTER (INPATIENT)
Age: 80
LOS: 3 days | Discharge: ANOTHER HOSPITAL | End: 2025-08-02
Admitting: INTERNAL MEDICINE
Payer: MEDICARE

## 2025-07-30 ENCOUNTER — TELEPHONE (OUTPATIENT)
Age: 80
End: 2025-07-30

## 2025-07-30 ENCOUNTER — APPOINTMENT (OUTPATIENT)
Dept: GENERAL RADIOLOGY | Age: 80
End: 2025-07-30
Payer: MEDICARE

## 2025-07-30 DIAGNOSIS — A41.9 SEPSIS, DUE TO UNSPECIFIED ORGANISM, UNSPECIFIED WHETHER ACUTE ORGAN DYSFUNCTION PRESENT (HCC): ICD-10-CM

## 2025-07-30 DIAGNOSIS — E80.6 HYPERBILIRUBINEMIA: ICD-10-CM

## 2025-07-30 DIAGNOSIS — K83.09 CHOLANGITIS (HCC): Primary | ICD-10-CM

## 2025-07-30 LAB
ALBUMIN SERPL-MCNC: 4.3 G/DL (ref 3.5–5.2)
ALP SERPL-CCNC: 200 U/L (ref 40–129)
ALT SERPL-CCNC: 225 U/L (ref 0–50)
ANION GAP SERPL CALCULATED.3IONS-SCNC: 12 MMOL/L (ref 7–16)
AST SERPL-CCNC: 230 U/L (ref 0–50)
B PARAP IS1001 DNA NPH QL NAA+NON-PROBE: NOT DETECTED
B PERT DNA SPEC QL NAA+PROBE: NOT DETECTED
BASOPHILS # BLD: 0 K/UL (ref 0–0.2)
BASOPHILS NFR BLD: 0 % (ref 0–2)
BILIRUB DIRECT SERPL-MCNC: 2.2 MG/DL (ref 0–0.2)
BILIRUB INDIRECT SERPL-MCNC: 1 MG/DL (ref 0–1)
BILIRUB SERPL-MCNC: 3.2 MG/DL (ref 0–1.2)
BILIRUB SERPL-MCNC: 3.3 MG/DL (ref 0–1.2)
BILIRUB UR QL STRIP: ABNORMAL
BUN SERPL-MCNC: 17 MG/DL (ref 8–23)
C PNEUM DNA NPH QL NAA+NON-PROBE: NOT DETECTED
CALCIUM SERPL-MCNC: 9.5 MG/DL (ref 8.8–10.2)
CHLORIDE SERPL-SCNC: 102 MMOL/L (ref 98–107)
CLARITY UR: CLEAR
CO2 SERPL-SCNC: 22 MMOL/L (ref 22–29)
COLOR UR: YELLOW
COMMENT: ABNORMAL
CREAT SERPL-MCNC: 0.9 MG/DL (ref 0.7–1.2)
EOSINOPHIL # BLD: 0 K/UL (ref 0.05–0.5)
EOSINOPHILS RELATIVE PERCENT: 0 % (ref 0–6)
ERYTHROCYTE [DISTWIDTH] IN BLOOD BY AUTOMATED COUNT: 12.9 % (ref 11.5–15)
FLUAV RNA NPH QL NAA+NON-PROBE: NOT DETECTED
FLUBV RNA NPH QL NAA+NON-PROBE: NOT DETECTED
GFR, ESTIMATED: 89 ML/MIN/1.73M2
GLUCOSE SERPL-MCNC: 149 MG/DL (ref 74–99)
GLUCOSE UR STRIP-MCNC: NEGATIVE MG/DL
HADV DNA NPH QL NAA+NON-PROBE: NOT DETECTED
HCOV 229E RNA NPH QL NAA+NON-PROBE: NOT DETECTED
HCOV HKU1 RNA NPH QL NAA+NON-PROBE: NOT DETECTED
HCOV NL63 RNA NPH QL NAA+NON-PROBE: NOT DETECTED
HCOV OC43 RNA NPH QL NAA+NON-PROBE: NOT DETECTED
HCT VFR BLD AUTO: 40.9 % (ref 37–54)
HGB BLD-MCNC: 13.9 G/DL (ref 12.5–16.5)
HGB UR QL STRIP.AUTO: NEGATIVE
HMPV RNA NPH QL NAA+NON-PROBE: NOT DETECTED
HPIV1 RNA NPH QL NAA+NON-PROBE: NOT DETECTED
HPIV2 RNA NPH QL NAA+NON-PROBE: NOT DETECTED
HPIV3 RNA NPH QL NAA+NON-PROBE: NOT DETECTED
HPIV4 RNA NPH QL NAA+NON-PROBE: NOT DETECTED
KETONES UR STRIP-MCNC: NEGATIVE MG/DL
LACTATE BLDV-SCNC: 1.5 MMOL/L (ref 0.5–1.9)
LEUKOCYTE ESTERASE UR QL STRIP: NEGATIVE
LYMPHOCYTES NFR BLD: 0.16 K/UL (ref 1.5–4)
LYMPHOCYTES RELATIVE PERCENT: 2 % (ref 20–42)
M PNEUMO DNA NPH QL NAA+NON-PROBE: NOT DETECTED
MCH RBC QN AUTO: 32.6 PG (ref 26–35)
MCHC RBC AUTO-ENTMCNC: 34 G/DL (ref 32–34.5)
MCV RBC AUTO: 95.8 FL (ref 80–99.9)
MONOCYTES NFR BLD: 0.4 K/UL (ref 0.1–0.95)
MONOCYTES NFR BLD: 4 % (ref 2–12)
NEUTROPHILS NFR BLD: 94 % (ref 43–80)
NEUTS SEG NFR BLD: 8.55 K/UL (ref 1.8–7.3)
NITRITE UR QL STRIP: NEGATIVE
PH UR STRIP: 7 [PH] (ref 5–8)
PLATELET # BLD AUTO: 230 K/UL (ref 130–450)
PMV BLD AUTO: 9.9 FL (ref 7–12)
POTASSIUM SERPL-SCNC: 4.3 MMOL/L (ref 3.5–5.1)
PROT SERPL-MCNC: 7.3 G/DL (ref 6.4–8.3)
PROT UR STRIP-MCNC: NEGATIVE MG/DL
RBC # BLD AUTO: 4.27 M/UL (ref 3.8–5.8)
RBC # BLD: ABNORMAL 10*6/UL
RSV RNA NPH QL NAA+NON-PROBE: NOT DETECTED
RV+EV RNA NPH QL NAA+NON-PROBE: NOT DETECTED
SARS-COV-2 RNA NPH QL NAA+NON-PROBE: NOT DETECTED
SODIUM SERPL-SCNC: 136 MMOL/L (ref 136–145)
SP GR UR STRIP: 1.01 (ref 1–1.03)
SPECIMEN DESCRIPTION: NORMAL
UROBILINOGEN UR STRIP-ACNC: 0.2 EU/DL (ref 0–1)
WBC OTHER # BLD: 9.1 K/UL (ref 4.5–11.5)

## 2025-07-30 PROCEDURE — 6370000000 HC RX 637 (ALT 250 FOR IP)

## 2025-07-30 PROCEDURE — 71045 X-RAY EXAM CHEST 1 VIEW: CPT

## 2025-07-30 PROCEDURE — 85025 COMPLETE CBC W/AUTO DIFF WBC: CPT

## 2025-07-30 PROCEDURE — 83605 ASSAY OF LACTIC ACID: CPT

## 2025-07-30 PROCEDURE — 83735 ASSAY OF MAGNESIUM: CPT

## 2025-07-30 PROCEDURE — 80053 COMPREHEN METABOLIC PANEL: CPT

## 2025-07-30 PROCEDURE — 93005 ELECTROCARDIOGRAM TRACING: CPT

## 2025-07-30 PROCEDURE — 81003 URINALYSIS AUTO W/O SCOPE: CPT

## 2025-07-30 PROCEDURE — 1200000000 HC SEMI PRIVATE

## 2025-07-30 PROCEDURE — 2500000003 HC RX 250 WO HCPCS

## 2025-07-30 PROCEDURE — 6360000002 HC RX W HCPCS

## 2025-07-30 PROCEDURE — 99285 EMERGENCY DEPT VISIT HI MDM: CPT

## 2025-07-30 PROCEDURE — 2580000003 HC RX 258

## 2025-07-30 PROCEDURE — 0202U NFCT DS 22 TRGT SARS-COV-2: CPT

## 2025-07-30 PROCEDURE — 74177 CT ABD & PELVIS W/CONTRAST: CPT

## 2025-07-30 PROCEDURE — 87040 BLOOD CULTURE FOR BACTERIA: CPT

## 2025-07-30 PROCEDURE — 6360000004 HC RX CONTRAST MEDICATION: Performed by: RADIOLOGY

## 2025-07-30 PROCEDURE — 82248 BILIRUBIN DIRECT: CPT

## 2025-07-30 RX ORDER — 0.9 % SODIUM CHLORIDE 0.9 %
30 INTRAVENOUS SOLUTION INTRAVENOUS ONCE
Status: COMPLETED | OUTPATIENT
Start: 2025-07-30 | End: 2025-07-30

## 2025-07-30 RX ORDER — IOPAMIDOL 755 MG/ML
75 INJECTION, SOLUTION INTRAVASCULAR
Status: COMPLETED | OUTPATIENT
Start: 2025-07-30 | End: 2025-07-30

## 2025-07-30 RX ORDER — ACETAMINOPHEN 500 MG
1000 TABLET ORAL ONCE
Status: COMPLETED | OUTPATIENT
Start: 2025-07-30 | End: 2025-07-30

## 2025-07-30 RX ORDER — METRONIDAZOLE 500 MG/100ML
500 INJECTION, SOLUTION INTRAVENOUS ONCE
Status: COMPLETED | OUTPATIENT
Start: 2025-07-30 | End: 2025-07-30

## 2025-07-30 RX ADMIN — ACETAMINOPHEN 1000 MG: 500 TABLET ORAL at 17:53

## 2025-07-30 RX ADMIN — WATER 2000 MG: 1 INJECTION INTRAMUSCULAR; INTRAVENOUS; SUBCUTANEOUS at 21:56

## 2025-07-30 RX ADMIN — IOPAMIDOL 75 ML: 755 INJECTION, SOLUTION INTRAVENOUS at 18:40

## 2025-07-30 RX ADMIN — METRONIDAZOLE 500 MG: 500 INJECTION, SOLUTION INTRAVENOUS at 21:57

## 2025-07-30 RX ADMIN — SODIUM CHLORIDE 2040 ML: 0.9 INJECTION, SOLUTION INTRAVENOUS at 17:53

## 2025-07-30 ASSESSMENT — PAIN SCALES - GENERAL: PAINLEVEL_OUTOF10: 0

## 2025-07-30 ASSESSMENT — PAIN - FUNCTIONAL ASSESSMENT: PAIN_FUNCTIONAL_ASSESSMENT: NONE - DENIES PAIN

## 2025-07-30 NOTE — TELEPHONE ENCOUNTER
The patient is scheduled for his new patient appointment on 08/15/25 [facility] at 8:30 am. Directions to the office were given to the patient at the time of our call. The patient was instructed to bring his photo id and insurance card. The patient confirmed all of the above and at this time all questions were answered  Electronically signed by Jaelyn Akbar MA on 7/30/2025 at 1:14 PM

## 2025-07-31 ENCOUNTER — APPOINTMENT (OUTPATIENT)
Dept: CT IMAGING | Age: 80
End: 2025-07-31
Payer: MEDICARE

## 2025-07-31 PROBLEM — C24.0 HILAR CHOLANGIOCARCINOMA (HCC): Status: ACTIVE | Noted: 2025-07-31

## 2025-07-31 PROBLEM — K83.1 BILIARY STRICTURE (HCC): Status: ACTIVE | Noted: 2025-07-31

## 2025-07-31 PROBLEM — R74.01 TRANSAMINITIS: Status: ACTIVE | Noted: 2025-07-31

## 2025-07-31 LAB
ALBUMIN SERPL-MCNC: 3.5 G/DL (ref 3.5–5.2)
ALP SERPL-CCNC: 162 U/L (ref 40–129)
ALT SERPL-CCNC: 172 U/L (ref 0–50)
ANION GAP SERPL CALCULATED.3IONS-SCNC: 11 MMOL/L (ref 7–16)
AST SERPL-CCNC: 125 U/L (ref 0–50)
BASOPHILS # BLD: 0.02 K/UL (ref 0–0.2)
BASOPHILS NFR BLD: 0 % (ref 0–2)
BILIRUB SERPL-MCNC: 1.6 MG/DL (ref 0–1.2)
BUN SERPL-MCNC: 12 MG/DL (ref 8–23)
CALCIUM SERPL-MCNC: 8.6 MG/DL (ref 8.8–10.2)
CHLORIDE SERPL-SCNC: 107 MMOL/L (ref 98–107)
CO2 SERPL-SCNC: 21 MMOL/L (ref 22–29)
CREAT SERPL-MCNC: 0.7 MG/DL (ref 0.7–1.2)
EOSINOPHIL # BLD: 0.13 K/UL (ref 0.05–0.5)
EOSINOPHILS RELATIVE PERCENT: 2 % (ref 0–6)
ERYTHROCYTE [DISTWIDTH] IN BLOOD BY AUTOMATED COUNT: 13 % (ref 11.5–15)
GFR, ESTIMATED: >90 ML/MIN/1.73M2
GLUCOSE SERPL-MCNC: 136 MG/DL (ref 74–99)
HCT VFR BLD AUTO: 37.9 % (ref 37–54)
HGB BLD-MCNC: 12.2 G/DL (ref 12.5–16.5)
IMM GRANULOCYTES # BLD AUTO: 0.04 K/UL (ref 0–0.58)
IMM GRANULOCYTES NFR BLD: 1 % (ref 0–5)
INR PPP: 1.2
LYMPHOCYTES NFR BLD: 0.67 K/UL (ref 1.5–4)
LYMPHOCYTES RELATIVE PERCENT: 8 % (ref 20–42)
MAGNESIUM SERPL-MCNC: 2 MG/DL (ref 1.6–2.4)
MCH RBC QN AUTO: 31.9 PG (ref 26–35)
MCHC RBC AUTO-ENTMCNC: 32.2 G/DL (ref 32–34.5)
MCV RBC AUTO: 99.2 FL (ref 80–99.9)
MONOCYTES NFR BLD: 1.04 K/UL (ref 0.1–0.95)
MONOCYTES NFR BLD: 12 % (ref 2–12)
NEUTROPHILS NFR BLD: 77 % (ref 43–80)
NEUTS SEG NFR BLD: 6.47 K/UL (ref 1.8–7.3)
PLATELET # BLD AUTO: 204 K/UL (ref 130–450)
PMV BLD AUTO: 10.1 FL (ref 7–12)
POTASSIUM SERPL-SCNC: 3.8 MMOL/L (ref 3.5–5.1)
PROT SERPL-MCNC: 5.9 G/DL (ref 6.4–8.3)
PROTHROMBIN TIME: 12.8 SEC (ref 9.3–12.4)
RBC # BLD AUTO: 3.82 M/UL (ref 3.8–5.8)
SODIUM SERPL-SCNC: 139 MMOL/L (ref 136–145)
WBC OTHER # BLD: 8.4 K/UL (ref 4.5–11.5)

## 2025-07-31 PROCEDURE — 74175 CTA ABDOMEN W/CONTRAST: CPT

## 2025-07-31 PROCEDURE — 85610 PROTHROMBIN TIME: CPT

## 2025-07-31 PROCEDURE — 6360000002 HC RX W HCPCS: Performed by: NURSE PRACTITIONER

## 2025-07-31 PROCEDURE — 1200000000 HC SEMI PRIVATE

## 2025-07-31 PROCEDURE — 71260 CT THORAX DX C+: CPT

## 2025-07-31 PROCEDURE — 85025 COMPLETE CBC W/AUTO DIFF WBC: CPT

## 2025-07-31 PROCEDURE — 6370000000 HC RX 637 (ALT 250 FOR IP): Performed by: NURSE PRACTITIONER

## 2025-07-31 PROCEDURE — 80053 COMPREHEN METABOLIC PANEL: CPT

## 2025-07-31 PROCEDURE — 6360000004 HC RX CONTRAST MEDICATION: Performed by: RADIOLOGY

## 2025-07-31 PROCEDURE — 2580000003 HC RX 258: Performed by: NURSE PRACTITIONER

## 2025-07-31 PROCEDURE — 36415 COLL VENOUS BLD VENIPUNCTURE: CPT

## 2025-07-31 PROCEDURE — 99222 1ST HOSP IP/OBS MODERATE 55: CPT | Performed by: TRANSPLANT SURGERY

## 2025-07-31 PROCEDURE — 2500000003 HC RX 250 WO HCPCS: Performed by: NURSE PRACTITIONER

## 2025-07-31 PROCEDURE — 6360000002 HC RX W HCPCS: Performed by: INTERNAL MEDICINE

## 2025-07-31 RX ORDER — SODIUM CHLORIDE 9 MG/ML
INJECTION, SOLUTION INTRAVENOUS PRN
Status: CANCELLED | OUTPATIENT
Start: 2025-07-31

## 2025-07-31 RX ORDER — CHLORAL HYDRATE 500 MG
1 CAPSULE ORAL DAILY
Status: CANCELLED | OUTPATIENT
Start: 2025-08-01

## 2025-07-31 RX ORDER — MULTIVITAMIN WITH IRON
1 TABLET ORAL DAILY
Status: DISCONTINUED | OUTPATIENT
Start: 2025-07-31 | End: 2025-08-02 | Stop reason: HOSPADM

## 2025-07-31 RX ORDER — SODIUM CHLORIDE 9 MG/ML
INJECTION, SOLUTION INTRAVENOUS PRN
Status: DISCONTINUED | OUTPATIENT
Start: 2025-07-31 | End: 2025-08-02 | Stop reason: HOSPADM

## 2025-07-31 RX ORDER — VITAMIN B COMPLEX
1 CAPSULE ORAL DAILY
Status: CANCELLED | OUTPATIENT
Start: 2025-08-01

## 2025-07-31 RX ORDER — ONDANSETRON 4 MG/1
4 TABLET, ORALLY DISINTEGRATING ORAL EVERY 8 HOURS PRN
Status: CANCELLED | OUTPATIENT
Start: 2025-07-31

## 2025-07-31 RX ORDER — BISACODYL 5 MG/1
5 TABLET, DELAYED RELEASE ORAL DAILY PRN
Status: CANCELLED | OUTPATIENT
Start: 2025-07-31

## 2025-07-31 RX ORDER — POTASSIUM CHLORIDE 1500 MG/1
40 TABLET, EXTENDED RELEASE ORAL PRN
Status: ACTIVE | OUTPATIENT
Start: 2025-07-31 | End: 2025-08-02

## 2025-07-31 RX ORDER — METRONIDAZOLE 500 MG/100ML
500 INJECTION, SOLUTION INTRAVENOUS EVERY 8 HOURS
Status: CANCELLED | OUTPATIENT
Start: 2025-08-01 | End: 2025-08-06

## 2025-07-31 RX ORDER — ONDANSETRON 4 MG/1
4 TABLET, ORALLY DISINTEGRATING ORAL EVERY 8 HOURS PRN
Status: DISCONTINUED | OUTPATIENT
Start: 2025-07-31 | End: 2025-08-02 | Stop reason: SDUPTHER

## 2025-07-31 RX ORDER — SODIUM CHLORIDE 0.9 % (FLUSH) 0.9 %
5-40 SYRINGE (ML) INJECTION EVERY 12 HOURS SCHEDULED
Status: DISCONTINUED | OUTPATIENT
Start: 2025-07-31 | End: 2025-08-02 | Stop reason: HOSPADM

## 2025-07-31 RX ORDER — MAGNESIUM OXIDE 400 MG/1
400 TABLET ORAL DAILY
Status: CANCELLED | OUTPATIENT
Start: 2025-08-01

## 2025-07-31 RX ORDER — POTASSIUM CHLORIDE 750 MG/1
5 TABLET, EXTENDED RELEASE ORAL NIGHTLY
Status: DISCONTINUED | OUTPATIENT
Start: 2025-07-31 | End: 2025-08-02 | Stop reason: HOSPADM

## 2025-07-31 RX ORDER — URSODIOL 300 MG/1
300 CAPSULE ORAL 2 TIMES DAILY
Status: CANCELLED | OUTPATIENT
Start: 2025-08-01

## 2025-07-31 RX ORDER — ONDANSETRON 2 MG/ML
4 INJECTION INTRAMUSCULAR; INTRAVENOUS EVERY 6 HOURS PRN
Status: CANCELLED | OUTPATIENT
Start: 2025-07-31

## 2025-07-31 RX ORDER — SODIUM CHLORIDE 9 MG/ML
INJECTION, SOLUTION INTRAVENOUS CONTINUOUS
Status: DISCONTINUED | OUTPATIENT
Start: 2025-07-31 | End: 2025-08-01

## 2025-07-31 RX ORDER — METRONIDAZOLE 500 MG/100ML
500 INJECTION, SOLUTION INTRAVENOUS EVERY 8 HOURS
Status: DISCONTINUED | OUTPATIENT
Start: 2025-07-31 | End: 2025-08-02 | Stop reason: HOSPADM

## 2025-07-31 RX ORDER — SODIUM CHLORIDE 9 MG/ML
INJECTION, SOLUTION INTRAVENOUS CONTINUOUS
Status: CANCELLED | OUTPATIENT
Start: 2025-07-31

## 2025-07-31 RX ORDER — POTASSIUM CHLORIDE 1500 MG/1
40 TABLET, EXTENDED RELEASE ORAL PRN
Status: CANCELLED | OUTPATIENT
Start: 2025-07-31 | End: 2025-08-03

## 2025-07-31 RX ORDER — ENOXAPARIN SODIUM 100 MG/ML
40 INJECTION SUBCUTANEOUS ONCE
Status: COMPLETED | OUTPATIENT
Start: 2025-07-31 | End: 2025-07-31

## 2025-07-31 RX ORDER — URSODIOL 300 MG/1
300 CAPSULE ORAL 2 TIMES DAILY
Status: DISCONTINUED | OUTPATIENT
Start: 2025-07-31 | End: 2025-08-02 | Stop reason: HOSPADM

## 2025-07-31 RX ORDER — AMLODIPINE BESYLATE 5 MG/1
5 TABLET ORAL DAILY
Status: CANCELLED | OUTPATIENT
Start: 2025-08-01

## 2025-07-31 RX ORDER — CHLORAL HYDRATE 500 MG
1 CAPSULE ORAL DAILY
Status: DISCONTINUED | OUTPATIENT
Start: 2025-07-31 | End: 2025-07-31 | Stop reason: CLARIF

## 2025-07-31 RX ORDER — IOPAMIDOL 755 MG/ML
140 INJECTION, SOLUTION INTRAVASCULAR
Status: COMPLETED | OUTPATIENT
Start: 2025-07-31 | End: 2025-07-31

## 2025-07-31 RX ORDER — SODIUM CHLORIDE 0.9 % (FLUSH) 0.9 %
5-40 SYRINGE (ML) INJECTION PRN
Status: CANCELLED | OUTPATIENT
Start: 2025-07-31

## 2025-07-31 RX ORDER — ASPIRIN 81 MG/1
81 TABLET, CHEWABLE ORAL DAILY
Status: DISCONTINUED | OUTPATIENT
Start: 2025-07-31 | End: 2025-08-02 | Stop reason: HOSPADM

## 2025-07-31 RX ORDER — LANOLIN ALCOHOL/MO/W.PET/CERES
400 CREAM (GRAM) TOPICAL DAILY
Status: DISCONTINUED | OUTPATIENT
Start: 2025-07-31 | End: 2025-08-02 | Stop reason: HOSPADM

## 2025-07-31 RX ORDER — AMLODIPINE BESYLATE 5 MG/1
5 TABLET ORAL DAILY
Status: DISCONTINUED | OUTPATIENT
Start: 2025-07-31 | End: 2025-08-02 | Stop reason: HOSPADM

## 2025-07-31 RX ORDER — MAGNESIUM SULFATE IN WATER 40 MG/ML
2000 INJECTION, SOLUTION INTRAVENOUS PRN
Status: CANCELLED | OUTPATIENT
Start: 2025-07-31

## 2025-07-31 RX ORDER — ONDANSETRON 2 MG/ML
4 INJECTION INTRAMUSCULAR; INTRAVENOUS EVERY 6 HOURS PRN
Status: DISCONTINUED | OUTPATIENT
Start: 2025-07-31 | End: 2025-08-02 | Stop reason: SDUPTHER

## 2025-07-31 RX ORDER — POTASSIUM CHLORIDE 7.45 MG/ML
10 INJECTION INTRAVENOUS PRN
Status: ACTIVE | OUTPATIENT
Start: 2025-07-31 | End: 2025-08-02

## 2025-07-31 RX ORDER — SODIUM CHLORIDE 0.9 % (FLUSH) 0.9 %
5-40 SYRINGE (ML) INJECTION PRN
Status: DISCONTINUED | OUTPATIENT
Start: 2025-07-31 | End: 2025-08-02 | Stop reason: HOSPADM

## 2025-07-31 RX ORDER — BISACODYL 5 MG/1
5 TABLET, DELAYED RELEASE ORAL DAILY PRN
Status: DISCONTINUED | OUTPATIENT
Start: 2025-07-31 | End: 2025-08-02 | Stop reason: SDUPTHER

## 2025-07-31 RX ORDER — MAGNESIUM SULFATE IN WATER 40 MG/ML
2000 INJECTION, SOLUTION INTRAVENOUS PRN
Status: DISCONTINUED | OUTPATIENT
Start: 2025-07-31 | End: 2025-08-02 | Stop reason: SDUPTHER

## 2025-07-31 RX ORDER — SODIUM CHLORIDE 0.9 % (FLUSH) 0.9 %
5-40 SYRINGE (ML) INJECTION EVERY 12 HOURS SCHEDULED
Status: CANCELLED | OUTPATIENT
Start: 2025-07-31

## 2025-07-31 RX ORDER — POTASSIUM CHLORIDE 7.45 MG/ML
10 INJECTION INTRAVENOUS PRN
Status: CANCELLED | OUTPATIENT
Start: 2025-07-31 | End: 2025-08-03

## 2025-07-31 RX ADMIN — METRONIDAZOLE 500 MG: 500 INJECTION, SOLUTION INTRAVENOUS at 05:52

## 2025-07-31 RX ADMIN — SODIUM CHLORIDE: 0.9 INJECTION, SOLUTION INTRAVENOUS at 01:44

## 2025-07-31 RX ADMIN — AMLODIPINE BESYLATE 5 MG: 5 TABLET ORAL at 08:24

## 2025-07-31 RX ADMIN — MAGNESIUM OXIDE 400 MG (241.3 MG MAGNESIUM) TABLET 400 MG: TABLET at 08:24

## 2025-07-31 RX ADMIN — URSODIOL 300 MG: 300 CAPSULE ORAL at 21:22

## 2025-07-31 RX ADMIN — POTASSIUM CHLORIDE 5 MEQ: 750 TABLET, EXTENDED RELEASE ORAL at 21:21

## 2025-07-31 RX ADMIN — WATER 1000 MG: 1 INJECTION INTRAMUSCULAR; INTRAVENOUS; SUBCUTANEOUS at 21:21

## 2025-07-31 RX ADMIN — METRONIDAZOLE 500 MG: 500 INJECTION, SOLUTION INTRAVENOUS at 14:28

## 2025-07-31 RX ADMIN — METRONIDAZOLE 500 MG: 500 INJECTION, SOLUTION INTRAVENOUS at 21:29

## 2025-07-31 RX ADMIN — ENOXAPARIN SODIUM 40 MG: 100 INJECTION SUBCUTANEOUS at 17:42

## 2025-07-31 RX ADMIN — Medication 1 TABLET: at 08:24

## 2025-07-31 RX ADMIN — IOPAMIDOL 140 ML: 755 INJECTION, SOLUTION INTRAVENOUS at 16:19

## 2025-07-31 RX ADMIN — URSODIOL 300 MG: 300 CAPSULE ORAL at 10:54

## 2025-07-31 RX ADMIN — SODIUM CHLORIDE: 0.9 INJECTION, SOLUTION INTRAVENOUS at 14:28

## 2025-07-31 ASSESSMENT — ENCOUNTER SYMPTOMS
BACK PAIN: 0
BLOOD IN STOOL: 0
SHORTNESS OF BREATH: 0
DIARRHEA: 0
ABDOMINAL PAIN: 0
CONSTIPATION: 0
COUGH: 0
VOMITING: 0
EYES NEGATIVE: 1
NAUSEA: 0
EYE PAIN: 0
EYE DISCHARGE: 0
PHOTOPHOBIA: 0

## 2025-07-31 ASSESSMENT — PAIN SCALES - GENERAL: PAINLEVEL_OUTOF10: 0

## 2025-07-31 NOTE — CONSULTS
(See Comments)       Family History   Problem Relation Age of Onset    Cancer Mother         breast    Dementia Father     Heart Disease Father        Social History     Socioeconomic History    Marital status:      Spouse name: Flora    Number of children: 2    Years of education: 12    Highest education level: High school graduate   Occupational History    Not on file   Tobacco Use    Smoking status: Never    Smokeless tobacco: Never   Vaping Use    Vaping status: Never Used   Substance and Sexual Activity    Alcohol use: Yes     Comment: 1 a month    Drug use: No    Sexual activity: Not on file   Other Topics Concern    Not on file   Social History Narrative    Not on file     Social Drivers of Health     Financial Resource Strain: Unknown (12/10/2019)    Overall Financial Resource Strain (CARDIA)     Difficulty of Paying Living Expenses: Patient declined   Food Insecurity: No Food Insecurity (7/31/2025)    Hunger Vital Sign     Worried About Running Out of Food in the Last Year: Never true     Ran Out of Food in the Last Year: Never true   Transportation Needs: No Transportation Needs (7/31/2025)    PRAPARE - Transportation     Lack of Transportation (Medical): No     Lack of Transportation (Non-Medical): No   Physical Activity: Not on file   Stress: Not on file   Social Connections: Not on file   Intimate Partner Violence: Not on file   Housing Stability: Low Risk  (7/31/2025)    Housing Stability Vital Sign     Unable to Pay for Housing in the Last Year: No     Number of Times Moved in the Last Year: 0     Homeless in the Last Year: No       ROS:   Review of Systems   Constitutional:  Negative for chills, diaphoresis and fever.   HENT:  Negative for congestion, ear discharge, ear pain, hearing loss, nosebleeds and tinnitus.    Eyes:  Negative for photophobia, pain and discharge.   Respiratory:  Negative for shortness of breath.    Cardiovascular:  Negative for palpitations and leg swelling.    Gastrointestinal:  Negative for abdominal pain, blood in stool, constipation, diarrhea, nausea and vomiting.   Endocrine: Negative for polydipsia.   Genitourinary:  Negative for frequency, hematuria and urgency.   Musculoskeletal:  Negative for back pain and neck pain.   Skin:  Negative for rash.   Allergic/Immunologic: Negative for environmental allergies.   Neurological:  Negative for tremors and seizures.   Psychiatric/Behavioral:  Negative for hallucinations and suicidal ideas. The patient is not nervous/anxious.        Physical Exam:  /67   Pulse 56   Temp 98.8 °F (37.1 °C) (Oral)   Resp 18   Ht 1.626 m (5' 4\")   Wt 68 kg (150 lb)   SpO2 95%   BMI 25.75 kg/m²     PSYCH: mood and affect normal, alert and oriented x 3: No apparent distress, comfortable  EYES: Sclera white, pupils equal round and reactive to light  ENMT:  Hearing normal, trachea midline, ears externally intact  LYMPH: no obvious lympadenopathy in neck.   RESP: Respiratory effort was normal with no retractions or use of accessory muscles.  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  We also reviewed his images together today.  - Will order a triphasic CT scan as well as a CT scan of his chest.  Given the way the initial CT scan looks he likely has arterial involvement.  - Discussed with the patient the need for tissue diagnosis as he would be looking at chemotherapy and immunotherapy  - I also discussed that best case scenario of this is primary sclerosing cholangitis however he does not have a history of any autoimmune disorders.  - Discussed the case with Dr. Garnett for repeat spyglass with an endoscopic ultrasound

## 2025-07-31 NOTE — ACP (ADVANCE CARE PLANNING)
Advance Care Planning   Healthcare Decision Maker:    Primary Decision Maker: CarrollFlora Faith [facility] - [phone]    Click here to complete Healthcare Decision Makers including selection of the Healthcare Decision Maker Relationship (ie \"Primary\").

## 2025-07-31 NOTE — ED NOTES
ED to Inpatient Handoff Report    Notified 5s that electronic handoff available and patient ready for transport to room 0539.    Safety Risks: None identified    Patient in Restraints: no    Constant Observer or Patient : no    Telemetry Monitoring Ordered :No           Order to transfer to unit without monitor:N/A    Last MEWS: 1 Time completed: 2158    Deterioration Index Score:   Predictive Model Details          23 (Normal)  Factor Value    Calculated 7/30/2025 22:09 60% Age 80 years old    Deterioration Index Model 11% Respiratory rate 18     7% Potassium 4.3 mmol/L     6% Pulse oximetry 100 %     5% Sodium 136 mmol/L     5% Systolic 131     3% Pulse 91     2% WBC count 9.1 k/uL     0% Temperature 98.7 °F (37.1 °C)     0% Hematocrit 40.9 %        Vitals:    07/30/25 1646 07/30/25 1901 07/30/25 2158   BP: (!) 144/70 (!) 128/49 131/69   Pulse: 86 89 91   Resp: 18 17 18   Temp: (!) 102.2 °F (39 °C) 99.8 °F (37.7 °C) 98.7 °F (37.1 °C)   TempSrc: Oral Oral Oral   SpO2: 97% 99% 100%   Weight: 68 kg (150 lb)     Height: 1.626 m (5' 4\")           Opportunity for questions and clarification was provided.

## 2025-07-31 NOTE — PROGRESS NOTES
Spoke with access center.  Spoke with GI - unlikely to schedule for simple procedure. Will need to transfer patient.  There are beds  Can be transferred tonight if needed.

## 2025-07-31 NOTE — PROGRESS NOTES
4 Eyes Skin Assessment     NAME:  Marbin Carroll  YOB: 1945  MEDICAL RECORD NUMBER:  [id number]    The patient is being assessed for  Admission    I agree that at least one RN has performed a thorough Head to Toe Skin Assessment on the patient. ALL assessment sites listed below have been assessed.      Areas assessed by both nurses:    Head, Face, Ears, Shoulders, Back, Chest, Arms, Elbows, Hands, Sacrum. Buttock, Coccyx, Ischium, Legs. Feet and Heels, and Under Medical Devices   Pt refused full assessment, denies any wounds.        Does the Patient have a Wound? No noted wound(s)       Dani Prevention initiated by RN: No  Wound Care Orders initiated by RN: No    For hospital-acquired stage 1 & 2 and ALL Stage 3,4, Unstageable, DTI, NWPT, and Complex wounds: place order “IP Wound Care/Ostomy Nurse Eval and Treat” by RN under : No    New Ostomies, if present place, Ostomy referral order under : No     Nurse 1 eSignature: Electronically signed by Dorcas Miranda RN on 7/31/25 at 6:20 AM EDT    **SHARE this note so that the co-signing nurse can place an eSignature**    Nurse 2 eSignature: Electronically signed by Mary Brady RN on 7/31/25 at 6:22 AM EDT

## 2025-07-31 NOTE — DISCHARGE SUMMARY
[facility]   Discharge summary   Patient ID:  Marbin Carroll  [id number]  80 y.o.  1945    Admit date: 7/30/2025    Discharge date and time: 8/1/2025    Admission Diagnoses:   Patient Active Problem List   Diagnosis    History of prostate cancer    Diverticulosis    History of stroke    Hyperlipidemia    Colonic diverticular abscess    Diverticulitis of colon    Bacteremia due to Enterobacter species    Moderate protein-calorie malnutrition    Hallux rigidus of left foot    Post-op pain    Nonrheumatic aortic valve stenosis    Muscle cramps at night    Essential hypertension, benign    Heart murmur    Impotence of organic origin    Disorder of prostate    Diverticulitis    Acute cystitis without hematuria    Pyelonephritis    Colovesical fistula    Hyperglycemia    Hypophosphatemia    Septic shock (HCC)    Ascending cholangitis (HCC)    Cholangitis (HCC)    Biliary stricture (HCC)    Hilar cholangiocarcinoma (HCC)    Transaminitis    Hyperbilirubinemia       Discharge Diagnoses: concern ascending cholangitis, elevated LFT, biliary stricture/obstruction    Consults: GI and hepatobiliary oncology    Procedures: none    Hospital Course: The patient is a 80 y.o. male of Carmina Hi MD with significant past medical history of BPH, HTN, GERD, HLD, and recurrent cholangitis who presents with malaise and chills.     Discharged 1 month ago from [facility] [place] for cholangitis with enterobacter bacteremia.  GI exchanged biliary stent and did biopsy.   D/c on IV abx via midline.    For last few days, increased fatigue, malaise, chills and low grade fever.   No N/V or abd pain. Fever in ER.  Concern for cholangitis again, especially with elevated LFT and direct bilirubin.  Normal WBC.    Started on IV ceftriaxone and flagyl.   GI consulted for possible ERCP. Recommend transfer to [facility] [place] - needs ERCP/EUS with spshanna, for biopsy of biliary obstruction/stricture.  Started on actigall.    Hepatobiliary oncology  in the lever.  There is abrupt contour deformity of the portal vein in this region at the allan hepaticus.  These findings are concordant with the provided history of hilar cholangiocarcinoma, though the discrete lesion is not well delineated.  This precludes evaluation for hepatic arterial involvement there is right lobe intrahepatic bile duct stent.  There has been prior cholecystectomy. *Spleen: Normal *Kidneys: There is left upper pole renal cyst.  There are few bilateral renal sinus cysts.  No hydronephrosis. *Adrenal Glands: Normal *Pancreas: Normal *Gallbladder and bile ducts: Normal *GI/Bowel: No dilated small or large bowel.There is anastomotic suture line in the mid sigmoid colon. *Genitourinary: An implantable penile pump is present. *Urinary Bladder: Normal Vessels: Normal Peritoneum: Normal Retroperitoneum: Normal Lymph nodes: Normal Abdominal wall: Normal Bones: No suspicious lytic or blastic lesion.     Abrupt caliber change of intrahepatic bile ducts from dilated to collapsed at the allan hepaticus with associated contour abnormality the main portal vein at this level is concordant with the provided history of cholangiocarcinoma, though the discrete lesion is not confidently identified.  Main portal vein contour deformity suggests venous involvement.  No contour deformity associated with the hepatic artery.     CT CHEST W CONTRAST  Result Date: 7/31/2025  EXAMINATION: CT OF THE CHEST WITH CONTRAST 7/31/2025 4:14 pm TECHNIQUE: CT of the chest was performed with the administration of intravenous contrast. Multiplanar reformatted images are provided for review. Automated exposure control, iterative reconstruction, and/or weight based adjustment of the mA/kV was utilized to reduce the radiation dose to as low as reasonably achievable. COMPARISON: None. HISTORY: ORDERING SYSTEM PROVIDED HISTORY: rule out metastatic disease TECHNOLOGIST PROVIDED HISTORY: Reason for exam:->rule out metastatic disease

## 2025-07-31 NOTE — CARE COORDINATION
Transition of Care-met with patient, introduced myself and CM role in care coordination. Patient came to Ed d/t chills and body aches. Readmit, left [facility] 7/07 and returned home with wife Flora, the couple resides in a [place] home. Last admission patient required IV antibiotics, he went to [facility], this is completed. PCP is Dr. Hi, preferred pharmacy is [facility]. No history of HHC, SNF or DME. Patient will return home with wife, no anticipated needs.      Briana ENRIQUEZN, RN  [facility]

## 2025-07-31 NOTE — CONSULTS
ERCP 7/2/25- 1) biliary stent//sludge debris/pancreatic stent removal and replacement (7 Syrian by 12 cm duodenal bend plastic biliary stent). 2) Proximal common hepatic duct stricture - possible malignant process versus chronic inflammation, 2 cm in length -areas of nodularity and increased vascularity, somewhat asymmetric - spyglass/cholangioscopy performed with spy bite.  Currently, pt reports no fever or chills. No current complaints.      Past Medical History:        Diagnosis Date    Arthritis     Benign prostatic hyperplasia     Diverticulosis     Erectile dysfunction     Impotence of organic origin    GERD (gastroesophageal reflux disease)     Heart murmur     History of prostate cancer     prostate- disorder of prostate    History of stroke 2003    l eye    Hypercholesterolemia     Hyperlipidemia     MVP (mitral valve prolapse)      Past Surgical History:        Procedure Laterality Date    ARTHRODESIS Left 1/11/2019    LEFT FOOT ARTHRODESIS 1ST METATARSOPHALANGEAL JOINT performed by Griffin Leary DPM at [facility] OR    BLADDER SUSPENSION      BREAST SURGERY  2011    l lump neg    COLECTOMY Left 10/10/2022    LAPAROSCOPIC ROBOTIC XI ASSISTED LEFT COLON RESECTION POSSIBLE OPEN performed by Lui Jimenes MD at [facility] OR    COLONOSCOPY  2012    4/18/2016,9/19/2007,2/14/2011    CYSTOSCOPY Bilateral 10/10/2022    CYSTOSCOPY EXTERNAL URETERAL STENT INSERTION performed by Lui Jimenes MD at [facility] OR    ERCP N/A 7/2/2025    ENDOSCOPIC RETROGRADE CHOLANGIOPANCREATOGRAPHY WITH SPY GLASS performed by Yared Garnett DO at [facility] ENDOSCOPY    ERCP N/A 7/2/2025    ENDOSCOPIC RETROGRADE CHOLANGIOPANCREATOGRAPHY STENT REMOVAL performed by Yared Garnett DO at [facility] ENDOSCOPY    ERCP N/A 7/2/2025    ENDOSCOPIC RETROGRADE CHOLANGIOPANCREATOGRAPHY STENT INSERTION performed by Yared Garnett DO at [facility] ENDOSCOPY    ERCP N/A 7/2/2025    ENDOSCOPIC RETROGRADE CHOLANGIOPANCREATOGRAPHY BIOPSY performed by Yared Garnett  intrahepatic biliary ductal dilatation, primarily in the left lobe. 3. Small amount of fluid in the gallbladder fossa similar to the CT dated 06/24/2025. Few echogenic foci in the gallbladder fossa could represent sutures or less likely retained stones/gas from recent surgery. 4. Right renal cyst.     US LIVER  Result Date: 7/6/2025  EXAMINATION: RIGHT UPPER QUADRANT ULTRASOUND 7/6/2025 10:34 am COMPARISON: None. HISTORY: ORDERING SYSTEM PROVIDED HISTORY: r/o hepatic thrombosis TECHNOLOGIST PROVIDED HISTORY: Reason for exam:->r/o hepatic thrombosis What reading provider will be dictating this exam?->CRC FINDINGS: LIVER:  Liver is normal in size with mildly heterogeneous echotexture.  Mild central intrahepatic biliary ductal dilatation.  Bile duct stent is noted. Main portal vein: Hepatopetal flow with a velocity of 88 cm/second Right portal vein: Patent with normal direction of flow, velocity 86 cm/second Left portal vein: Occlusion of the left portal vein Hepatic artery: Elevated velocity of 150 cm/second. IVC is patent Splenic vein is patent BILIARY SYSTEM: Surgically absent, right common bile duct stent small amount of fluid in the gallbladder fossa similar to the CT dated 06/24/2025.  Few echogenic foci in the gallbladder fossa could represent sutures or small amount of gas from recent surgery.  Stent in the common bile duct.. RIGHT KIDNEY: Right kidney is normal in size.  There is a wedge-shaped defect in the upper pole compatible with old infarct or injury.  There is a lower pole exophytic cyst measuring up to 3.3 cm. PANCREAS:  Visualized portions of the pancreatic body and tail appear normal. Pancreatic duct measures 2-3 mm, within normal limits OTHER: No evidence of right upper quadrant ascites.     1. Occlusion of the left portal vein. 2. Mild central intrahepatic biliary ductal dilatation, primarily in the left lobe. 3. Small amount of fluid in the gallbladder fossa similar to the CT dated 06/24/2025. Few

## 2025-07-31 NOTE — PROGRESS NOTES
Spiritual Health History and Assessment/Progress Note  [facility]    Initial Encounter, Spiritual/Emotional Needs,  ,  ,      Name: Marbin Carroll MRN: [id number]    Age: 80 y.o.     Sex: male   Language: English   Alevism: Worship   Cholangitis (HCC)     Date: 7/31/2025                           Spiritual Assessment began in [facility]/TELE        Referral/Consult From: Rounding   Encounter Overview/Reason: Initial Encounter, Spiritual/Emotional Needs  Service Provided For: Patient, Significant other    Christianne, Belief, Meaning:   Patient identifies as spiritual, is connected with a christianne tradition or spiritual practice, and has beliefs or practices that help with coping during difficult times  Family/Friends identify as spiritual, are connected with a christianne tradition or spiritual practice, and have beliefs or practices that help with coping during difficult times      Importance and Influence:  Patient has spiritual/personal beliefs that influence decisions regarding their health  Family/Friends have spiritual/personal beliefs that influence decisions regarding the patient's health    Community:  Patient is connected with a spiritual community and feels well-supported. Support system includes: Spouse/Partner, Children, Christianne Community, and Friends  Family/Friends are connected with a spiritual community: and feel well-supported. Support system includes: Spouse/Partner, Children, Christianne Community, and Friends    Assessment and Plan of Care:     Patient Interventions include: Facilitated expression of thoughts and feelings, Explored spiritual coping/struggle/distress, Affirmed coping skills/support systems, and Provided sacramental/Christianity ritual  Family/Friends Interventions include: Facilitated expression of thoughts and feelings, Explored spiritual coping/struggle/distress, Affirmed coping skills/support systems, and Provided sacramental/Christianity ritual    Patient Plan of Care: Spiritual

## 2025-07-31 NOTE — ED PROVIDER NOTES
[facility]TELE  EMERGENCY DEPARTMENT ENCOUNTER        Pt Name: Marbin Carroll  MRN: [id number]  Birthdate 1945  Date of evaluation: 7/30/2025  Provider: Johann Sandoval DO  PCP: Carmina Hi MD  Note Started: 11:43 PM EDT 7/30/25    CHIEF COMPLAINT       Chief Complaint   Patient presents with    Chills     States \"had gallbladder removed month ago and had sepsis, also have a stent that they said they were supposed to take it out but they haven't\"       HISTORY OF PRESENT ILLNESS: 1 or more Elements   History received from: Patient    Marbin Carroll is a 80 y.o. male who presents to the emergency department with chief complaint of generalized illness.  Patient states that he has been feeling unwell over the past several days where he has had intermittent chills and is just feeling fatigued.  States that he did have a cholecystectomy recently and ERCP with stent still in.  Patient states that he has not had significant cough and has not had a fever that he has noticed at home but is very chilled.  Denies any hematuria or dysuria denies any constipation or diarrhea.  Denies any lightheadedness or dizziness.  States that he is just not feeling well and so he came to the ER.    Nursing Notes were all reviewed and agreed with or any disagreements were addressed in the HPI.    REVIEW OF SYSTEMS :    Positives and Pertinent negatives as per HPI.    PAST MEDICAL HISTORY/Chronic Conditions Affecting Care    has a past medical history of Arthritis, Benign prostatic hyperplasia, Diverticulosis, Erectile dysfunction, GERD (gastroesophageal reflux disease), Heart murmur, History of prostate cancer, History of stroke (2003), Hypercholesterolemia, Hyperlipidemia, and MVP (mitral valve prolapse).     SURGICAL HISTORY     Past Surgical History:   Procedure Laterality Date    ARTHRODESIS Left 1/11/2019    LEFT FOOT ARTHRODESIS 1ST METATARSOPHALANGEAL JOINT performed by Griffin Leary DPM at [facility] OR    BLADDER      1. Cholangitis (HCC)    2. Hyperbilirubinemia    3. Sepsis, due to unspecified organism, unspecified whether acute organ dysfunction present (HCC)          DISPOSITION/PLAN   DISPOSITION Admitted 07/30/2025 11:42:26 PM    PATIENT REFERRED TO:  No follow-up provider specified.    DISCHARGE MEDICATIONS:  Current Discharge Medication List               (Please note that portions of this note were completed with a voice recognition program.  Efforts were made to edit the dictations but occasionally words are mis-transcribed.)    Johann Sandoval DO (electronically signed)        Johann Sandoval,   07/31/25 1438

## 2025-07-31 NOTE — H&P
[facility]   History and Physical      CHIEF COMPLAINT:  malaise and chills    Reason for Admission:  cholangitis    History Obtained From:  patient, electronic medical record    HISTORY OF PRESENT ILLNESS:      The patient is a 80 y.o. male of Carmina Hi MD with significant past medical history of BPH, HTN, GERD, HLD, and recurrent cholangitis who presents with malaise and chills.     [place] [facility] [place] with enterobacter bacteremia.  GI exchanged biliary stent and did biopsy.   On IV abx via midline.    For last few days, increased fatigue, malaise, chills and low grade fever.   No N/V or abd pain.  In ER, +fever - given tylenol.  In ER, given ceftriaxone, flagyl, 2 L NS bolus.     Seen by GI and bilary oncology.  Doing well. No complaints.    All labs personally reviewed   All imaging personally reviewed     Past Medical History:        Diagnosis Date    Arthritis     Benign prostatic hyperplasia     Diverticulosis     Erectile dysfunction     Impotence of organic origin    GERD (gastroesophageal reflux disease)     Heart murmur     History of prostate cancer     prostate- disorder of prostate    History of stroke 2003    l eye    Hypercholesterolemia     Hyperlipidemia     MVP (mitral valve prolapse)      Past Surgical History:        Procedure Laterality Date    ARTHRODESIS Left 1/11/2019    LEFT FOOT ARTHRODESIS 1ST METATARSOPHALANGEAL JOINT performed by Griffin Leary DPM at [facility] OR    BLADDER SUSPENSION      BREAST SURGERY  2011    l lump neg    COLECTOMY Left 10/10/2022    LAPAROSCOPIC ROBOTIC XI ASSISTED LEFT COLON RESECTION POSSIBLE OPEN performed by Lui Jimenes MD at [facility] OR    COLONOSCOPY  2012 4/18/2016,9/19/2007,2/14/2011    CYSTOSCOPY Bilateral 10/10/2022    CYSTOSCOPY EXTERNAL URETERAL STENT INSERTION performed by Lui Jimenes MD at [facility] OR    ERCP N/A 7/2/2025    ENDOSCOPIC RETROGRADE CHOLANGIOPANCREATOGRAPHY WITH SPY GLASS performed by

## 2025-08-01 VITALS
RESPIRATION RATE: 17 BRPM | DIASTOLIC BLOOD PRESSURE: 63 MMHG | WEIGHT: 150 LBS | TEMPERATURE: 98.4 F | OXYGEN SATURATION: 96 % | HEIGHT: 64 IN | SYSTOLIC BLOOD PRESSURE: 140 MMHG | HEART RATE: 55 BPM | BODY MASS INDEX: 25.61 KG/M2

## 2025-08-01 PROBLEM — E80.6 HYPERBILIRUBINEMIA: Status: ACTIVE | Noted: 2025-08-01

## 2025-08-01 LAB
ALBUMIN SERPL-MCNC: 3.3 G/DL (ref 3.5–5.2)
ALP SERPL-CCNC: 150 U/L (ref 40–129)
ALT SERPL-CCNC: 118 U/L (ref 0–50)
ANION GAP SERPL CALCULATED.3IONS-SCNC: 11 MMOL/L (ref 7–16)
AST SERPL-CCNC: 49 U/L (ref 0–50)
BASOPHILS # BLD: 0.06 K/UL (ref 0–0.2)
BASOPHILS NFR BLD: 1 % (ref 0–2)
BILIRUB SERPL-MCNC: 0.7 MG/DL (ref 0–1.2)
BUN SERPL-MCNC: 10 MG/DL (ref 8–23)
CALCIUM SERPL-MCNC: 9 MG/DL (ref 8.8–10.2)
CHLORIDE SERPL-SCNC: 108 MMOL/L (ref 98–107)
CO2 SERPL-SCNC: 21 MMOL/L (ref 22–29)
CREAT SERPL-MCNC: 0.7 MG/DL (ref 0.7–1.2)
EKG ATRIAL RATE: 79 BPM
EKG P AXIS: 46 DEGREES
EKG P-R INTERVAL: 142 MS
EKG Q-T INTERVAL: 360 MS
EKG QRS DURATION: 78 MS
EKG QTC CALCULATION (BAZETT): 412 MS
EKG R AXIS: 50 DEGREES
EKG T AXIS: 51 DEGREES
EKG VENTRICULAR RATE: 79 BPM
EOSINOPHIL # BLD: 0.44 K/UL (ref 0.05–0.5)
EOSINOPHILS RELATIVE PERCENT: 6 % (ref 0–6)
ERYTHROCYTE [DISTWIDTH] IN BLOOD BY AUTOMATED COUNT: 13.1 % (ref 11.5–15)
GFR, ESTIMATED: >90 ML/MIN/1.73M2
GLUCOSE SERPL-MCNC: 113 MG/DL (ref 74–99)
HCT VFR BLD AUTO: 35.7 % (ref 37–54)
HGB BLD-MCNC: 12 G/DL (ref 12.5–16.5)
IMM GRANULOCYTES # BLD AUTO: 0.03 K/UL (ref 0–0.58)
IMM GRANULOCYTES NFR BLD: 0 % (ref 0–5)
LYMPHOCYTES NFR BLD: 0.83 K/UL (ref 1.5–4)
LYMPHOCYTES RELATIVE PERCENT: 12 % (ref 20–42)
MCH RBC QN AUTO: 32.4 PG (ref 26–35)
MCHC RBC AUTO-ENTMCNC: 33.6 G/DL (ref 32–34.5)
MCV RBC AUTO: 96.5 FL (ref 80–99.9)
MONOCYTES NFR BLD: 0.95 K/UL (ref 0.1–0.95)
MONOCYTES NFR BLD: 13 % (ref 2–12)
NEUTROPHILS NFR BLD: 68 % (ref 43–80)
NEUTS SEG NFR BLD: 4.92 K/UL (ref 1.8–7.3)
PLATELET # BLD AUTO: 206 K/UL (ref 130–450)
PMV BLD AUTO: 10 FL (ref 7–12)
POTASSIUM SERPL-SCNC: 4.1 MMOL/L (ref 3.5–5.1)
PROT SERPL-MCNC: 5.9 G/DL (ref 6.4–8.3)
RBC # BLD AUTO: 3.7 M/UL (ref 3.8–5.8)
SODIUM SERPL-SCNC: 140 MMOL/L (ref 136–145)
WBC OTHER # BLD: 7.2 K/UL (ref 4.5–11.5)

## 2025-08-01 PROCEDURE — 93010 ELECTROCARDIOGRAM REPORT: CPT | Performed by: INTERNAL MEDICINE

## 2025-08-01 PROCEDURE — 6360000002 HC RX W HCPCS: Performed by: NURSE PRACTITIONER

## 2025-08-01 PROCEDURE — 85025 COMPLETE CBC W/AUTO DIFF WBC: CPT

## 2025-08-01 PROCEDURE — 6370000000 HC RX 637 (ALT 250 FOR IP): Performed by: NURSE PRACTITIONER

## 2025-08-01 PROCEDURE — 1200000000 HC SEMI PRIVATE

## 2025-08-01 PROCEDURE — 80053 COMPREHEN METABOLIC PANEL: CPT

## 2025-08-01 PROCEDURE — 99232 SBSQ HOSP IP/OBS MODERATE 35: CPT | Performed by: STUDENT IN AN ORGANIZED HEALTH CARE EDUCATION/TRAINING PROGRAM

## 2025-08-01 PROCEDURE — 2500000003 HC RX 250 WO HCPCS: Performed by: NURSE PRACTITIONER

## 2025-08-01 PROCEDURE — 2580000003 HC RX 258: Performed by: NURSE PRACTITIONER

## 2025-08-01 PROCEDURE — 99232 SBSQ HOSP IP/OBS MODERATE 35: CPT | Performed by: CLINICAL NURSE SPECIALIST

## 2025-08-01 RX ADMIN — METRONIDAZOLE 500 MG: 500 INJECTION, SOLUTION INTRAVENOUS at 13:53

## 2025-08-01 RX ADMIN — WATER 1000 MG: 1 INJECTION INTRAMUSCULAR; INTRAVENOUS; SUBCUTANEOUS at 21:49

## 2025-08-01 RX ADMIN — URSODIOL 300 MG: 300 CAPSULE ORAL at 08:13

## 2025-08-01 RX ADMIN — SODIUM CHLORIDE: 0.9 INJECTION, SOLUTION INTRAVENOUS at 05:04

## 2025-08-01 RX ADMIN — URSODIOL 300 MG: 300 CAPSULE ORAL at 21:51

## 2025-08-01 RX ADMIN — MAGNESIUM OXIDE 400 MG (241.3 MG MAGNESIUM) TABLET 400 MG: TABLET at 08:13

## 2025-08-01 RX ADMIN — SODIUM CHLORIDE, PRESERVATIVE FREE 10 ML: 5 INJECTION INTRAVENOUS at 21:51

## 2025-08-01 RX ADMIN — POTASSIUM CHLORIDE 5 MEQ: 750 TABLET, EXTENDED RELEASE ORAL at 21:51

## 2025-08-01 RX ADMIN — METRONIDAZOLE 500 MG: 500 INJECTION, SOLUTION INTRAVENOUS at 21:54

## 2025-08-01 RX ADMIN — METRONIDAZOLE 500 MG: 500 INJECTION, SOLUTION INTRAVENOUS at 06:00

## 2025-08-01 RX ADMIN — Medication 1 TABLET: at 08:13

## 2025-08-01 ASSESSMENT — PAIN SCALES - GENERAL
PAINLEVEL_OUTOF10: 0
PAINLEVEL_OUTOF10: 0

## 2025-08-01 NOTE — CONSULTS
[facility]  Hematology/Oncology  Consult      Patient Name: Marbin Carroll  YOB: 1945  PCP: Carmina Hi MD   Referring Provider:      Reason for Consultation:   Chief Complaint   Patient presents with    Chills     States \"had gallbladder removed month ago and had sepsis, also have a stent that they said they were supposed to take it out but they haven't\"        History of Present Illness: This is a 80-year-old male patient with a PMH of prostate cancer status post prostatectomy in 2013, GERD, CVA, HLD, recent ERCP on 7/2/2025 for CHD stricture in which biopsies were done and negative, and cholecystectomy 6/23/2025 who presented to the ED for evaluation of low-grade fever and chills and admitted for concern of cholangitis and was started on ceftriaxone and flagyl.  CT A/P showed cholecystectomy.  No gross abnormalities in the cholecystectomy bed.  Biliary stent noted.  Intrahepatic biliary duct dilatation is present.  CT triphasic showed abrupt caliber change in the intrahepatic bile ducts from dilated to collapse at the allan hepaticus with associated contour abnormality the main portal vein at this level is concordant.  No discrete lesion is not confidently identified.  Main portal vein contour deformity suggests venous involvement.  No contour deformity associated with hepatic artery.  CT chest shows no evidence of metastatic disease in the chest.  No acute cardiopulmonary disease.  Common bile duct stent with dilated intrahepatic bile ducts and pneumobilia. GI was consulted and patient is to be transferred [facility] for ERCP.  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 If a malignancy he would be looking at chemotherapy and immunotherapy. Ca 19-9,  CEA 2.9 done on 7/3/25. CMP CMP unremarkable. LFT's with Alk Phos 150, . CBC with Hgb  12.0 MCV WNL. Consultation for possible PVT, CBD stricture.     Review of systems: Over 10 systems were reviewed and all were negative except as mentioned above    Diagnostic Data:     Past Medical History:   Diagnosis Date    Arthritis     Benign prostatic hyperplasia     Diverticulosis     Erectile dysfunction     Impotence of organic origin    GERD (gastroesophageal reflux disease)     Heart murmur     History of prostate cancer     prostate- disorder of prostate    History of stroke 2003    l eye    Hypercholesterolemia     Hyperlipidemia     MVP (mitral valve prolapse)        Patient Active Problem List    Diagnosis Date Noted    Colonic diverticular abscess 04/08/2018    Hypophosphatemia 10/12/2022    Hyperglycemia 10/10/2022    Acute cystitis without hematuria 10/03/2022    Pyelonephritis 10/03/2022    Colovesical fistula 10/03/2022    Diverticulitis 10/02/2022    Bacteremia due to Enterobacter species 04/10/2018    Hyperbilirubinemia 08/01/2025    Biliary stricture (HCC) 07/31/2025    Hilar cholangiocarcinoma (HCC) 07/31/2025    Transaminitis 07/31/2025    Cholangitis (HCC) 07/30/2025    Ascending cholangitis (HCC) 07/05/2025    Septic shock (HCC) 07/03/2025    Essential hypertension, benign 12/10/2019    Muscle cramps at night 10/07/2019    Nonrheumatic aortic valve stenosis 06/10/2019    Hallux rigidus of left foot 01/11/2019    Post-op pain 01/11/2019    Moderate protein-calorie malnutrition 04/10/2018    Diverticulitis of colon 04/08/2018    History of prostate cancer     Diverticulosis     Hyperlipidemia     Heart murmur 08/18/2005    Impotence of organic origin 08/18/2005    Disorder of prostate 08/18/2005    History of stroke 01/01/2003        Past Surgical History:   Procedure Laterality Date    ARTHRODESIS Left 1/11/2019    LEFT FOOT ARTHRODESIS 1ST METATARSOPHALANGEAL JOINT performed by Griffin Leary DPM at [facility] OR    BLADDER SUSPENSION      BREAST SURGERY  2011    l lump neg    COLECTOMY Left

## 2025-08-01 NOTE — CARE COORDINATION
Transition of Care-waiting for a bed at [facility], admitted for Cholangitis, needs a EUS/ERCP with spyglass for biopsy to rule out cholangiocarcinoma. Ambulance form/envelope in soft chart for transfer. Patient is independent from home with wife, no anticipated discharge needs at this time.    Briana ENRIQUEZN, RN  [facility]

## 2025-08-01 NOTE — PROGRESS NOTES
Per access center, bed assigned to .    Room 7402-A  Report # [id number]    PAS Pickup ETA is [phone]

## 2025-08-01 NOTE — PROGRESS NOTES
Jaspreet Walters with [facility] - still no bed available at [facility].

## 2025-08-01 NOTE — PROGRESS NOTES
Hepatobiliary and Pancreatic Surgery Progress Note    CC: Cholangitis     Subjective: Patient states he feels good, no issues. Denies abd pain, n/v, diarrhea.     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 If a malignancy he would be looking at chemotherapy and immunotherapy   - For tx to [facility] [place], [place], accepted, awaiting bed. For EUS with spyglass with Dr. Garnett at [facility] [place] [place]  - It was discussed with the patient that he may need a diagnostic laparoscopy if this is unsuccessful       Thank you for the consultation and allowing me to take part in Mr. Cordova's care.    Greater than or equal to 35 minutes was spent providing face-to-face patient care, including:  and coordinating care, reviewing the chart, labs, and diagnostics, as well as medical decision making. Greater than 50% of this time was spent instructing and counseling the patient face to face regarding findings and recommendations.      Allen HILLIARD-ACNS-BC, FNP-BC 8/1/2025 6:58 AM       Attending Physician Statement:    Chief Complaint:   Chief Complaint   Patient presents with    Chills     States \"had gallbladder removed month ago and had sepsis, also have a stent that they said they were supposed to take it out but

## 2025-08-01 NOTE — PROGRESS NOTES
Patient not in room. Will try to see later.  Vitals stable.     LFT trending down. Blood cx neg x36 hrs.  Afebrile since admit.    Cont IV flagyl and ceftriaxone.  Cont actigall. Hold aspirin.    EUS/ERCP with spyglass for biopsy to r/o cholangiocarcinoma.   Working on transfer to [facility] [place]. No beds yet.  Needs   HBO and GI following.

## 2025-08-01 NOTE — PROGRESS NOTES
PROGRESS NOTE        Patient Presents with/Seen in Consultation For      Reason for Consult: Intrahepatic biliary dilation, elevated LFTs and bilirubin, fevers and chills throughout the day   CHIEF COMPLAINT: Chills     Subjective:     Patient seen resting comfortably in bed.  NAD.  Currently denies any nausea, vomiting or abdominal pain.  Tolerated diet.  Bowel movements without any melena or hematochezia.  POC discussed with patient    Review of Systems  Aside from what was mentioned in the PMH and HPI, essentially unremarkable, all others negative.    Objective:     BP (!) 112/57   Pulse 53   Temp 97.3 °F (36.3 °C) (Oral)   Resp 16   Ht 1.626 m (5' 4\")   Wt 68 kg (150 lb)   SpO2 94%   BMI 25.75 kg/m²     General appearance: alert, awake, laying in bed, and cooperative  Eyes: conjunctiva normal, sclera anicteric. PERRL.  Lungs: clear to auscultation bilaterally  Heart: regular rate and rhythm, no murmur, 2+ pulses; no edema  Abdomen: soft, non-tender; bowel sounds normal; no masses,  no organomegaly  Extremities: extremities without edema  Pulses: 2+ and symmetric  Skin: Skin color, texture, turgor normal.   Neurologic: Grossly normal    sodium chloride flush 0.9 % injection 5-40 mL, 2 times per day  sodium chloride flush 0.9 % injection 5-40 mL, PRN  0.9 % sodium chloride infusion, PRN  potassium chloride (KLOR-CON M) extended release tablet 40 mEq, PRN   Or  potassium bicarb-citric acid (EFFER-K) effervescent tablet 40 mEq, PRN   Or  potassium chloride 10 mEq/100 mL IVPB (Peripheral Line), PRN  magnesium sulfate 2000 mg in 50 mL IVPB premix, PRN  ondansetron (ZOFRAN-ODT) disintegrating tablet 4 mg, Q8H PRN   Or  ondansetron (ZOFRAN) injection 4 mg, Q6H PRN  bisacodyl (DULCOLAX) EC tablet 5 mg, Daily PRN  amLODIPine (NORVASC) tablet 5 mg, Daily  [Held by provider] aspirin chewable tablet 81 mg, Daily  vitamin B and C (TOTAL B-C) tablet 1 tablet, Daily  magnesium oxide (MAG-OX) tablet 400 mg,  Daily  potassium chloride (KLOR-CON M) extended release tablet 5 mEq, Nightly  cefTRIAXone (ROCEPHIN) 1,000 mg in sterile water 10 mL IV syringe, Q24H  metroNIDAZOLE (FLAGYL) 500 mg in 0.9% NaCl 100 mL IVPB premix, Q8H  ursodiol (ACTIGALL) capsule 300 mg, BID         Data Review  CBC:   Lab Results   Component Value Date/Time    WBC 7.2 08/01/2025 04:34 AM    RBC 3.70 08/01/2025 04:34 AM    HGB 12.0 08/01/2025 04:34 AM    HCT 35.7 08/01/2025 04:34 AM    MCV 96.5 08/01/2025 04:34 AM    MCH 32.4 08/01/2025 04:34 AM    MCHC 33.6 08/01/2025 04:34 AM    RDW 13.1 08/01/2025 04:34 AM     08/01/2025 04:34 AM    MPV 10.0 08/01/2025 04:34 AM     CMP:    Lab Results   Component Value Date/Time     08/01/2025 04:34 AM    K 4.1 08/01/2025 04:34 AM    K 4.0 10/04/2022 09:15 AM     08/01/2025 04:34 AM    CO2 21 08/01/2025 04:34 AM    BUN 10 08/01/2025 04:34 AM    CREATININE 0.7 08/01/2025 04:34 AM    GFRAA >60 10/13/2022 05:02 AM    LABGLOM >90 08/01/2025 04:34 AM    LABGLOM >60 11/21/2023 11:56 AM    GLUCOSE 113 08/01/2025 04:34 AM    GLUCOSE 113 09/25/2020 07:54 AM    CALCIUM 9.0 08/01/2025 04:34 AM    BILITOT 0.7 08/01/2025 04:34 AM    ALKPHOS 150 08/01/2025 04:34 AM    AST 49 08/01/2025 04:34 AM     08/01/2025 04:34 AM     Hepatic Function Panel:    Lab Results   Component Value Date/Time    ALKPHOS 150 08/01/2025 04:34 AM     08/01/2025 04:34 AM    AST 49 08/01/2025 04:34 AM    BILITOT 0.7 08/01/2025 04:34 AM    BILIDIR 2.2 07/30/2025 05:37 PM    IBILI 1.0 07/30/2025 05:37 PM     No components found for: \"CHLPL\"    Lab Results   Component Value Date    TRIG 134 06/05/2020    TRIG 189 (H) 12/10/2019    TRIG 121 06/10/2019       Lab Results   Component Value Date    HDL 45 06/05/2020    HDL 44 12/10/2019    HDL 48 06/10/2019     No components found for: \"LDLCALC\"  No components found for: \"LABVLDL\"   PT/INR:    Lab Results   Component Value Date/Time    PROTIME 12.8 07/31/2025 10:18 AM    INR

## 2025-08-02 ENCOUNTER — HOSPITAL ENCOUNTER (INPATIENT)
Age: 80
LOS: 3 days | Discharge: HOME | DRG: 444 | End: 2025-08-05
Attending: INTERNAL MEDICINE | Admitting: INTERNAL MEDICINE
Payer: MEDICARE

## 2025-08-02 DIAGNOSIS — K83.09 CHOLANGITIS (HCC): ICD-10-CM

## 2025-08-02 PROCEDURE — 2500000003 HC RX 250 WO HCPCS: Performed by: NURSE PRACTITIONER

## 2025-08-02 PROCEDURE — 2060000000 HC ICU INTERMEDIATE R&B

## 2025-08-02 PROCEDURE — 6360000002 HC RX W HCPCS: Performed by: INTERNAL MEDICINE

## 2025-08-02 PROCEDURE — 2580000003 HC RX 258: Performed by: NURSE PRACTITIONER

## 2025-08-02 PROCEDURE — 99232 SBSQ HOSP IP/OBS MODERATE 35: CPT | Performed by: STUDENT IN AN ORGANIZED HEALTH CARE EDUCATION/TRAINING PROGRAM

## 2025-08-02 PROCEDURE — 6360000002 HC RX W HCPCS: Performed by: NURSE PRACTITIONER

## 2025-08-02 PROCEDURE — 6370000000 HC RX 637 (ALT 250 FOR IP): Performed by: NURSE PRACTITIONER

## 2025-08-02 RX ORDER — CHLORAL HYDRATE 500 MG
1 CAPSULE ORAL DAILY
Status: DISCONTINUED | OUTPATIENT
Start: 2025-08-02 | End: 2025-08-02

## 2025-08-02 RX ORDER — METRONIDAZOLE 500 MG/100ML
500 INJECTION, SOLUTION INTRAVENOUS EVERY 8 HOURS
Status: DISCONTINUED | OUTPATIENT
Start: 2025-08-02 | End: 2025-08-05 | Stop reason: HOSPADM

## 2025-08-02 RX ORDER — ONDANSETRON 2 MG/ML
4 INJECTION INTRAMUSCULAR; INTRAVENOUS EVERY 6 HOURS PRN
Status: DISCONTINUED | OUTPATIENT
Start: 2025-08-02 | End: 2025-08-05 | Stop reason: HOSPADM

## 2025-08-02 RX ORDER — POTASSIUM CHLORIDE 7.45 MG/ML
10 INJECTION INTRAVENOUS PRN
Status: ACTIVE | OUTPATIENT
Start: 2025-08-02 | End: 2025-08-05

## 2025-08-02 RX ORDER — SODIUM CHLORIDE 0.9 % (FLUSH) 0.9 %
5-40 SYRINGE (ML) INJECTION PRN
Status: DISCONTINUED | OUTPATIENT
Start: 2025-08-02 | End: 2025-08-05 | Stop reason: HOSPADM

## 2025-08-02 RX ORDER — POTASSIUM CHLORIDE 750 MG/1
5 TABLET, EXTENDED RELEASE ORAL NIGHTLY
Status: DISCONTINUED | OUTPATIENT
Start: 2025-08-02 | End: 2025-08-05 | Stop reason: HOSPADM

## 2025-08-02 RX ORDER — MULTIVITAMIN WITH IRON
1 TABLET ORAL DAILY
Status: DISCONTINUED | OUTPATIENT
Start: 2025-08-02 | End: 2025-08-05 | Stop reason: HOSPADM

## 2025-08-02 RX ORDER — ASPIRIN 81 MG/1
81 TABLET ORAL DAILY
Status: DISCONTINUED | OUTPATIENT
Start: 2025-08-02 | End: 2025-08-05 | Stop reason: HOSPADM

## 2025-08-02 RX ORDER — SODIUM CHLORIDE 9 MG/ML
INJECTION, SOLUTION INTRAVENOUS PRN
Status: DISCONTINUED | OUTPATIENT
Start: 2025-08-02 | End: 2025-08-05 | Stop reason: HOSPADM

## 2025-08-02 RX ORDER — URSODIOL 300 MG/1
300 CAPSULE ORAL 2 TIMES DAILY
Status: DISCONTINUED | OUTPATIENT
Start: 2025-08-02 | End: 2025-08-05 | Stop reason: HOSPADM

## 2025-08-02 RX ORDER — LANOLIN ALCOHOL/MO/W.PET/CERES
400 CREAM (GRAM) TOPICAL DAILY
Status: DISCONTINUED | OUTPATIENT
Start: 2025-08-02 | End: 2025-08-05 | Stop reason: HOSPADM

## 2025-08-02 RX ORDER — AMLODIPINE BESYLATE 5 MG/1
5 TABLET ORAL DAILY
Status: DISCONTINUED | OUTPATIENT
Start: 2025-08-02 | End: 2025-08-05 | Stop reason: HOSPADM

## 2025-08-02 RX ORDER — SODIUM CHLORIDE 0.9 % (FLUSH) 0.9 %
5-40 SYRINGE (ML) INJECTION EVERY 12 HOURS SCHEDULED
Status: DISCONTINUED | OUTPATIENT
Start: 2025-08-02 | End: 2025-08-05 | Stop reason: HOSPADM

## 2025-08-02 RX ORDER — MAGNESIUM SULFATE IN WATER 40 MG/ML
2000 INJECTION, SOLUTION INTRAVENOUS PRN
Status: DISCONTINUED | OUTPATIENT
Start: 2025-08-02 | End: 2025-08-05 | Stop reason: HOSPADM

## 2025-08-02 RX ORDER — ONDANSETRON 4 MG/1
4 TABLET, ORALLY DISINTEGRATING ORAL EVERY 8 HOURS PRN
Status: DISCONTINUED | OUTPATIENT
Start: 2025-08-02 | End: 2025-08-05 | Stop reason: HOSPADM

## 2025-08-02 RX ORDER — BISACODYL 5 MG/1
5 TABLET, DELAYED RELEASE ORAL DAILY PRN
Status: DISCONTINUED | OUTPATIENT
Start: 2025-08-02 | End: 2025-08-05 | Stop reason: HOSPADM

## 2025-08-02 RX ORDER — POTASSIUM CHLORIDE 1500 MG/1
40 TABLET, EXTENDED RELEASE ORAL PRN
Status: ACTIVE | OUTPATIENT
Start: 2025-08-02 | End: 2025-08-05

## 2025-08-02 RX ORDER — ENOXAPARIN SODIUM 100 MG/ML
40 INJECTION SUBCUTANEOUS DAILY
Status: DISCONTINUED | OUTPATIENT
Start: 2025-08-02 | End: 2025-08-05 | Stop reason: HOSPADM

## 2025-08-02 RX ORDER — SODIUM CHLORIDE 9 MG/ML
INJECTION, SOLUTION INTRAVENOUS CONTINUOUS
Status: DISCONTINUED | OUTPATIENT
Start: 2025-08-02 | End: 2025-08-02

## 2025-08-02 RX ADMIN — METRONIDAZOLE 500 MG: 500 INJECTION, SOLUTION INTRAVENOUS at 05:41

## 2025-08-02 RX ADMIN — ENOXAPARIN SODIUM 40 MG: 100 INJECTION SUBCUTANEOUS at 09:22

## 2025-08-02 RX ADMIN — METRONIDAZOLE 500 MG: 500 INJECTION, SOLUTION INTRAVENOUS at 14:22

## 2025-08-02 RX ADMIN — SODIUM CHLORIDE, PRESERVATIVE FREE 10 ML: 5 INJECTION INTRAVENOUS at 20:25

## 2025-08-02 RX ADMIN — SODIUM CHLORIDE: 0.9 INJECTION, SOLUTION INTRAVENOUS at 05:39

## 2025-08-02 RX ADMIN — POTASSIUM CHLORIDE 5 MEQ: 750 TABLET, EXTENDED RELEASE ORAL at 20:18

## 2025-08-02 RX ADMIN — URSODIOL 300 MG: 300 CAPSULE ORAL at 20:18

## 2025-08-02 RX ADMIN — WATER 1000 MG: 1 INJECTION INTRAMUSCULAR; INTRAVENOUS; SUBCUTANEOUS at 20:18

## 2025-08-02 RX ADMIN — SODIUM CHLORIDE, PRESERVATIVE FREE 10 ML: 5 INJECTION INTRAVENOUS at 09:05

## 2025-08-02 RX ADMIN — URSODIOL 300 MG: 300 CAPSULE ORAL at 09:05

## 2025-08-02 RX ADMIN — METRONIDAZOLE 500 MG: 500 INJECTION, SOLUTION INTRAVENOUS at 22:27

## 2025-08-02 RX ADMIN — Medication 1 TABLET: at 09:04

## 2025-08-02 RX ADMIN — Medication 400 MG: at 09:04

## 2025-08-02 ASSESSMENT — PAIN SCALES - GENERAL
PAINLEVEL_OUTOF10: 0
PAINLEVEL_OUTOF10: 0

## 2025-08-02 NOTE — PLAN OF CARE
Problem: Discharge Planning  Goal: Discharge to home or other facility with appropriate resources  Outcome: Progressing  Flowsheets (Taken 8/1/2025 8323)  Discharge to home or other facility with appropriate resources: Identify barriers to discharge with patient and caregiver

## 2025-08-03 LAB
ALBUMIN SERPL-MCNC: 3.5 G/DL (ref 3.5–5.2)
ALP SERPL-CCNC: 137 U/L (ref 40–129)
ALT SERPL-CCNC: 64 U/L (ref 0–50)
ANION GAP SERPL CALCULATED.3IONS-SCNC: 10 MMOL/L (ref 7–16)
AST SERPL-CCNC: 24 U/L (ref 0–50)
BASOPHILS # BLD: 0.07 K/UL (ref 0–0.2)
BASOPHILS NFR BLD: 1 % (ref 0–2)
BILIRUB SERPL-MCNC: 0.5 MG/DL (ref 0–1.2)
BUN SERPL-MCNC: 12 MG/DL (ref 8–23)
CALCIUM SERPL-MCNC: 9.2 MG/DL (ref 8.8–10.2)
CHLORIDE SERPL-SCNC: 107 MMOL/L (ref 98–107)
CO2 SERPL-SCNC: 22 MMOL/L (ref 22–29)
CREAT SERPL-MCNC: 0.7 MG/DL (ref 0.7–1.2)
EOSINOPHIL # BLD: 0.48 K/UL (ref 0.05–0.5)
EOSINOPHILS RELATIVE PERCENT: 8 % (ref 0–6)
ERYTHROCYTE [DISTWIDTH] IN BLOOD BY AUTOMATED COUNT: 12.9 % (ref 11.5–15)
GFR, ESTIMATED: >90 ML/MIN/1.73M2
GLUCOSE SERPL-MCNC: 113 MG/DL (ref 74–99)
HCT VFR BLD AUTO: 39.2 % (ref 37–54)
HGB BLD-MCNC: 12.7 G/DL (ref 12.5–16.5)
IMM GRANULOCYTES # BLD AUTO: <0.03 K/UL (ref 0–0.58)
IMM GRANULOCYTES NFR BLD: 0 % (ref 0–5)
LYMPHOCYTES NFR BLD: 1.13 K/UL (ref 1.5–4)
LYMPHOCYTES RELATIVE PERCENT: 18 % (ref 20–42)
MCH RBC QN AUTO: 32.1 PG (ref 26–35)
MCHC RBC AUTO-ENTMCNC: 32.4 G/DL (ref 32–34.5)
MCV RBC AUTO: 99 FL (ref 80–99.9)
MICROORGANISM SPEC CULT: NORMAL
MICROORGANISM SPEC CULT: NORMAL
MONOCYTES NFR BLD: 0.79 K/UL (ref 0.1–0.95)
MONOCYTES NFR BLD: 12 % (ref 2–12)
NEUTROPHILS NFR BLD: 61 % (ref 43–80)
NEUTS SEG NFR BLD: 3.87 K/UL (ref 1.8–7.3)
PLATELET # BLD AUTO: 223 K/UL (ref 130–450)
PMV BLD AUTO: 9.8 FL (ref 7–12)
POTASSIUM SERPL-SCNC: 4.5 MMOL/L (ref 3.5–5.1)
PROT SERPL-MCNC: 6 G/DL (ref 6.4–8.3)
RBC # BLD AUTO: 3.96 M/UL (ref 3.8–5.8)
SERVICE CMNT-IMP: NORMAL
SERVICE CMNT-IMP: NORMAL
SODIUM SERPL-SCNC: 140 MMOL/L (ref 136–145)
SPECIMEN DESCRIPTION: NORMAL
SPECIMEN DESCRIPTION: NORMAL
WBC OTHER # BLD: 6.4 K/UL (ref 4.5–11.5)

## 2025-08-03 PROCEDURE — 6360000002 HC RX W HCPCS: Performed by: INTERNAL MEDICINE

## 2025-08-03 PROCEDURE — 99232 SBSQ HOSP IP/OBS MODERATE 35: CPT | Performed by: STUDENT IN AN ORGANIZED HEALTH CARE EDUCATION/TRAINING PROGRAM

## 2025-08-03 PROCEDURE — 80053 COMPREHEN METABOLIC PANEL: CPT

## 2025-08-03 PROCEDURE — 6370000000 HC RX 637 (ALT 250 FOR IP): Performed by: NURSE PRACTITIONER

## 2025-08-03 PROCEDURE — 6360000002 HC RX W HCPCS: Performed by: NURSE PRACTITIONER

## 2025-08-03 PROCEDURE — 2500000003 HC RX 250 WO HCPCS: Performed by: NURSE PRACTITIONER

## 2025-08-03 PROCEDURE — 2060000000 HC ICU INTERMEDIATE R&B

## 2025-08-03 PROCEDURE — 36415 COLL VENOUS BLD VENIPUNCTURE: CPT

## 2025-08-03 PROCEDURE — 85025 COMPLETE CBC W/AUTO DIFF WBC: CPT

## 2025-08-03 RX ADMIN — SODIUM CHLORIDE, PRESERVATIVE FREE 10 ML: 5 INJECTION INTRAVENOUS at 20:30

## 2025-08-03 RX ADMIN — POTASSIUM CHLORIDE 5 MEQ: 750 TABLET, EXTENDED RELEASE ORAL at 20:29

## 2025-08-03 RX ADMIN — Medication 1 TABLET: at 09:39

## 2025-08-03 RX ADMIN — ENOXAPARIN SODIUM 40 MG: 100 INJECTION SUBCUTANEOUS at 09:40

## 2025-08-03 RX ADMIN — AMLODIPINE BESYLATE 5 MG: 5 TABLET ORAL at 09:39

## 2025-08-03 RX ADMIN — Medication 400 MG: at 09:39

## 2025-08-03 RX ADMIN — METRONIDAZOLE 500 MG: 500 INJECTION, SOLUTION INTRAVENOUS at 14:44

## 2025-08-03 RX ADMIN — METRONIDAZOLE 500 MG: 500 INJECTION, SOLUTION INTRAVENOUS at 06:00

## 2025-08-03 RX ADMIN — URSODIOL 300 MG: 300 CAPSULE ORAL at 09:39

## 2025-08-03 RX ADMIN — WATER 1000 MG: 1 INJECTION INTRAMUSCULAR; INTRAVENOUS; SUBCUTANEOUS at 20:29

## 2025-08-03 RX ADMIN — SODIUM CHLORIDE, PRESERVATIVE FREE 10 ML: 5 INJECTION INTRAVENOUS at 09:40

## 2025-08-03 RX ADMIN — URSODIOL 300 MG: 300 CAPSULE ORAL at 20:29

## 2025-08-03 RX ADMIN — METRONIDAZOLE 500 MG: 500 INJECTION, SOLUTION INTRAVENOUS at 20:37

## 2025-08-04 ENCOUNTER — ANESTHESIA EVENT (OUTPATIENT)
Dept: ENDOSCOPY | Age: 80
DRG: 444 | End: 2025-08-04
Payer: MEDICARE

## 2025-08-04 ENCOUNTER — ANESTHESIA (OUTPATIENT)
Dept: ENDOSCOPY | Age: 80
DRG: 444 | End: 2025-08-04
Payer: MEDICARE

## 2025-08-04 ENCOUNTER — APPOINTMENT (OUTPATIENT)
Dept: GENERAL RADIOLOGY | Age: 80
DRG: 444 | End: 2025-08-04
Attending: INTERNAL MEDICINE
Payer: MEDICARE

## 2025-08-04 LAB
ALBUMIN SERPL-MCNC: 3.6 G/DL (ref 3.5–5.2)
ALP SERPL-CCNC: 127 U/L (ref 40–129)
ALT SERPL-CCNC: 58 U/L (ref 0–50)
ANION GAP SERPL CALCULATED.3IONS-SCNC: 10 MMOL/L (ref 7–16)
AST SERPL-CCNC: 28 U/L (ref 0–50)
BASOPHILS # BLD: 0.09 K/UL (ref 0–0.2)
BASOPHILS NFR BLD: 1 % (ref 0–2)
BILIRUB SERPL-MCNC: 0.5 MG/DL (ref 0–1.2)
BUN SERPL-MCNC: 13 MG/DL (ref 8–23)
CALCIUM SERPL-MCNC: 9.3 MG/DL (ref 8.8–10.2)
CHLORIDE SERPL-SCNC: 105 MMOL/L (ref 98–107)
CO2 SERPL-SCNC: 25 MMOL/L (ref 22–29)
CREAT SERPL-MCNC: 0.7 MG/DL (ref 0.7–1.2)
EOSINOPHIL # BLD: 0.44 K/UL (ref 0.05–0.5)
EOSINOPHILS RELATIVE PERCENT: 7 % (ref 0–6)
ERYTHROCYTE [DISTWIDTH] IN BLOOD BY AUTOMATED COUNT: 12.8 % (ref 11.5–15)
GFR, ESTIMATED: >90 ML/MIN/1.73M2
GLUCOSE SERPL-MCNC: 109 MG/DL (ref 74–99)
HCT VFR BLD AUTO: 39.4 % (ref 37–54)
HGB BLD-MCNC: 12.8 G/DL (ref 12.5–16.5)
IMM GRANULOCYTES # BLD AUTO: <0.03 K/UL (ref 0–0.58)
IMM GRANULOCYTES NFR BLD: 0 % (ref 0–5)
INR PPP: 1.1
LYMPHOCYTES NFR BLD: 1.21 K/UL (ref 1.5–4)
LYMPHOCYTES RELATIVE PERCENT: 19 % (ref 20–42)
MCH RBC QN AUTO: 32.2 PG (ref 26–35)
MCHC RBC AUTO-ENTMCNC: 32.5 G/DL (ref 32–34.5)
MCV RBC AUTO: 99 FL (ref 80–99.9)
MICROORGANISM SPEC CULT: NORMAL
MICROORGANISM SPEC CULT: NORMAL
MONOCYTES NFR BLD: 0.84 K/UL (ref 0.1–0.95)
MONOCYTES NFR BLD: 14 % (ref 2–12)
NEUTROPHILS NFR BLD: 58 % (ref 43–80)
NEUTS SEG NFR BLD: 3.65 K/UL (ref 1.8–7.3)
PLATELET # BLD AUTO: 259 K/UL (ref 130–450)
PMV BLD AUTO: 9.9 FL (ref 7–12)
POTASSIUM SERPL-SCNC: 4.2 MMOL/L (ref 3.5–5.1)
PROT SERPL-MCNC: 6 G/DL (ref 6.4–8.3)
PROTHROMBIN TIME: 12.4 SEC (ref 9.3–12.4)
RBC # BLD AUTO: 3.98 M/UL (ref 3.8–5.8)
SERVICE CMNT-IMP: NORMAL
SERVICE CMNT-IMP: NORMAL
SODIUM SERPL-SCNC: 140 MMOL/L (ref 136–145)
SPECIMEN DESCRIPTION: NORMAL
SPECIMEN DESCRIPTION: NORMAL
WBC OTHER # BLD: 6.2 K/UL (ref 4.5–11.5)

## 2025-08-04 PROCEDURE — 2709999900 HC NON-CHARGEABLE SUPPLY: Performed by: INTERNAL MEDICINE

## 2025-08-04 PROCEDURE — C1726 CATH, BAL DIL, NON-VASCULAR: HCPCS | Performed by: INTERNAL MEDICINE

## 2025-08-04 PROCEDURE — 7100000000 HC PACU RECOVERY - FIRST 15 MIN: Performed by: INTERNAL MEDICINE

## 2025-08-04 PROCEDURE — 3700000001 HC ADD 15 MINUTES (ANESTHESIA): Performed by: INTERNAL MEDICINE

## 2025-08-04 PROCEDURE — 88305 TISSUE EXAM BY PATHOLOGIST: CPT

## 2025-08-04 PROCEDURE — 2060000000 HC ICU INTERMEDIATE R&B

## 2025-08-04 PROCEDURE — 0FPB8DZ REMOVAL OF INTRALUMINAL DEVICE FROM HEPATOBILIARY DUCT, VIA NATURAL OR ARTIFICIAL OPENING ENDOSCOPIC: ICD-10-PCS | Performed by: INTERNAL MEDICINE

## 2025-08-04 PROCEDURE — 6370000000 HC RX 637 (ALT 250 FOR IP)

## 2025-08-04 PROCEDURE — 6360000004 HC RX CONTRAST MEDICATION: Performed by: INTERNAL MEDICINE

## 2025-08-04 PROCEDURE — 0FD98ZX EXTRACTION OF COMMON BILE DUCT, VIA NATURAL OR ARTIFICIAL OPENING ENDOSCOPIC, DIAGNOSTIC: ICD-10-PCS | Performed by: INTERNAL MEDICINE

## 2025-08-04 PROCEDURE — C1769 GUIDE WIRE: HCPCS | Performed by: INTERNAL MEDICINE

## 2025-08-04 PROCEDURE — 85025 COMPLETE CBC W/AUTO DIFF WBC: CPT

## 2025-08-04 PROCEDURE — 80053 COMPREHEN METABOLIC PANEL: CPT

## 2025-08-04 PROCEDURE — 0F798DZ DILATION OF COMMON BILE DUCT WITH INTRALUMINAL DEVICE, VIA NATURAL OR ARTIFICIAL OPENING ENDOSCOPIC: ICD-10-PCS | Performed by: INTERNAL MEDICINE

## 2025-08-04 PROCEDURE — 6360000002 HC RX W HCPCS: Performed by: NURSE PRACTITIONER

## 2025-08-04 PROCEDURE — 3609015100 HC ERCP STENT PLACEMENT BILIARY/PANCREATIC DUCT: Performed by: INTERNAL MEDICINE

## 2025-08-04 PROCEDURE — 7100000001 HC PACU RECOVERY - ADDTL 15 MIN: Performed by: INTERNAL MEDICINE

## 2025-08-04 PROCEDURE — APPSS45 APP SPLIT SHARED TIME 31-45 MINUTES: Performed by: CLINICAL NURSE SPECIALIST

## 2025-08-04 PROCEDURE — 3700000000 HC ANESTHESIA ATTENDED CARE: Performed by: INTERNAL MEDICINE

## 2025-08-04 PROCEDURE — 2720000010 HC SURG SUPPLY STERILE: Performed by: INTERNAL MEDICINE

## 2025-08-04 PROCEDURE — 3609018800 HC ERCP DX COLLECTION SPECIMEN BRUSHING/WASHING: Performed by: INTERNAL MEDICINE

## 2025-08-04 PROCEDURE — 85610 PROTHROMBIN TIME: CPT

## 2025-08-04 PROCEDURE — BF131ZZ FLUOROSCOPY OF GALLBLADDER AND BILE DUCTS USING LOW OSMOLAR CONTRAST: ICD-10-PCS | Performed by: INTERNAL MEDICINE

## 2025-08-04 PROCEDURE — 2500000003 HC RX 250 WO HCPCS

## 2025-08-04 PROCEDURE — 3609015000 HC ERCP REMOVE FOREIGN BODY/STENT BILIARY/PANC DUCT: Performed by: INTERNAL MEDICINE

## 2025-08-04 PROCEDURE — 6370000000 HC RX 637 (ALT 250 FOR IP): Performed by: NURSE PRACTITIONER

## 2025-08-04 PROCEDURE — 3609018900 HC ERCP: Performed by: INTERNAL MEDICINE

## 2025-08-04 PROCEDURE — 99232 SBSQ HOSP IP/OBS MODERATE 35: CPT | Performed by: STUDENT IN AN ORGANIZED HEALTH CARE EDUCATION/TRAINING PROGRAM

## 2025-08-04 PROCEDURE — 2500000003 HC RX 250 WO HCPCS: Performed by: NURSE PRACTITIONER

## 2025-08-04 PROCEDURE — C2625 STENT, NON-COR, TEM W/DEL SY: HCPCS | Performed by: INTERNAL MEDICINE

## 2025-08-04 PROCEDURE — 36415 COLL VENOUS BLD VENIPUNCTURE: CPT

## 2025-08-04 PROCEDURE — 2580000003 HC RX 258

## 2025-08-04 PROCEDURE — 88112 CYTOPATH CELL ENHANCE TECH: CPT

## 2025-08-04 PROCEDURE — 6360000002 HC RX W HCPCS

## 2025-08-04 PROCEDURE — 2580000003 HC RX 258: Performed by: STUDENT IN AN ORGANIZED HEALTH CARE EDUCATION/TRAINING PROGRAM

## 2025-08-04 PROCEDURE — 3609014300 HC ERCP BALLOON DILATE BILIARY/PANC DUCT/AMPULLA EA: Performed by: INTERNAL MEDICINE

## 2025-08-04 DEVICE — IMPLANTABLE DEVICE: Type: IMPLANTABLE DEVICE | Status: FUNCTIONAL

## 2025-08-04 RX ORDER — IOPAMIDOL 612 MG/ML
INJECTION, SOLUTION INTRAVASCULAR PRN
Status: DISCONTINUED | OUTPATIENT
Start: 2025-08-04 | End: 2025-08-04 | Stop reason: ALTCHOICE

## 2025-08-04 RX ORDER — ROCURONIUM BROMIDE 10 MG/ML
INJECTION, SOLUTION INTRAVENOUS
Status: DISCONTINUED | OUTPATIENT
Start: 2025-08-04 | End: 2025-08-04 | Stop reason: SDUPTHER

## 2025-08-04 RX ORDER — PHENYLEPHRINE HCL IN 0.9% NACL 1 MG/10 ML
SYRINGE (ML) INTRAVENOUS
Status: DISCONTINUED | OUTPATIENT
Start: 2025-08-04 | End: 2025-08-04 | Stop reason: SDUPTHER

## 2025-08-04 RX ORDER — LIDOCAINE HYDROCHLORIDE 20 MG/ML
INJECTION, SOLUTION INTRAVENOUS
Status: DISCONTINUED | OUTPATIENT
Start: 2025-08-04 | End: 2025-08-04 | Stop reason: SDUPTHER

## 2025-08-04 RX ORDER — SODIUM CHLORIDE 9 MG/ML
INJECTION, SOLUTION INTRAVENOUS
Status: DISCONTINUED | OUTPATIENT
Start: 2025-08-04 | End: 2025-08-04 | Stop reason: SDUPTHER

## 2025-08-04 RX ORDER — SODIUM CHLORIDE, SODIUM LACTATE, POTASSIUM CHLORIDE, CALCIUM CHLORIDE 600; 310; 30; 20 MG/100ML; MG/100ML; MG/100ML; MG/100ML
INJECTION, SOLUTION INTRAVENOUS CONTINUOUS
Status: DISCONTINUED | OUTPATIENT
Start: 2025-08-04 | End: 2025-08-05

## 2025-08-04 RX ORDER — MIDAZOLAM HYDROCHLORIDE 1 MG/ML
INJECTION, SOLUTION INTRAMUSCULAR; INTRAVENOUS
Status: DISCONTINUED | OUTPATIENT
Start: 2025-08-04 | End: 2025-08-04 | Stop reason: SDUPTHER

## 2025-08-04 RX ORDER — PROPOFOL 10 MG/ML
INJECTION, EMULSION INTRAVENOUS
Status: DISCONTINUED | OUTPATIENT
Start: 2025-08-04 | End: 2025-08-04 | Stop reason: SDUPTHER

## 2025-08-04 RX ORDER — ACETAMINOPHEN 325 MG/1
650 TABLET ORAL EVERY 4 HOURS PRN
Status: DISCONTINUED | OUTPATIENT
Start: 2025-08-04 | End: 2025-08-05 | Stop reason: HOSPADM

## 2025-08-04 RX ORDER — FENTANYL CITRATE 50 UG/ML
INJECTION, SOLUTION INTRAMUSCULAR; INTRAVENOUS
Status: DISCONTINUED | OUTPATIENT
Start: 2025-08-04 | End: 2025-08-04 | Stop reason: SDUPTHER

## 2025-08-04 RX ADMIN — PROPOFOL 50 MG: 10 INJECTION, EMULSION INTRAVENOUS at 17:05

## 2025-08-04 RX ADMIN — Medication 400 MG: at 10:06

## 2025-08-04 RX ADMIN — FENTANYL CITRATE 50 MCG: 50 INJECTION, SOLUTION INTRAMUSCULAR; INTRAVENOUS at 15:06

## 2025-08-04 RX ADMIN — SODIUM CHLORIDE, PRESERVATIVE FREE 10 ML: 5 INJECTION INTRAVENOUS at 21:03

## 2025-08-04 RX ADMIN — AMLODIPINE BESYLATE 5 MG: 5 TABLET ORAL at 10:06

## 2025-08-04 RX ADMIN — METRONIDAZOLE 500 MG: 500 INJECTION, SOLUTION INTRAVENOUS at 05:23

## 2025-08-04 RX ADMIN — ROCURONIUM BROMIDE 50 MG: 10 INJECTION, SOLUTION INTRAVENOUS at 15:11

## 2025-08-04 RX ADMIN — WATER 1000 MG: 1 INJECTION INTRAMUSCULAR; INTRAVENOUS; SUBCUTANEOUS at 21:04

## 2025-08-04 RX ADMIN — PROPOFOL 180 MG: 10 INJECTION, EMULSION INTRAVENOUS at 15:09

## 2025-08-04 RX ADMIN — FENTANYL CITRATE 50 MCG: 50 INJECTION, SOLUTION INTRAMUSCULAR; INTRAVENOUS at 15:13

## 2025-08-04 RX ADMIN — Medication 1 TABLET: at 10:06

## 2025-08-04 RX ADMIN — URSODIOL 300 MG: 300 CAPSULE ORAL at 21:03

## 2025-08-04 RX ADMIN — PROPOFOL 100 MG: 10 INJECTION, EMULSION INTRAVENOUS at 16:28

## 2025-08-04 RX ADMIN — ONDANSETRON 4 MG: 4 TABLET, ORALLY DISINTEGRATING ORAL at 21:59

## 2025-08-04 RX ADMIN — Medication 200 MCG: at 16:36

## 2025-08-04 RX ADMIN — LIDOCAINE HYDROCHLORIDE 100 MG: 20 INJECTION, SOLUTION INTRAVENOUS at 15:11

## 2025-08-04 RX ADMIN — METRONIDAZOLE 500 MG: 500 INJECTION, SOLUTION INTRAVENOUS at 14:20

## 2025-08-04 RX ADMIN — SODIUM CHLORIDE, SODIUM LACTATE, POTASSIUM CHLORIDE, AND CALCIUM CHLORIDE: .6; .31; .03; .02 INJECTION, SOLUTION INTRAVENOUS at 07:20

## 2025-08-04 RX ADMIN — ACETAMINOPHEN 650 MG: 325 TABLET ORAL at 21:59

## 2025-08-04 RX ADMIN — Medication 100 MCG: at 15:15

## 2025-08-04 RX ADMIN — METRONIDAZOLE 500 MG: 500 INJECTION, SOLUTION INTRAVENOUS at 22:01

## 2025-08-04 RX ADMIN — SUGAMMADEX 200 MG: 100 INJECTION, SOLUTION INTRAVENOUS at 17:18

## 2025-08-04 RX ADMIN — MIDAZOLAM 2 MG: 1 INJECTION INTRAMUSCULAR; INTRAVENOUS at 15:06

## 2025-08-04 RX ADMIN — SODIUM CHLORIDE: 9 INJECTION, SOLUTION INTRAVENOUS at 14:54

## 2025-08-04 RX ADMIN — URSODIOL 300 MG: 300 CAPSULE ORAL at 10:06

## 2025-08-04 RX ADMIN — Medication 100 MCG: at 16:22

## 2025-08-04 RX ADMIN — Medication 200 MCG: at 15:38

## 2025-08-04 RX ADMIN — POTASSIUM CHLORIDE 5 MEQ: 750 TABLET, EXTENDED RELEASE ORAL at 21:59

## 2025-08-04 ASSESSMENT — PAIN DESCRIPTION - DESCRIPTORS: DESCRIPTORS: SORE;TENDER

## 2025-08-04 ASSESSMENT — PAIN SCALES - GENERAL: PAINLEVEL_OUTOF10: 3

## 2025-08-04 ASSESSMENT — PAIN DESCRIPTION - ORIENTATION: ORIENTATION: MID

## 2025-08-04 ASSESSMENT — PAIN SCALES - WONG BAKER: WONGBAKER_NUMERICALRESPONSE: NO HURT

## 2025-08-04 ASSESSMENT — PAIN - FUNCTIONAL ASSESSMENT: PAIN_FUNCTIONAL_ASSESSMENT: ACTIVITIES ARE NOT PREVENTED

## 2025-08-04 ASSESSMENT — PAIN DESCRIPTION - LOCATION: LOCATION: THROAT

## 2025-08-04 ASSESSMENT — LIFESTYLE VARIABLES: SMOKING_STATUS: 0

## 2025-08-05 VITALS
SYSTOLIC BLOOD PRESSURE: 131 MMHG | WEIGHT: 152.12 LBS | RESPIRATION RATE: 16 BRPM | TEMPERATURE: 97.3 F | BODY MASS INDEX: 25.97 KG/M2 | DIASTOLIC BLOOD PRESSURE: 64 MMHG | OXYGEN SATURATION: 99 % | HEART RATE: 55 BPM | HEIGHT: 64 IN

## 2025-08-05 LAB
ALBUMIN SERPL-MCNC: 3.4 G/DL (ref 3.5–5.2)
ALP SERPL-CCNC: 124 U/L (ref 40–129)
ALT SERPL-CCNC: 57 U/L (ref 0–50)
ANION GAP SERPL CALCULATED.3IONS-SCNC: 10 MMOL/L (ref 7–16)
AST SERPL-CCNC: 38 U/L (ref 0–50)
BASOPHILS # BLD: 0.05 K/UL (ref 0–0.2)
BASOPHILS NFR BLD: 1 % (ref 0–2)
BILIRUB SERPL-MCNC: 0.6 MG/DL (ref 0–1.2)
BUN SERPL-MCNC: 13 MG/DL (ref 8–23)
CALCIUM SERPL-MCNC: 9 MG/DL (ref 8.8–10.2)
CHLORIDE SERPL-SCNC: 105 MMOL/L (ref 98–107)
CO2 SERPL-SCNC: 24 MMOL/L (ref 22–29)
CREAT SERPL-MCNC: 0.8 MG/DL (ref 0.7–1.2)
EOSINOPHIL # BLD: 0.13 K/UL (ref 0.05–0.5)
EOSINOPHILS RELATIVE PERCENT: 2 % (ref 0–6)
ERYTHROCYTE [DISTWIDTH] IN BLOOD BY AUTOMATED COUNT: 12.7 % (ref 11.5–15)
GFR, ESTIMATED: >90 ML/MIN/1.73M2
GLUCOSE SERPL-MCNC: 107 MG/DL (ref 74–99)
HCT VFR BLD AUTO: 38.7 % (ref 37–54)
HGB BLD-MCNC: 12.8 G/DL (ref 12.5–16.5)
IMM GRANULOCYTES # BLD AUTO: 0.04 K/UL (ref 0–0.58)
IMM GRANULOCYTES NFR BLD: 1 % (ref 0–5)
LYMPHOCYTES NFR BLD: 0.78 K/UL (ref 1.5–4)
LYMPHOCYTES RELATIVE PERCENT: 9 % (ref 20–42)
MCH RBC QN AUTO: 32.5 PG (ref 26–35)
MCHC RBC AUTO-ENTMCNC: 33.1 G/DL (ref 32–34.5)
MCV RBC AUTO: 98.2 FL (ref 80–99.9)
MONOCYTES NFR BLD: 1.08 K/UL (ref 0.1–0.95)
MONOCYTES NFR BLD: 12 % (ref 2–12)
NEUTROPHILS NFR BLD: 76 % (ref 43–80)
NEUTS SEG NFR BLD: 6.74 K/UL (ref 1.8–7.3)
PLATELET # BLD AUTO: 247 K/UL (ref 130–450)
PMV BLD AUTO: 10 FL (ref 7–12)
POTASSIUM SERPL-SCNC: 4.3 MMOL/L (ref 3.5–5.1)
PROT SERPL-MCNC: 5.9 G/DL (ref 6.4–8.3)
RBC # BLD AUTO: 3.94 M/UL (ref 3.8–5.8)
SODIUM SERPL-SCNC: 139 MMOL/L (ref 136–145)
WBC OTHER # BLD: 8.8 K/UL (ref 4.5–11.5)

## 2025-08-05 PROCEDURE — APPSS45 APP SPLIT SHARED TIME 31-45 MINUTES: Performed by: CLINICAL NURSE SPECIALIST

## 2025-08-05 PROCEDURE — 6370000000 HC RX 637 (ALT 250 FOR IP): Performed by: FAMILY MEDICINE

## 2025-08-05 PROCEDURE — 85025 COMPLETE CBC W/AUTO DIFF WBC: CPT

## 2025-08-05 PROCEDURE — 6360000002 HC RX W HCPCS: Performed by: FAMILY MEDICINE

## 2025-08-05 PROCEDURE — 6370000000 HC RX 637 (ALT 250 FOR IP): Performed by: NURSE PRACTITIONER

## 2025-08-05 PROCEDURE — 36415 COLL VENOUS BLD VENIPUNCTURE: CPT

## 2025-08-05 PROCEDURE — 2500000003 HC RX 250 WO HCPCS: Performed by: NURSE PRACTITIONER

## 2025-08-05 PROCEDURE — 2580000003 HC RX 258: Performed by: STUDENT IN AN ORGANIZED HEALTH CARE EDUCATION/TRAINING PROGRAM

## 2025-08-05 PROCEDURE — 99232 SBSQ HOSP IP/OBS MODERATE 35: CPT | Performed by: STUDENT IN AN ORGANIZED HEALTH CARE EDUCATION/TRAINING PROGRAM

## 2025-08-05 PROCEDURE — 80053 COMPREHEN METABOLIC PANEL: CPT

## 2025-08-05 PROCEDURE — 6360000002 HC RX W HCPCS: Performed by: NURSE PRACTITIONER

## 2025-08-05 RX ORDER — URSODIOL 300 MG/1
300 CAPSULE ORAL 2 TIMES DAILY
Qty: 60 CAPSULE | Refills: 3 | Status: SHIPPED | OUTPATIENT
Start: 2025-08-05

## 2025-08-05 RX ORDER — METRONIDAZOLE 500 MG/1
500 TABLET ORAL 3 TIMES DAILY
Qty: 30 TABLET | Refills: 0 | Status: SHIPPED | OUTPATIENT
Start: 2025-08-05 | End: 2025-08-15

## 2025-08-05 RX ADMIN — SODIUM CHLORIDE, PRESERVATIVE FREE 10 ML: 5 INJECTION INTRAVENOUS at 08:42

## 2025-08-05 RX ADMIN — ASPIRIN 81 MG: 81 TABLET, COATED ORAL at 08:40

## 2025-08-05 RX ADMIN — URSODIOL 300 MG: 300 CAPSULE ORAL at 08:40

## 2025-08-05 RX ADMIN — AMLODIPINE BESYLATE 5 MG: 5 TABLET ORAL at 08:40

## 2025-08-05 RX ADMIN — Medication 1 TABLET: at 08:40

## 2025-08-05 RX ADMIN — METRONIDAZOLE 500 MG: 500 INJECTION, SOLUTION INTRAVENOUS at 05:36

## 2025-08-05 RX ADMIN — ENOXAPARIN SODIUM 40 MG: 100 INJECTION SUBCUTANEOUS at 08:41

## 2025-08-05 RX ADMIN — Medication 400 MG: at 08:40

## 2025-08-05 RX ADMIN — SODIUM CHLORIDE, SODIUM LACTATE, POTASSIUM CHLORIDE, AND CALCIUM CHLORIDE: .6; .31; .03; .02 INJECTION, SOLUTION INTRAVENOUS at 05:37

## 2025-08-06 LAB — NON-GYN CYTOLOGY REPORT: NORMAL

## 2025-08-11 LAB — SURGICAL PATHOLOGY REPORT: NORMAL

## 2025-08-15 ENCOUNTER — OFFICE VISIT (OUTPATIENT)
Age: 80
End: 2025-08-15

## 2025-08-15 VITALS
HEIGHT: 64 IN | OXYGEN SATURATION: 97 % | WEIGHT: 149.4 LBS | TEMPERATURE: 98.1 F | DIASTOLIC BLOOD PRESSURE: 67 MMHG | RESPIRATION RATE: 15 BRPM | HEART RATE: 64 BPM | SYSTOLIC BLOOD PRESSURE: 138 MMHG | BODY MASS INDEX: 25.51 KG/M2

## 2025-08-15 DIAGNOSIS — N36.42 URETHRAL SPHINCTER DEFICIENCY: Primary | ICD-10-CM

## 2025-08-15 DIAGNOSIS — C24.0 HILAR CHOLANGIOCARCINOMA (HCC): ICD-10-CM

## 2025-08-15 PROBLEM — K57.92 DIVERTICULITIS: Status: RESOLVED | Noted: 2022-10-02 | Resolved: 2025-08-15

## 2025-08-15 PROBLEM — M20.22 HALLUX RIGIDUS OF LEFT FOOT: Status: RESOLVED | Noted: 2019-01-11 | Resolved: 2025-08-15

## 2025-08-15 PROBLEM — R65.21 SEPTIC SHOCK (HCC): Status: RESOLVED | Noted: 2025-07-03 | Resolved: 2025-08-15

## 2025-08-15 PROBLEM — E80.6 HYPERBILIRUBINEMIA: Status: RESOLVED | Noted: 2025-08-01 | Resolved: 2025-08-15

## 2025-08-15 PROBLEM — R74.01 TRANSAMINITIS: Status: RESOLVED | Noted: 2025-07-31 | Resolved: 2025-08-15

## 2025-08-15 PROBLEM — G89.18 POST-OP PAIN: Status: RESOLVED | Noted: 2019-01-11 | Resolved: 2025-08-15

## 2025-08-15 PROBLEM — A41.9 SEPTIC SHOCK (HCC): Status: RESOLVED | Noted: 2025-07-03 | Resolved: 2025-08-15

## 2025-08-15 PROBLEM — R78.81 BACTEREMIA DUE TO ENTEROBACTER SPECIES: Status: RESOLVED | Noted: 2018-04-10 | Resolved: 2025-08-15

## 2025-08-15 PROBLEM — K83.09: Status: RESOLVED | Noted: 2025-08-02 | Resolved: 2025-08-15

## 2025-08-15 PROBLEM — N30.00 ACUTE CYSTITIS WITHOUT HEMATURIA: Status: RESOLVED | Noted: 2022-10-03 | Resolved: 2025-08-15

## 2025-08-15 PROBLEM — B96.89 BACTEREMIA DUE TO ENTEROBACTER SPECIES: Status: RESOLVED | Noted: 2018-04-10 | Resolved: 2025-08-15

## 2025-08-15 PROBLEM — N12 PYELONEPHRITIS: Status: RESOLVED | Noted: 2022-10-03 | Resolved: 2025-08-15

## 2025-08-15 PROBLEM — R73.9 HYPERGLYCEMIA: Status: RESOLVED | Noted: 2022-10-10 | Resolved: 2025-08-15

## 2025-08-15 PROBLEM — E83.39 HYPOPHOSPHATEMIA: Status: RESOLVED | Noted: 2022-10-12 | Resolved: 2025-08-15

## 2025-08-15 LAB
ALBUMIN: 4.2 G/DL (ref 3.5–5.2)
ALP BLD-CCNC: 149 U/L (ref 40–129)
ALT SERPL-CCNC: 53 U/L (ref 0–50)
AST SERPL-CCNC: 50 U/L (ref 0–50)
BILIRUB SERPL-MCNC: 1.9 MG/DL (ref 0–1.2)
BILIRUBIN DIRECT: 1.3 MG/DL (ref 0–0.2)
BILIRUBIN, INDIRECT: 0.6 MG/DL (ref 0–1)
TOTAL PROTEIN: 7.4 G/DL (ref 6.4–8.3)

## 2025-08-15 ASSESSMENT — ENCOUNTER SYMPTOMS
DIARRHEA: 0
VOMITING: 0
NAUSEA: 0
CONSTIPATION: 0
BLOOD IN STOOL: 0
EYE DISCHARGE: 0
ABDOMINAL PAIN: 0
EYE PAIN: 0
SHORTNESS OF BREATH: 0
PHOTOPHOBIA: 0
BACK PAIN: 0

## 2025-08-17 LAB — CA 19-9: 235 U/ML (ref 0–35)

## 2025-08-18 ENCOUNTER — TELEPHONE (OUTPATIENT)
Age: 80
End: 2025-08-18

## 2025-08-18 ENCOUNTER — OFFICE VISIT (OUTPATIENT)
Dept: CARDIOLOGY CLINIC | Age: 80
End: 2025-08-18
Payer: MEDICARE

## 2025-08-18 VITALS
TEMPERATURE: 96.8 F | HEART RATE: 61 BPM | RESPIRATION RATE: 18 BRPM | SYSTOLIC BLOOD PRESSURE: 144 MMHG | WEIGHT: 149.8 LBS | OXYGEN SATURATION: 95 % | HEIGHT: 64 IN | DIASTOLIC BLOOD PRESSURE: 71 MMHG | BODY MASS INDEX: 25.57 KG/M2

## 2025-08-18 DIAGNOSIS — Z01.810 PREOP CARDIOVASCULAR EXAM: Primary | ICD-10-CM

## 2025-08-18 DIAGNOSIS — C24.0 HILAR CHOLANGIOCARCINOMA (HCC): ICD-10-CM

## 2025-08-18 DIAGNOSIS — R01.1 HEART MURMUR: ICD-10-CM

## 2025-08-18 DIAGNOSIS — I35.0 NONRHEUMATIC AORTIC VALVE STENOSIS: ICD-10-CM

## 2025-08-18 PROCEDURE — 1159F MED LIST DOCD IN RCRD: CPT | Performed by: INTERNAL MEDICINE

## 2025-08-18 PROCEDURE — 93000 ELECTROCARDIOGRAM COMPLETE: CPT | Performed by: INTERNAL MEDICINE

## 2025-08-18 PROCEDURE — 3077F SYST BP >= 140 MM HG: CPT | Performed by: INTERNAL MEDICINE

## 2025-08-18 PROCEDURE — G2211 COMPLEX E/M VISIT ADD ON: HCPCS | Performed by: INTERNAL MEDICINE

## 2025-08-18 PROCEDURE — 3078F DIAST BP <80 MM HG: CPT | Performed by: INTERNAL MEDICINE

## 2025-08-18 PROCEDURE — 1160F RVW MEDS BY RX/DR IN RCRD: CPT | Performed by: INTERNAL MEDICINE

## 2025-08-18 PROCEDURE — 1123F ACP DISCUSS/DSCN MKR DOCD: CPT | Performed by: INTERNAL MEDICINE

## 2025-08-18 PROCEDURE — 99204 OFFICE O/P NEW MOD 45 MIN: CPT | Performed by: INTERNAL MEDICINE

## 2025-08-19 ENCOUNTER — HOSPITAL ENCOUNTER (OUTPATIENT)
Dept: PREADMISSION TESTING | Age: 80
Discharge: HOME | End: 2025-08-19
Payer: MEDICARE

## 2025-08-19 ENCOUNTER — HOSPITAL ENCOUNTER (OUTPATIENT)
Dept: CARDIOLOGY | Age: 80
Discharge: HOME | End: 2025-08-21
Attending: INTERNAL MEDICINE
Payer: MEDICARE

## 2025-08-19 ENCOUNTER — TELEPHONE (OUTPATIENT)
Age: 80
End: 2025-08-19

## 2025-08-19 VITALS
BODY MASS INDEX: 25.4 KG/M2 | DIASTOLIC BLOOD PRESSURE: 69 MMHG | OXYGEN SATURATION: 99 % | SYSTOLIC BLOOD PRESSURE: 155 MMHG | WEIGHT: 148 LBS | TEMPERATURE: 98.6 F | RESPIRATION RATE: 18 BRPM | HEART RATE: 57 BPM

## 2025-08-19 DIAGNOSIS — C24.0 HILAR CHOLANGIOCARCINOMA (HCC): ICD-10-CM

## 2025-08-19 DIAGNOSIS — Z01.812 PRE-OPERATIVE LABORATORY EXAMINATION: Primary | ICD-10-CM

## 2025-08-19 DIAGNOSIS — R01.1 HEART MURMUR: ICD-10-CM

## 2025-08-19 LAB
ABO + RH BLD: NORMAL
ALBUMIN SERPL-MCNC: 3.8 G/DL (ref 3.5–5.2)
ALP SERPL-CCNC: 168 U/L (ref 40–129)
ALT SERPL-CCNC: 107 U/L (ref 0–50)
ANION GAP SERPL CALCULATED.3IONS-SCNC: 12 MMOL/L (ref 7–16)
ARM BAND NUMBER: NORMAL
AST SERPL-CCNC: 92 U/L (ref 0–50)
BILIRUB SERPL-MCNC: 0.7 MG/DL (ref 0–1.2)
BLOOD BANK SAMPLE EXPIRATION: NORMAL
BLOOD GROUP ANTIBODIES SERPL: NEGATIVE
BUN SERPL-MCNC: 16 MG/DL (ref 8–23)
CALCIUM SERPL-MCNC: 10 MG/DL (ref 8.8–10.2)
CHLORIDE SERPL-SCNC: 101 MMOL/L (ref 98–107)
CO2 SERPL-SCNC: 24 MMOL/L (ref 22–29)
CREAT SERPL-MCNC: 0.7 MG/DL (ref 0.7–1.2)
ERYTHROCYTE [DISTWIDTH] IN BLOOD BY AUTOMATED COUNT: 13.2 % (ref 11.5–15)
GFR, ESTIMATED: >90 ML/MIN/1.73M2
GLUCOSE SERPL-MCNC: 165 MG/DL (ref 74–99)
HCT VFR BLD AUTO: 40.8 % (ref 37–54)
HGB BLD-MCNC: 13.2 G/DL (ref 12.5–16.5)
INR PPP: 1
MCH RBC QN AUTO: 32 PG (ref 26–35)
MCHC RBC AUTO-ENTMCNC: 32.4 G/DL (ref 32–34.5)
MCV RBC AUTO: 98.8 FL (ref 80–99.9)
PLATELET # BLD AUTO: 337 K/UL (ref 130–450)
PMV BLD AUTO: 9.7 FL (ref 7–12)
POTASSIUM SERPL-SCNC: 4.4 MMOL/L (ref 3.5–5.1)
PROT SERPL-MCNC: 7 G/DL (ref 6.4–8.3)
PROTHROMBIN TIME: 11.4 SEC (ref 9.3–12.4)
RBC # BLD AUTO: 4.13 M/UL (ref 3.8–5.8)
SODIUM SERPL-SCNC: 137 MMOL/L (ref 136–145)
WBC OTHER # BLD: 5.8 K/UL (ref 4.5–11.5)

## 2025-08-19 PROCEDURE — 80053 COMPREHEN METABOLIC PANEL: CPT

## 2025-08-19 PROCEDURE — 36415 COLL VENOUS BLD VENIPUNCTURE: CPT

## 2025-08-19 PROCEDURE — 86901 BLOOD TYPING SEROLOGIC RH(D): CPT

## 2025-08-19 PROCEDURE — 86900 BLOOD TYPING SEROLOGIC ABO: CPT

## 2025-08-19 PROCEDURE — 86850 RBC ANTIBODY SCREEN: CPT

## 2025-08-19 PROCEDURE — 93306 TTE W/DOPPLER COMPLETE: CPT

## 2025-08-19 PROCEDURE — 85027 COMPLETE CBC AUTOMATED: CPT

## 2025-08-19 PROCEDURE — 87081 CULTURE SCREEN ONLY: CPT

## 2025-08-19 PROCEDURE — 85610 PROTHROMBIN TIME: CPT

## 2025-08-20 LAB
MICROORGANISM SPEC CULT: NORMAL
SPECIMEN DESCRIPTION: NORMAL

## 2025-08-21 ENCOUNTER — RESULTS FOLLOW-UP (OUTPATIENT)
Dept: CARDIOLOGY CLINIC | Age: 80
End: 2025-08-21

## 2025-08-21 LAB
ECHO AO ASC DIAM: 2.2 CM
ECHO AV AREA PEAK VELOCITY: 1.2 CM2
ECHO AV AREA VTI: 1.3 CM2
ECHO AV MEAN GRADIENT: 14 MMHG
ECHO AV MEAN VELOCITY: 1.7 M/S
ECHO AV PEAK GRADIENT: 32 MMHG
ECHO AV PEAK VELOCITY: 2.8 M/S
ECHO AV VELOCITY RATIO: 0.43
ECHO AV VTI: 57 CM
ECHO EST RA PRESSURE: 3 MMHG
ECHO LA DIAMETER: 3.2 CM
ECHO LA VOL A-L A2C: 28 ML (ref 18–58)
ECHO LA VOL A-L A4C: 42 ML (ref 18–58)
ECHO LA VOL MOD A2C: 27 ML (ref 18–58)
ECHO LA VOL MOD A4C: 39 ML (ref 18–58)
ECHO LA VOLUME AREA LENGTH: 34 ML
ECHO LV EF PHYSICIAN: 60 %
ECHO LV FRACTIONAL SHORTENING: 35 % (ref 28–44)
ECHO LV INTERNAL DIMENSION DIASTOLIC: 4.9 CM (ref 4.2–5.9)
ECHO LV INTERNAL DIMENSION SYSTOLIC: 3.2 CM
ECHO LV ISOVOLUMETRIC RELAXATION TIME (IVRT): 64.6 MS
ECHO LV IVSD: 0.9 CM (ref 0.6–1)
ECHO LV IVSS: 1.2 CM
ECHO LV MASS 2D: 153 G (ref 88–224)
ECHO LV POSTERIOR WALL DIASTOLIC: 0.9 CM (ref 0.6–1)
ECHO LV POSTERIOR WALL SYSTOLIC: 1.2 CM
ECHO LV RELATIVE WALL THICKNESS RATIO: 0.37
ECHO LVOT AREA: 2.8 CM2
ECHO LVOT AV VTI INDEX: 0.48
ECHO LVOT DIAM: 1.9 CM
ECHO LVOT MEAN GRADIENT: 3 MMHG
ECHO LVOT PEAK GRADIENT: 6 MMHG
ECHO LVOT PEAK VELOCITY: 1.2 M/S
ECHO LVOT SV: 77.6 ML
ECHO LVOT VTI: 27.4 CM
ECHO MV "A" WAVE DURATION: 78.4 MSEC
ECHO MV A VELOCITY: 0.95 M/S
ECHO MV AREA PHT: 3.3 CM2
ECHO MV AREA VTI: 2.2 CM2
ECHO MV E DECELERATION TIME (DT): 195.8 MS
ECHO MV E VELOCITY: 1.09 M/S
ECHO MV E/A RATIO: 1.15
ECHO MV LVOT VTI INDEX: 1.28
ECHO MV MAX VELOCITY: 1.2 M/S
ECHO MV MEAN GRADIENT: 1 MMHG
ECHO MV MEAN VELOCITY: 0.5 M/S
ECHO MV PEAK GRADIENT: 5 MMHG
ECHO MV PRESSURE HALF TIME (PHT): 66.3 MS
ECHO MV VTI: 35.2 CM
ECHO PULMONARY ARTERY END DIASTOLIC PRESSURE: 2 MMHG
ECHO PV MAX VELOCITY: 1.2 M/S
ECHO PV MEAN GRADIENT: 3 MMHG
ECHO PV MEAN VELOCITY: 0.8 M/S
ECHO PV PEAK GRADIENT: 6 MMHG
ECHO PV REGURGITANT MAX VELOCITY: 0.7 M/S
ECHO PV VTI: 21.8 CM
ECHO PVEIN A DURATION: 73.8 MS
ECHO PVEIN A VELOCITY: 0.3 M/S
ECHO PVEIN PEAK D VELOCITY: 0.6 M/S
ECHO PVEIN PEAK S VELOCITY: 0.9 M/S
ECHO PVEIN S/D RATIO: 1.5
ECHO RIGHT VENTRICULAR SYSTOLIC PRESSURE (RVSP): 24 MMHG
ECHO RV INTERNAL DIMENSION: 1.6 CM
ECHO TV REGURGITANT MAX VELOCITY: 2.29 M/S
ECHO TV REGURGITANT PEAK GRADIENT: 21 MMHG

## 2025-08-21 PROCEDURE — 93306 TTE W/DOPPLER COMPLETE: CPT | Performed by: INTERNAL MEDICINE

## 2025-08-25 ENCOUNTER — ANESTHESIA EVENT (OUTPATIENT)
Dept: OPERATING ROOM | Age: 80
DRG: 988 | End: 2025-08-25
Payer: MEDICARE

## 2025-08-26 ASSESSMENT — LIFESTYLE VARIABLES: SMOKING_STATUS: 0

## 2025-08-27 ENCOUNTER — HOSPITAL ENCOUNTER (INPATIENT)
Age: 80
LOS: 1 days | Discharge: HOME | DRG: 824 | End: 2025-08-27
Attending: TRANSPLANT SURGERY | Admitting: TRANSPLANT SURGERY
Payer: MEDICARE

## 2025-08-27 ENCOUNTER — TELEPHONE (OUTPATIENT)
Age: 80
End: 2025-08-27

## 2025-08-27 ENCOUNTER — APPOINTMENT (OUTPATIENT)
Dept: GENERAL RADIOLOGY | Age: 80
DRG: 824 | End: 2025-08-27
Attending: TRANSPLANT SURGERY
Payer: MEDICARE

## 2025-08-27 ENCOUNTER — ANESTHESIA (OUTPATIENT)
Dept: OPERATING ROOM | Age: 80
DRG: 988 | End: 2025-08-27
Payer: MEDICARE

## 2025-08-27 VITALS
SYSTOLIC BLOOD PRESSURE: 136 MMHG | BODY MASS INDEX: 25.27 KG/M2 | TEMPERATURE: 97 F | WEIGHT: 148 LBS | DIASTOLIC BLOOD PRESSURE: 74 MMHG | HEIGHT: 64 IN | RESPIRATION RATE: 22 BRPM | HEART RATE: 76 BPM | OXYGEN SATURATION: 96 %

## 2025-08-27 DIAGNOSIS — C24.0 CHOLANGIOCARCINOMA AT HEPATIC HILUM (HCC): Primary | ICD-10-CM

## 2025-08-27 DIAGNOSIS — K83.1 BILIARY STRICTURE (HCC): ICD-10-CM

## 2025-08-27 DIAGNOSIS — C77.2 METASTATIC CANCER TO INTRA-ABDOMINAL LYMPH NODES (HCC): ICD-10-CM

## 2025-08-27 DIAGNOSIS — C24.0 HILAR CHOLANGIOCARCINOMA (HCC): Primary | ICD-10-CM

## 2025-08-27 PROBLEM — C80.1 OBSTRUCTIVE JAUNDICE DUE TO MALIGNANT NEOPLASM (HCC): Status: ACTIVE | Noted: 2025-08-27

## 2025-08-27 PROCEDURE — 6360000002 HC RX W HCPCS: Performed by: NURSE ANESTHETIST, CERTIFIED REGISTERED

## 2025-08-27 PROCEDURE — 7100000010 HC PHASE II RECOVERY - FIRST 15 MIN: Performed by: TRANSPLANT SURGERY

## 2025-08-27 PROCEDURE — C1788 PORT, INDWELLING, IMP: HCPCS | Performed by: TRANSPLANT SURGERY

## 2025-08-27 PROCEDURE — 7100000001 HC PACU RECOVERY - ADDTL 15 MIN: Performed by: TRANSPLANT SURGERY

## 2025-08-27 PROCEDURE — 77001 FLUOROGUIDE FOR VEIN DEVICE: CPT | Performed by: TRANSPLANT SURGERY

## 2025-08-27 PROCEDURE — 8E0W4CZ ROBOTIC ASSISTED PROCEDURE OF TRUNK REGION, PERCUTANEOUS ENDOSCOPIC APPROACH: ICD-10-PCS | Performed by: TRANSPLANT SURGERY

## 2025-08-27 PROCEDURE — 36561 INSERT TUNNELED CV CATH: CPT | Performed by: TRANSPLANT SURGERY

## 2025-08-27 PROCEDURE — C1889 IMPLANT/INSERT DEVICE, NOC: HCPCS | Performed by: TRANSPLANT SURGERY

## 2025-08-27 PROCEDURE — 76998 US GUIDE INTRAOP: CPT | Performed by: TRANSPLANT SURGERY

## 2025-08-27 PROCEDURE — 2500000003 HC RX 250 WO HCPCS: Performed by: TRANSPLANT SURGERY

## 2025-08-27 PROCEDURE — 7100000000 HC PACU RECOVERY - FIRST 15 MIN: Performed by: TRANSPLANT SURGERY

## 2025-08-27 PROCEDURE — 88305 TISSUE EXAM BY PATHOLOGIST: CPT

## 2025-08-27 PROCEDURE — 3700000001 HC ADD 15 MINUTES (ANESTHESIA): Performed by: TRANSPLANT SURGERY

## 2025-08-27 PROCEDURE — 7100000011 HC PHASE II RECOVERY - ADDTL 15 MIN: Performed by: TRANSPLANT SURGERY

## 2025-08-27 PROCEDURE — 2500000003 HC RX 250 WO HCPCS: Performed by: CLINICAL NURSE SPECIALIST

## 2025-08-27 PROCEDURE — 07BD4ZZ EXCISION OF AORTIC LYMPHATIC, PERCUTANEOUS ENDOSCOPIC APPROACH: ICD-10-PCS | Performed by: TRANSPLANT SURGERY

## 2025-08-27 PROCEDURE — 38747 REMOVE ABDOMINAL LYMPH NODES: CPT | Performed by: TRANSPLANT SURGERY

## 2025-08-27 PROCEDURE — 38589 UNLISTED LAPS PX LYMPHTC SYS: CPT | Performed by: TRANSPLANT SURGERY

## 2025-08-27 PROCEDURE — 1200000000 HC SEMI PRIVATE

## 2025-08-27 PROCEDURE — 3700000000 HC ANESTHESIA ATTENDED CARE: Performed by: TRANSPLANT SURGERY

## 2025-08-27 PROCEDURE — 6360000002 HC RX W HCPCS: Performed by: TRANSPLANT SURGERY

## 2025-08-27 PROCEDURE — 71045 X-RAY EXAM CHEST 1 VIEW: CPT

## 2025-08-27 PROCEDURE — 2720000010 HC SURG SUPPLY STERILE: Performed by: TRANSPLANT SURGERY

## 2025-08-27 PROCEDURE — 88341 IMHCHEM/IMCYTCHM EA ADD ANTB: CPT

## 2025-08-27 PROCEDURE — 6360000002 HC RX W HCPCS: Performed by: CLINICAL NURSE SPECIALIST

## 2025-08-27 PROCEDURE — 2500000003 HC RX 250 WO HCPCS: Performed by: NURSE ANESTHETIST, CERTIFIED REGISTERED

## 2025-08-27 PROCEDURE — 3600000019 HC SURGERY ROBOT ADDTL 15MIN: Performed by: TRANSPLANT SURGERY

## 2025-08-27 PROCEDURE — 2580000003 HC RX 258: Performed by: CLINICAL NURSE SPECIALIST

## 2025-08-27 PROCEDURE — S2900 ROBOTIC SURGICAL SYSTEM: HCPCS | Performed by: TRANSPLANT SURGERY

## 2025-08-27 PROCEDURE — 0JH63WZ INSERTION OF TOTALLY IMPLANTABLE VASCULAR ACCESS DEVICE INTO CHEST SUBCUTANEOUS TISSUE AND FASCIA, PERCUTANEOUS APPROACH: ICD-10-PCS | Performed by: TRANSPLANT SURGERY

## 2025-08-27 PROCEDURE — 02HV33Z INSERTION OF INFUSION DEVICE INTO SUPERIOR VENA CAVA, PERCUTANEOUS APPROACH: ICD-10-PCS | Performed by: TRANSPLANT SURGERY

## 2025-08-27 PROCEDURE — 6370000000 HC RX 637 (ALT 250 FOR IP): Performed by: ANESTHESIOLOGY

## 2025-08-27 PROCEDURE — 88342 IMHCHEM/IMCYTCHM 1ST ANTB: CPT

## 2025-08-27 PROCEDURE — 88331 PATH CONSLTJ SURG 1 BLK 1SPC: CPT

## 2025-08-27 PROCEDURE — 2580000003 HC RX 258: Performed by: NURSE ANESTHETIST, CERTIFIED REGISTERED

## 2025-08-27 PROCEDURE — 3600000009 HC SURGERY ROBOT BASE: Performed by: TRANSPLANT SURGERY

## 2025-08-27 PROCEDURE — 2709999900 HC NON-CHARGEABLE SUPPLY: Performed by: TRANSPLANT SURGERY

## 2025-08-27 DEVICE — PORT INFUS 8FR PWR INJ CT FOR VASC ACCS CATH: Type: IMPLANTABLE DEVICE | Site: CHEST | Status: FUNCTIONAL

## 2025-08-27 RX ORDER — SODIUM CHLORIDE 9 MG/ML
INJECTION, SOLUTION INTRAVENOUS
Status: DISCONTINUED | OUTPATIENT
Start: 2025-08-27 | End: 2025-08-27 | Stop reason: SDUPTHER

## 2025-08-27 RX ORDER — DIPHENHYDRAMINE HYDROCHLORIDE 50 MG/ML
INJECTION, SOLUTION INTRAMUSCULAR; INTRAVENOUS
Status: DISCONTINUED | OUTPATIENT
Start: 2025-08-27 | End: 2025-08-27 | Stop reason: SDUPTHER

## 2025-08-27 RX ORDER — FENTANYL CITRATE 50 UG/ML
INJECTION, SOLUTION INTRAMUSCULAR; INTRAVENOUS
Status: DISCONTINUED | OUTPATIENT
Start: 2025-08-27 | End: 2025-08-27 | Stop reason: SDUPTHER

## 2025-08-27 RX ORDER — SODIUM CHLORIDE 9 MG/ML
INJECTION, SOLUTION INTRAVENOUS PRN
Status: DISCONTINUED | OUTPATIENT
Start: 2025-08-27 | End: 2025-08-27 | Stop reason: HOSPADM

## 2025-08-27 RX ORDER — INDOCYANINE GREEN AND WATER 25 MG
5 KIT INJECTION
Status: COMPLETED | OUTPATIENT
Start: 2025-08-27 | End: 2025-08-27

## 2025-08-27 RX ORDER — BUPIVACAINE HYDROCHLORIDE 5 MG/ML
INJECTION, SOLUTION EPIDURAL; INTRACAUDAL; PERINEURAL PRN
Status: DISCONTINUED | OUTPATIENT
Start: 2025-08-27 | End: 2025-08-27 | Stop reason: ALTCHOICE

## 2025-08-27 RX ORDER — PROPOFOL 10 MG/ML
INJECTION, EMULSION INTRAVENOUS
Status: DISCONTINUED | OUTPATIENT
Start: 2025-08-27 | End: 2025-08-27 | Stop reason: SDUPTHER

## 2025-08-27 RX ORDER — ONDANSETRON 4 MG/1
4 TABLET, ORALLY DISINTEGRATING ORAL 3 TIMES DAILY PRN
Qty: 21 TABLET | Refills: 0 | Status: SHIPPED | OUTPATIENT
Start: 2025-08-27

## 2025-08-27 RX ORDER — ONDANSETRON 2 MG/ML
INJECTION INTRAMUSCULAR; INTRAVENOUS
Status: DISCONTINUED | OUTPATIENT
Start: 2025-08-27 | End: 2025-08-27 | Stop reason: SDUPTHER

## 2025-08-27 RX ORDER — MIDAZOLAM HYDROCHLORIDE 1 MG/ML
INJECTION, SOLUTION INTRAMUSCULAR; INTRAVENOUS
Status: DISCONTINUED | OUTPATIENT
Start: 2025-08-27 | End: 2025-08-27 | Stop reason: SDUPTHER

## 2025-08-27 RX ORDER — DIPHENHYDRAMINE HYDROCHLORIDE 50 MG/ML
12.5 INJECTION, SOLUTION INTRAMUSCULAR; INTRAVENOUS
Status: DISCONTINUED | OUTPATIENT
Start: 2025-08-27 | End: 2025-08-27 | Stop reason: HOSPADM

## 2025-08-27 RX ORDER — HEPARIN 100 UNIT/ML
SYRINGE INTRAVENOUS PRN
Status: DISCONTINUED | OUTPATIENT
Start: 2025-08-27 | End: 2025-08-27 | Stop reason: ALTCHOICE

## 2025-08-27 RX ORDER — SODIUM CHLORIDE 0.9 % (FLUSH) 0.9 %
5-40 SYRINGE (ML) INJECTION EVERY 12 HOURS SCHEDULED
Status: DISCONTINUED | OUTPATIENT
Start: 2025-08-27 | End: 2025-08-27 | Stop reason: HOSPADM

## 2025-08-27 RX ORDER — PHENYLEPHRINE HCL IN 0.9% NACL 1 MG/10 ML
SYRINGE (ML) INTRAVENOUS
Status: DISCONTINUED | OUTPATIENT
Start: 2025-08-27 | End: 2025-08-27 | Stop reason: SDUPTHER

## 2025-08-27 RX ORDER — ONDANSETRON 2 MG/ML
4 INJECTION INTRAMUSCULAR; INTRAVENOUS
Status: DISCONTINUED | OUTPATIENT
Start: 2025-08-27 | End: 2025-08-27 | Stop reason: HOSPADM

## 2025-08-27 RX ORDER — SODIUM CHLORIDE 0.9 % (FLUSH) 0.9 %
5-40 SYRINGE (ML) INJECTION PRN
Status: DISCONTINUED | OUTPATIENT
Start: 2025-08-27 | End: 2025-08-27 | Stop reason: HOSPADM

## 2025-08-27 RX ORDER — OXYCODONE AND ACETAMINOPHEN 5; 325 MG/1; MG/1
1 TABLET ORAL
Refills: 0 | Status: COMPLETED | OUTPATIENT
Start: 2025-08-27 | End: 2025-08-27

## 2025-08-27 RX ORDER — DIPHENHYDRAMINE HYDROCHLORIDE 50 MG/ML
12.5 INJECTION, SOLUTION INTRAMUSCULAR; INTRAVENOUS ONCE
Status: DISCONTINUED | OUTPATIENT
Start: 2025-08-27 | End: 2025-08-27 | Stop reason: HOSPADM

## 2025-08-27 RX ORDER — HYDRALAZINE HYDROCHLORIDE 20 MG/ML
10 INJECTION INTRAMUSCULAR; INTRAVENOUS
Status: DISCONTINUED | OUTPATIENT
Start: 2025-08-27 | End: 2025-08-27 | Stop reason: HOSPADM

## 2025-08-27 RX ORDER — HYDROMORPHONE HYDROCHLORIDE 1 MG/ML
0.5 INJECTION, SOLUTION INTRAMUSCULAR; INTRAVENOUS; SUBCUTANEOUS EVERY 5 MIN PRN
Status: DISCONTINUED | OUTPATIENT
Start: 2025-08-27 | End: 2025-08-27 | Stop reason: HOSPADM

## 2025-08-27 RX ORDER — DROPERIDOL 2.5 MG/ML
0.62 INJECTION, SOLUTION INTRAMUSCULAR; INTRAVENOUS
Status: DISCONTINUED | OUTPATIENT
Start: 2025-08-27 | End: 2025-08-27 | Stop reason: HOSPADM

## 2025-08-27 RX ORDER — NEOSTIGMINE METHYLSULFATE 5 MG/5 ML
SYRINGE (ML) INTRAVENOUS
Status: DISCONTINUED | OUTPATIENT
Start: 2025-08-27 | End: 2025-08-27 | Stop reason: SDUPTHER

## 2025-08-27 RX ORDER — OXYCODONE AND ACETAMINOPHEN 5; 325 MG/1; MG/1
1 TABLET ORAL EVERY 6 HOURS PRN
Qty: 20 TABLET | Refills: 0 | Status: SHIPPED | OUTPATIENT
Start: 2025-08-27 | End: 2025-09-01

## 2025-08-27 RX ORDER — IPRATROPIUM BROMIDE AND ALBUTEROL SULFATE 2.5; .5 MG/3ML; MG/3ML
1 SOLUTION RESPIRATORY (INHALATION)
Status: DISCONTINUED | OUTPATIENT
Start: 2025-08-27 | End: 2025-08-27 | Stop reason: HOSPADM

## 2025-08-27 RX ORDER — MEPERIDINE HYDROCHLORIDE 25 MG/ML
12.5 INJECTION INTRAMUSCULAR; INTRAVENOUS; SUBCUTANEOUS
Status: DISCONTINUED | OUTPATIENT
Start: 2025-08-27 | End: 2025-08-27 | Stop reason: HOSPADM

## 2025-08-27 RX ORDER — GLYCOPYRROLATE 1 MG/5 ML
SYRINGE (ML) INTRAVENOUS
Status: DISCONTINUED | OUTPATIENT
Start: 2025-08-27 | End: 2025-08-27 | Stop reason: SDUPTHER

## 2025-08-27 RX ORDER — LIDOCAINE HYDROCHLORIDE 20 MG/ML
INJECTION, SOLUTION INTRAVENOUS
Status: DISCONTINUED | OUTPATIENT
Start: 2025-08-27 | End: 2025-08-27 | Stop reason: SDUPTHER

## 2025-08-27 RX ORDER — HYDROMORPHONE HYDROCHLORIDE 1 MG/ML
0.25 INJECTION, SOLUTION INTRAMUSCULAR; INTRAVENOUS; SUBCUTANEOUS EVERY 5 MIN PRN
Status: DISCONTINUED | OUTPATIENT
Start: 2025-08-27 | End: 2025-08-27 | Stop reason: HOSPADM

## 2025-08-27 RX ORDER — SENNOSIDES 8.6 MG/1
1 TABLET ORAL 2 TIMES DAILY
Qty: 60 TABLET | Refills: 11 | Status: SHIPPED | OUTPATIENT
Start: 2025-08-27 | End: 2026-08-27

## 2025-08-27 RX ORDER — DEXAMETHASONE SODIUM PHOSPHATE 10 MG/ML
INJECTION, SOLUTION INTRA-ARTICULAR; INTRALESIONAL; INTRAMUSCULAR; INTRAVENOUS; SOFT TISSUE
Status: DISCONTINUED | OUTPATIENT
Start: 2025-08-27 | End: 2025-08-27 | Stop reason: SDUPTHER

## 2025-08-27 RX ORDER — VECURONIUM BROMIDE 1 MG/ML
INJECTION, POWDER, LYOPHILIZED, FOR SOLUTION INTRAVENOUS
Status: DISCONTINUED | OUTPATIENT
Start: 2025-08-27 | End: 2025-08-27 | Stop reason: SDUPTHER

## 2025-08-27 RX ORDER — LABETALOL HYDROCHLORIDE 5 MG/ML
10 INJECTION, SOLUTION INTRAVENOUS
Status: DISCONTINUED | OUTPATIENT
Start: 2025-08-27 | End: 2025-08-27 | Stop reason: HOSPADM

## 2025-08-27 RX ADMIN — OXYCODONE HYDROCHLORIDE AND ACETAMINOPHEN 1 TABLET: 5; 325 TABLET ORAL at 12:28

## 2025-08-27 RX ADMIN — FENTANYL CITRATE 50 MCG: 50 INJECTION, SOLUTION INTRAMUSCULAR; INTRAVENOUS at 10:09

## 2025-08-27 RX ADMIN — SODIUM CHLORIDE: 9 INJECTION, SOLUTION INTRAVENOUS at 07:46

## 2025-08-27 RX ADMIN — DEXAMETHASONE SODIUM PHOSPHATE 10 MG: 10 INJECTION INTRAMUSCULAR; INTRAVENOUS at 07:34

## 2025-08-27 RX ADMIN — FENTANYL CITRATE 50 MCG: 50 INJECTION, SOLUTION INTRAMUSCULAR; INTRAVENOUS at 09:02

## 2025-08-27 RX ADMIN — LIDOCAINE HYDROCHLORIDE 100 MG: 20 INJECTION, SOLUTION INTRAVENOUS at 07:34

## 2025-08-27 RX ADMIN — SODIUM CHLORIDE: 9 INJECTION, SOLUTION INTRAVENOUS at 06:55

## 2025-08-27 RX ADMIN — ONDANSETRON HYDROCHLORIDE 4 MG: 2 SOLUTION INTRAMUSCULAR; INTRAVENOUS at 10:17

## 2025-08-27 RX ADMIN — PIPERACILLIN AND TAZOBACTAM 3375 MG: 3; .375 INJECTION, POWDER, LYOPHILIZED, FOR SOLUTION INTRAVENOUS; PARENTERAL at 09:39

## 2025-08-27 RX ADMIN — PIPERACILLIN AND TAZOBACTAM 3375 MG: 3; .375 INJECTION, POWDER, LYOPHILIZED, FOR SOLUTION INTRAVENOUS; PARENTERAL at 07:43

## 2025-08-27 RX ADMIN — PROPOFOL 150 MG: 10 INJECTION, EMULSION INTRAVENOUS at 07:34

## 2025-08-27 RX ADMIN — Medication 100 MCG: at 08:07

## 2025-08-27 RX ADMIN — VECURONIUM BROMIDE 5 MG: 1 INJECTION, POWDER, LYOPHILIZED, FOR SOLUTION INTRAVENOUS at 08:52

## 2025-08-27 RX ADMIN — Medication 0.6 MG: at 10:54

## 2025-08-27 RX ADMIN — Medication 0.2 MG: at 08:08

## 2025-08-27 RX ADMIN — INDOCYANINE GREEN AND WATER 5 MG: KIT at 06:20

## 2025-08-27 RX ADMIN — SODIUM CHLORIDE: 9 INJECTION, SOLUTION INTRAVENOUS at 06:19

## 2025-08-27 RX ADMIN — DIPHENHYDRAMINE HYDROCHLORIDE 12.5 MG: 50 INJECTION, SOLUTION INTRAMUSCULAR; INTRAVENOUS at 07:34

## 2025-08-27 RX ADMIN — NEOSTIGMINE METHYLSULFATE 3 MG: 1 INJECTION INTRAVENOUS at 10:54

## 2025-08-27 RX ADMIN — FENTANYL CITRATE 100 MCG: 50 INJECTION, SOLUTION INTRAMUSCULAR; INTRAVENOUS at 07:34

## 2025-08-27 RX ADMIN — VECURONIUM BROMIDE 10 MG: 1 INJECTION, POWDER, LYOPHILIZED, FOR SOLUTION INTRAVENOUS at 07:34

## 2025-08-27 RX ADMIN — MIDAZOLAM 2 MG: 1 INJECTION INTRAMUSCULAR; INTRAVENOUS at 07:31

## 2025-08-27 ASSESSMENT — PAIN SCALES - GENERAL
PAINLEVEL_OUTOF10: 0
PAINLEVEL_OUTOF10: 0
PAINLEVEL_OUTOF10: 5
PAINLEVEL_OUTOF10: 0

## 2025-08-27 ASSESSMENT — PAIN DESCRIPTION - DESCRIPTORS: DESCRIPTORS: ACHING;SORE;DISCOMFORT

## 2025-08-27 ASSESSMENT — ENCOUNTER SYMPTOMS
CHEST TIGHTNESS: 0
ABDOMINAL PAIN: 0
SHORTNESS OF BREATH: 0
EYES NEGATIVE: 1
SHORTNESS OF BREATH: 0

## 2025-08-27 ASSESSMENT — PAIN DESCRIPTION - ORIENTATION: ORIENTATION: RIGHT;MID;INNER

## 2025-08-27 ASSESSMENT — PAIN DESCRIPTION - LOCATION: LOCATION: ABDOMEN;CHEST

## 2025-08-27 ASSESSMENT — PAIN - FUNCTIONAL ASSESSMENT
PAIN_FUNCTIONAL_ASSESSMENT: 0-10
PAIN_FUNCTIONAL_ASSESSMENT: 0-10

## 2025-08-31 LAB
ABO/RH: NORMAL
ANTIBODY SCREEN: NEGATIVE
ARM BAND NUMBER: NORMAL
BLOOD BANK DISPENSE STATUS: NORMAL
BLOOD BANK SAMPLE EXPIRATION: NORMAL
BPU ID: NORMAL
COMPONENT: NORMAL
CROSSMATCH RESULT: NORMAL
TRANSFUSION STATUS: NORMAL
UNIT DIVISION: 0

## 2025-09-03 LAB — SURGICAL PATHOLOGY REPORT: NORMAL

## (undated) DEVICE — DRAPE ARM W21XH19XL10.5IN DA VINCI XI INTUITIVE SURGICAL

## (undated) DEVICE — Device

## (undated) DEVICE — TROCAR ENDOSCP L100MM OD15MM THRD OPT ACCS SYS KII

## (undated) DEVICE — GLOVE SURG SZ 6 THK91MIL LTX FREE SYN POLYISOPRENE ANTI

## (undated) DEVICE — DRAPE,EXTREMITY,89X128,STERILE: Brand: MEDLINE

## (undated) DEVICE — JELLY,LUBE,STERILE,FLIP TOP,TUBE,2-OZ: Brand: MEDLINE

## (undated) DEVICE — ADHESIVE SKIN CLSR 0.7ML TOP DERMBND ADV

## (undated) DEVICE — MEDIA CONTRAST ISOVUE 300 100ML

## (undated) DEVICE — STAPLER 60: Brand: SUREFORM

## (undated) DEVICE — SYSTEM INJ BILI RAP REFIL CONT

## (undated) DEVICE — BANDAGE COMPR W6INXL12FT SMOOTH FOR LIMB EXSANG ESMARCH

## (undated) DEVICE — MEDICINE CUP, GRADUATED, STER: Brand: MEDLINE

## (undated) DEVICE — TIP-UP FENESTRATED GRASPER: Brand: ENDOWRIST

## (undated) DEVICE — REDUCER: Brand: ENDOWRIST

## (undated) DEVICE — RETRIEVAL BALLOON CATHETER: Brand: EXTRACTOR™ PRO RX

## (undated) DEVICE — TUBING SUCT 12FR MAL ALUM SHFT FN CAP VENT UNIV CONN W/ OBT

## (undated) DEVICE — GLOVE ORANGE PI 7 1/2   MSG9075

## (undated) DEVICE — LOOP VES W25MM THK1MM MAXI RED SIL FLD REPELLENT 100 PER

## (undated) DEVICE — TROCAR LAP L100MM DIA12MM Z THRD 1ST ENTRY SYS KII FIOS

## (undated) DEVICE — SEAL UNIVERSAL 5-12MM

## (undated) DEVICE — DRIVER NDL L L31.5CM JAW L1CM 30DEG ENDOWRIST DA VINCI SI

## (undated) DEVICE — BOOT SUT STD YEL IN BLU SMOOTH RADPQ

## (undated) DEVICE — NEEDLE CLOSURE OMNICLOSE

## (undated) DEVICE — STAPLER INT L28CM DIA29MM CLS STPL H10-2.5MM OPN LEG L5.5MM

## (undated) DEVICE — CANNULA NSL CO2 SAMPLING AD ORAL W/ O2 DEL CAPNOFLEX

## (undated) DEVICE — FILTER SMK EVAC LAP 10 FT CONT SUCTION PLUMEPORT ACTIV

## (undated) DEVICE — GUIDEWIRE VASC L450CM TIP L5CM 025FR STR RND TIP TUNGSTEN FI

## (undated) DEVICE — APPLIER CLP L DIA8MM HEM-O-LOK ENDOWRIST

## (undated) DEVICE — GUIDEWIRE VASC STR 3 CM 0.035 INX150 CM STD NIT ZIPWIRE

## (undated) DEVICE — NEEDLE INSUF 13GA L120MM

## (undated) DEVICE — DOUBLE BASIN SET: Brand: MEDLINE INDUSTRIES, INC.

## (undated) DEVICE — BLADELESS OBTURATOR: Brand: WECK VISTA

## (undated) DEVICE — GAUZE,SPONGE,4"X4",8PLY,STRL,LF,10/TRAY: Brand: MEDLINE

## (undated) DEVICE — SINGLE USE DISTAL COVER MAJ-2315: Brand: SINGLE USE DISTAL COVER

## (undated) DEVICE — STANDARD HYPODERMIC NEEDLE,ALUMINUM HUB: Brand: MONOJECT

## (undated) DEVICE — GOWN SURG 2XL L49IN BLU FABRICREINFORCED SET IN SL HK LOOP

## (undated) DEVICE — SPLINT OCL 2 PLSTR SPLNTING SYS 15 LAYR 5INX20FT

## (undated) DEVICE — GUIDEWIRE URO L150CM DIA0.035IN TAPR 3CM NIT HYDRPHLC ANG

## (undated) DEVICE — GOWN,SIRUS,FABRNF,XL,20/CS: Brand: MEDLINE

## (undated) DEVICE — INTENDED FOR TISSUE SEPARATION, AND OTHER PROCEDURES THAT REQUIRE A SHARP SURGICAL BLADE TO PUNCTURE OR CUT.: Brand: BARD-PARKER ® STAINLESS STEEL BLADES

## (undated) DEVICE — CATHETER URET 5FR L70CM OPN END SGL LUMN INJ HUB FLEXIMA

## (undated) DEVICE — CLIP INT L POLYMER LOK LIG HEM O LOK (6EA/PK): Type: IMPLANTABLE DEVICE | Site: ABDOMEN | Status: NON-FUNCTIONAL

## (undated) DEVICE — DRESSING ALG W2XL5IN ANTIMIC WND JUMPSTART

## (undated) DEVICE — SOLUTION IV IRRIG WATER 1000ML POUR BRL 2F7114

## (undated) DEVICE — ADAPTER AIR/WATER CHANNEL CLEANING OLYMPUS COMPATIBLE NS

## (undated) DEVICE — FORMALIN  10%NBF 60ML PREFLL CONT

## (undated) DEVICE — K WIRE FIX L6IN DIA0.045IN 1600645] MICROAIRE SURGICAL INSTRUMENTS INC]
Type: IMPLANTABLE DEVICE | Site: FOOT | Status: NON-FUNCTIONAL
Removed: 2019-01-11

## (undated) DEVICE — KIT,ANTI FOG,W/SPONGE & FLUID,SOFT PACK: Brand: MEDLINE

## (undated) DEVICE — LEGGINGS, PAIR, 31X48, STERILE: Brand: MEDLINE

## (undated) DEVICE — SOLUTION IV IRRIG POUR BRL 0.9% SODIUM CHL 2F7124

## (undated) DEVICE — SYSTEM BX CAP BILI RAP EXCHG CAP LOK DEV COMPATIBLE W/ OLY

## (undated) DEVICE — CHLORAPREP 26ML ORANGE

## (undated) DEVICE — ACCESS AND DELIVERY CATHETER: Brand: SPYSCOPE™ DS II

## (undated) DEVICE — BITEBLOCK 54FR W/ DENT RIM BLOX

## (undated) DEVICE — SOLUTION IRRIG 3000ML STRL H2O USP UROMATIC PLAS CONT

## (undated) DEVICE — GOWN,SIRUS,FABRNF,L,20/CS: Brand: MEDLINE

## (undated) DEVICE — SYRINGE MED 50ML LUERLOCK TIP

## (undated) DEVICE — SEAL

## (undated) DEVICE — SPONGE GZ W4XL4IN 8 PLY 100% COTTON

## (undated) DEVICE — SEALER VES DIA8MM EXT ENDOWRIST

## (undated) DEVICE — STAPLER SKIN H3.9MM WIRE DIA0.58MM CRWN 6.9MM 35 STPL FIX

## (undated) DEVICE — SET INSUF TUBE HEAT ISO CONN DISP

## (undated) DEVICE — PADDING,UNDERCAST,COTTON, 4"X4YD STERILE: Brand: MEDLINE

## (undated) DEVICE — ELECTRODE PT RET AD L9FT HI MOIST COND ADH HYDRGEL CORDED

## (undated) DEVICE — ARM DRAPE

## (undated) DEVICE — AIR/WATER CLEANING ADAPTER FOR OLYMPUS® GI ENDOSCOPE: Brand: BULLDOG®

## (undated) DEVICE — SOLUTION SURG PREP ANTIMICROBIAL 4 OZ SKIN WND EXIDINE

## (undated) DEVICE — SUCTION IRRIGATOR: Brand: ENDOWRIST

## (undated) DEVICE — TOWEL,OR,DSP,ST,BLUE,STD,6/PK,12PK/CS: Brand: MEDLINE

## (undated) DEVICE — SYRINGE MED 10ML LUERLOCK TIP W/O SFTY DISP

## (undated) DEVICE — GUIDEWIRE ENDOSCP L150CM DIA0.035IN TIP L15CM BENT PTFE

## (undated) DEVICE — PAD NONADHESIVE W2XL3IN POLY COT SFT

## (undated) DEVICE — 4-PORT MANIFOLD: Brand: NEPTUNE 2

## (undated) DEVICE — CADIERE FORCEPS: Brand: ENDOWRIST

## (undated) DEVICE — ACCESS PLATFORM FOR MINIMALLY INVASIVE SURGERY.: Brand: GELPORT® LAPAROSCOPIC  SYSTEM

## (undated) DEVICE — BANDAGE,GAUZE,BULKEE II,4.5"X4.1YD,STRL: Brand: MEDLINE

## (undated) DEVICE — SINGLE-USE BIOPSY FORCEPS: Brand: SPYBITE MAX

## (undated) DEVICE — SINGLE-USE SNARE: Brand: CAPTIVATOR™ COLD

## (undated) DEVICE — PUMP SUC IRR TBNG L10FT W/ HNDPC ASSEMB STRYKEFLOW 2

## (undated) DEVICE — TIP COVER ACCESSORY

## (undated) DEVICE — RETRIEVER ENDOSCP L200CM BLLN DIA9-12MM CATH 6-7FR BILI PRO

## (undated) DEVICE — SPHINCTEROTOME ENDOSCP CANN 3.9 FRX260 CM 0.025 IN JAGTOME

## (undated) DEVICE — PACK PROCEDURE SURG GEN CUST

## (undated) DEVICE — BNDG,ELSTC,MATRIX,STRL,3"X5YD,LF,HOOK&LP: Brand: MEDLINE

## (undated) DEVICE — CLIP INT M L POLYMER LOK LIG HEM O LOK: Type: IMPLANTABLE DEVICE | Site: ABDOMEN | Status: NON-FUNCTIONAL

## (undated) DEVICE — CANNULA SEAL

## (undated) DEVICE — CONTROL SYRINGE LUER-LOCK TIP: Brand: MONOJECT

## (undated) DEVICE — LARGE HEM-O-LOK CLIP APPLIER: Brand: ENDOWRIST

## (undated) DEVICE — WARMER SCP 2 ANTIFOG LAP DISP

## (undated) DEVICE — PATIENT RETURN ELECTRODE, SINGLE-USE, CONTACT QUALITY MONITORING, ADULT, WITH 9FT CORD, FOR PATIENTS WEIGING OVER 33LBS. (15KG): Brand: MEGADYNE

## (undated) DEVICE — GUIDEWIRE VASC L260CM TIP L5CM 0.25FR STR RND TIP TUNGSTEN

## (undated) DEVICE — BLADE SAW SAG OSCIL LNG MED 9X31MM

## (undated) DEVICE — SUTURE VCRL SZ 2-0 L27IN ABSRB UD L26MM SH 1/2 CIR J417H

## (undated) DEVICE — TOTAL TRAY, 16FR 10ML SIL FOLEY, URN: Brand: MEDLINE

## (undated) DEVICE — SCREW BNE L22MM DIA3MM CORT FT ANK TI SELF DRL SLD FULL
Type: IMPLANTABLE DEVICE | Site: FOOT | Status: NON-FUNCTIONAL
Removed: 2019-01-11

## (undated) DEVICE — GLOVE SURG SZ 75 CRM LTX FREE POLYISOPRENE POLYMER BEAD ANTI

## (undated) DEVICE — VALVE E AIR H2O SUCT AND BX DISP DEFENDO

## (undated) DEVICE — COLUMN DRAPE

## (undated) DEVICE — INSUFFLATION NEEDLE TO ESTABLISH PNEUMOPERITONEUM.: Brand: INSUFFLATION NEEDLE

## (undated) DEVICE — CONNECTOR CATH URET SIDE PRT FOR FRIC CONN UCA595] GU TEK]

## (undated) DEVICE — GLOVE SURG SZ 7.5 L11.73IN FNGR THK9.8MIL STRW LTX POLYMER

## (undated) DEVICE — DRAPE C ARM W/ POLY STRP W42XL72IN FOR MOB XR

## (undated) DEVICE — STOCKINETTE,DOUBLE PLY,6X48,STERILE: Brand: MEDLINE

## (undated) DEVICE — SOLUTION IRRIG 1000ML H2O PIC PLAS SHATTERPROOF CONTAINER

## (undated) DEVICE — SOLUTION ELECTROCAUTERY 4ML YEL BRN AMPHIPHILIC PHOSLIP

## (undated) DEVICE — DRAPE,LAP,CHOLE,W/TROUGHS,STERILE: Brand: MEDLINE

## (undated) DEVICE — SCREW BNE L18MM DIA3MM CORT FT ANK TI SELF DRL SLD FULL
Type: IMPLANTABLE DEVICE | Site: FOOT | Status: NON-FUNCTIONAL
Removed: 2019-01-11

## (undated) DEVICE — CYSTO PACK: Brand: MEDLINE INDUSTRIES, INC.

## (undated) DEVICE — COVER HNDL LT DISP

## (undated) DEVICE — SNARE VASC L240CM LOOP W10MM SHTH DIA2.4MM RND STIFF CLD

## (undated) DEVICE — BLADE CLIPPER GEN PURP NS

## (undated) DEVICE — FOLEY TRAY 1 LAYR 16 FR 10 CC TEMP SNAPSECURE URO175816TS

## (undated) DEVICE — LOCKING DEVICE AND BIOPSY CAP FOR USE WITH RX BILIARY SYSTEM: Brand: RX LOCKING DEVICE AND BIOPSY CAP

## (undated) DEVICE — CATHETER URETH 12FR BLLN 5CC LTX HYDRGEL 2 W BARDX LUB F

## (undated) DEVICE — SOL IRR SOD CHL 0.9% TITAN XL CNTNR 3000ML

## (undated) DEVICE — TOWEL,OR,DSP,ST,GREEN,DLX,XR,4/PK,20PK/C: Brand: MEDLINE

## (undated) DEVICE — DECANTER FLD L9IN WHT ASEP TRNSF

## (undated) DEVICE — STAPLER 60 RELOAD GREEN: Brand: SUREFORM

## (undated) DEVICE — GARMENT,MEDLINE,DVT,INT,CALF,MED, GEN2: Brand: MEDLINE

## (undated) DEVICE — CLIP INT L POLYMER LOK LIG HEM O LOK

## (undated) DEVICE — COVER ENDOSCP INSTRUMENT DSTL CVR DISP (EA = 20 UNITS)

## (undated) DEVICE — SYRINGE IRRIG 60ML SFT PLIABLE BLB EZ TO GRP 1 HND USE W/

## (undated) DEVICE — SCISSORS LAP W5MMXL35CM EPIX

## (undated) DEVICE — SYRINGE 20ML LL S/C 50

## (undated) DEVICE — SKIN PREP TRAY 4 COMPARTM TRAY: Brand: MEDLINE INDUSTRIES, INC.

## (undated) DEVICE — RESCUE COMBO FORCEPS

## (undated) DEVICE — APPLIER CLP M-L DIA8MM ENDOWRIST

## (undated) DEVICE — DRAPE CARM MINI FOR IMAG SYS INSIGHT FLROSCN

## (undated) DEVICE — SCISSORS SURG DIA8MM MPLR CRV HOT SHR

## (undated) DEVICE — SOLUTION IRRIG 3000ML 0.9% SOD CHL USP UROMATIC PLAS CONT

## (undated) DEVICE — MANIFOLD SUCT 4 PRT 2 CANSTR FLTR DISP NEPTUNE 2

## (undated) DEVICE — 3M™ COBAN™ NL STERILE NON-LATEX SELF-ADHERENT WRAP, 2084S, 4 IN X 5 YD (10 CM X 4,5 M), 18 ROLLS/CASE: Brand: 3M™ COBAN™

## (undated) DEVICE — TOWEL SURG W17XL27IN BLU COT STD PREWASHED STERILE 6 PER PK

## (undated) DEVICE — BRUSH CYTO W2.1MMXL200CM CATH 8FR 0.035IN BILI DUCT MTL TB

## (undated) DEVICE — SYRINGE MED 20ML STD CLR PLAS LUERLOCK TIP N CTRL DISP

## (undated) DEVICE — PRECISION THIN (7.0 X 0.38 X 29.5MM)

## (undated) DEVICE — ANCHOR FIX DISP FOR ANK FRAC SYS BB-TAK

## (undated) DEVICE — DRESSING COMP W4XL4IN N ADH PD W2.5XL2.5IN GZ BORDERED ADH

## (undated) DEVICE — CLEANER LENS C-CLEAR

## (undated) DEVICE — FORCEPS SURG DIA8MM BPLR FEN ENDOWRIST

## (undated) DEVICE — GOWN SURG XL L47IN BLU FABRICREINFORCED SET IN SL HK LOOP

## (undated) DEVICE — FORCEPS SURG DIA8MM PROGRASP ENDOSWITCH

## (undated) DEVICE — PAD PT POS 36 IN SURGYPAD DISP

## (undated) DEVICE — DRAPE EQUIP CLMN ROBOTIC DISP DA VINCI XI

## (undated) DEVICE — VESSEL SEALER EXTEND: Brand: ENDOWRIST

## (undated) DEVICE — DEFENDO AIR WATER SUCTION AND BIOPSY VALVE KIT FOR  OLYMPUS: Brand: DEFENDO AIR/WATER/SUCTION AND BIOPSY VALVE

## (undated) DEVICE — CANNULATING SPHINCTEROTOME: Brand: JAGTOME™ REVOLUTION RX

## (undated) DEVICE — COVER TIP DIA8MM DA VINCI SI XI X ENDOWRIST

## (undated) DEVICE — OBTURATOR ROBOTIC DIA8MM STD OPT BLDELSS

## (undated) DEVICE — GLOVE SURG SZ 65 L12IN FNGR THK79MIL GRN LTX FREE

## (undated) DEVICE — SYSTEM RETRV 5MM PRT UNIV INZII

## (undated) DEVICE — NEEDLE HYPO 25GA L1.5IN BLU POLYPR HUB S STL REG BVL STR